# Patient Record
Sex: MALE | Race: WHITE | NOT HISPANIC OR LATINO | Employment: FULL TIME | URBAN - METROPOLITAN AREA
[De-identification: names, ages, dates, MRNs, and addresses within clinical notes are randomized per-mention and may not be internally consistent; named-entity substitution may affect disease eponyms.]

---

## 2020-08-17 ENCOUNTER — TELEPHONE (OUTPATIENT)
Dept: FAMILY MEDICINE | Facility: CLINIC | Age: 40
End: 2020-08-17

## 2020-08-17 NOTE — TELEPHONE ENCOUNTER
----- Message from Oscar Neff sent at 8/17/2020  3:51 PM CDT -----  Regarding: Labs  Contact: Patient  Type: Patient Call Back    Who called:Patient    What is the request in detail: Patient needs annual full panel labs. Please advise,    Can the clinic reply by [facility]? No    Would the patient rather a call back or a response via [facility]? Call    Best call back number: [phone] (home)     Additional Information:n/a

## 2020-08-18 ENCOUNTER — TELEPHONE (OUTPATIENT)
Dept: FAMILY MEDICINE | Facility: CLINIC | Age: 40
End: 2020-08-18

## 2020-08-18 DIAGNOSIS — Z00.00 ANNUAL PHYSICAL EXAM: Primary | ICD-10-CM

## 2020-08-18 NOTE — TELEPHONE ENCOUNTER
What medical conditions does patient have?  I would like to know so that I can order what I need to order  Also what day is patient wanting labs to be done?  Please schedule prior to office visit

## 2020-08-18 NOTE — TELEPHONE ENCOUNTER
Called patient and no answer. A message was left for them to call us back at the clinic regarding previous message from doctor.

## 2020-08-19 ENCOUNTER — LAB VISIT (OUTPATIENT)
Dept: LAB | Facility: HOSPITAL | Age: 40
End: 2020-08-19
Attending: FAMILY MEDICINE
Payer: COMMERCIAL

## 2020-08-19 DIAGNOSIS — Z00.00 ANNUAL PHYSICAL EXAM: ICD-10-CM

## 2020-08-19 LAB
ALBUMIN SERPL BCP-MCNC: 4.1 G/DL (ref 3.5–5.2)
ALP SERPL-CCNC: 75 U/L (ref 55–135)
ALT SERPL W/O P-5'-P-CCNC: 24 U/L (ref 10–44)
ANION GAP SERPL CALC-SCNC: 10 MMOL/L (ref 8–16)
AST SERPL-CCNC: 25 U/L (ref 10–40)
BASOPHILS # BLD AUTO: 0.02 K/UL (ref 0–0.2)
BASOPHILS NFR BLD: 0.4 % (ref 0–1.9)
BILIRUB SERPL-MCNC: 1.4 MG/DL (ref 0.1–1)
BUN SERPL-MCNC: 12 MG/DL (ref 6–20)
CALCIUM SERPL-MCNC: 9 MG/DL (ref 8.7–10.5)
CHLORIDE SERPL-SCNC: 106 MMOL/L (ref 95–110)
CHOLEST SERPL-MCNC: 165 MG/DL (ref 120–199)
CHOLEST/HDLC SERPL: 3.6 {RATIO} (ref 2–5)
CO2 SERPL-SCNC: 23 MMOL/L (ref 23–29)
CREAT SERPL-MCNC: 0.7 MG/DL (ref 0.5–1.4)
DIFFERENTIAL METHOD: ABNORMAL
EOSINOPHIL # BLD AUTO: 0.1 K/UL (ref 0–0.5)
EOSINOPHIL NFR BLD: 2.5 % (ref 0–8)
ERYTHROCYTE [DISTWIDTH] IN BLOOD BY AUTOMATED COUNT: 12.4 % (ref 11.5–14.5)
EST. GFR  (AFRICAN AMERICAN): >60 ML/MIN/1.73 M^2
EST. GFR  (NON AFRICAN AMERICAN): >60 ML/MIN/1.73 M^2
GLUCOSE SERPL-MCNC: 80 MG/DL (ref 70–110)
HCT VFR BLD AUTO: 46.5 % (ref 40–54)
HDLC SERPL-MCNC: 46 MG/DL (ref 40–75)
HDLC SERPL: 27.9 % (ref 20–50)
HGB BLD-MCNC: 15.6 G/DL (ref 14–18)
IMM GRANULOCYTES # BLD AUTO: 0.03 K/UL (ref 0–0.04)
IMM GRANULOCYTES NFR BLD AUTO: 0.6 % (ref 0–0.5)
LDLC SERPL CALC-MCNC: 100.4 MG/DL (ref 63–159)
LYMPHOCYTES # BLD AUTO: 1.7 K/UL (ref 1–4.8)
LYMPHOCYTES NFR BLD: 32.2 % (ref 18–48)
MCH RBC QN AUTO: 31 PG (ref 27–31)
MCHC RBC AUTO-ENTMCNC: 33.5 G/DL (ref 32–36)
MCV RBC AUTO: 92 FL (ref 82–98)
MONOCYTES # BLD AUTO: 0.3 K/UL (ref 0.3–1)
MONOCYTES NFR BLD: 6.1 % (ref 4–15)
NEUTROPHILS # BLD AUTO: 3.1 K/UL (ref 1.8–7.7)
NEUTROPHILS NFR BLD: 58.2 % (ref 38–73)
NONHDLC SERPL-MCNC: 119 MG/DL
NRBC BLD-RTO: 0 /100 WBC
PLATELET # BLD AUTO: 237 K/UL (ref 150–350)
PMV BLD AUTO: 11.2 FL (ref 9.2–12.9)
POTASSIUM SERPL-SCNC: 4 MMOL/L (ref 3.5–5.1)
PROT SERPL-MCNC: 7 G/DL (ref 6–8.4)
RBC # BLD AUTO: 5.03 M/UL (ref 4.6–6.2)
SODIUM SERPL-SCNC: 139 MMOL/L (ref 136–145)
TRIGL SERPL-MCNC: 93 MG/DL (ref 30–150)
TSH SERPL DL<=0.005 MIU/L-ACNC: 1.24 UIU/ML (ref 0.4–4)
WBC # BLD AUTO: 5.28 K/UL (ref 3.9–12.7)

## 2020-08-19 PROCEDURE — 85025 COMPLETE CBC W/AUTO DIFF WBC: CPT

## 2020-08-19 PROCEDURE — 80061 LIPID PANEL: CPT

## 2020-08-19 PROCEDURE — 36415 COLL VENOUS BLD VENIPUNCTURE: CPT | Mod: PO

## 2020-08-19 PROCEDURE — 84443 ASSAY THYROID STIM HORMONE: CPT

## 2020-08-19 PROCEDURE — 80053 COMPREHEN METABOLIC PANEL: CPT

## 2020-08-21 ENCOUNTER — OFFICE VISIT (OUTPATIENT)
Dept: FAMILY MEDICINE | Facility: CLINIC | Age: 40
End: 2020-08-21
Payer: COMMERCIAL

## 2020-08-21 VITALS
OXYGEN SATURATION: 97 % | HEIGHT: 67 IN | TEMPERATURE: 98 F | BODY MASS INDEX: 44.47 KG/M2 | WEIGHT: 283.31 LBS | SYSTOLIC BLOOD PRESSURE: 124 MMHG | DIASTOLIC BLOOD PRESSURE: 86 MMHG | HEART RATE: 83 BPM

## 2020-08-21 DIAGNOSIS — E66.01 MORBID OBESITY WITH BMI OF 40.0-44.9, ADULT: ICD-10-CM

## 2020-08-21 DIAGNOSIS — Z00.00 ANNUAL PHYSICAL EXAM: Primary | ICD-10-CM

## 2020-08-21 DIAGNOSIS — R06.02 SHORT OF BREATH ON EXERTION: ICD-10-CM

## 2020-08-21 PROCEDURE — 3008F PR BODY MASS INDEX (BMI) DOCUMENTED: ICD-10-PCS | Mod: CPTII,S$GLB,, | Performed by: FAMILY MEDICINE

## 2020-08-21 PROCEDURE — 99999 PR PBB SHADOW E&M-EST. PATIENT-LVL III: CPT | Mod: PBBFAC,,, | Performed by: FAMILY MEDICINE

## 2020-08-21 PROCEDURE — 99386 PR PREVENTIVE VISIT,NEW,40-64: ICD-10-PCS | Mod: S$GLB,,, | Performed by: FAMILY MEDICINE

## 2020-08-21 PROCEDURE — 3008F BODY MASS INDEX DOCD: CPT | Mod: CPTII,S$GLB,, | Performed by: FAMILY MEDICINE

## 2020-08-21 PROCEDURE — 99386 PREV VISIT NEW AGE 40-64: CPT | Mod: S$GLB,,, | Performed by: FAMILY MEDICINE

## 2020-08-21 PROCEDURE — 99999 PR PBB SHADOW E&M-EST. PATIENT-LVL III: ICD-10-PCS | Mod: PBBFAC,,, | Performed by: FAMILY MEDICINE

## 2020-08-21 RX ORDER — PREDNISONE 20 MG/1
20 TABLET ORAL 2 TIMES DAILY
COMMUNITY
Start: 2020-07-10 | End: 2020-08-21

## 2020-08-21 NOTE — PROGRESS NOTES
Routine Office Visit    Patient Name: Danilo Krause    : 1980  MRN: [id number]    Subjective:  Danilo is a 40 y.o. male who presents today for     1. Annual exam / establish care / new to me  2. covid infection - Patient and family had covid infection - Patient tested positive a few weeks ago - Patient states his symptoms have improved. He works outside and states that he has noticed that he continues to have shortness of breath on exertion. He also notices that he continues to be more fatigue and is not able to recover as fast as he was able to prior to having covid. He expresses concern because he does have family history of heart disease (his father has afib). He states symptoms are slowly improving on a daily basis.       Review of Systems   Constitutional: Negative for chills and fever.   HENT: Negative for congestion.    Eyes: Negative for blurred vision.   Respiratory: Positive for shortness of breath. Negative for cough.    Cardiovascular: Negative for chest pain.   Gastrointestinal: Negative for abdominal pain, constipation, diarrhea, heartburn, nausea and vomiting.   Genitourinary: Negative for dysuria.   Musculoskeletal: Negative for myalgias.   Skin: Negative for itching and rash.   Neurological: Negative for dizziness and headaches.   Psychiatric/Behavioral: Negative for depression.       Active Problem List  Patient Active Problem List   Diagnosis    Morbid obesity with BMI of 40.0-44.9, adult       Past Surgical History  History reviewed. No pertinent surgical history.    Family History  Family History   Problem Relation Age of Onset    Hypertension Father        Social History  Social History     Socioeconomic History    Marital status:      Spouse name: Not on file    Number of children: Not on file    Years of education: Not on file    Highest education level: Not on file   Occupational History    Not on file   Social Needs    Financial resource strain: Not on file    Food  "insecurity     Worry: Not on file     Inability: Not on file    Transportation needs     Medical: Not on file     Non-medical: Not on file   Tobacco Use    Smoking status: Never Smoker    Smokeless tobacco: Never Used   Substance and Sexual Activity    Alcohol use: Yes    Drug use: Never    Sexual activity: Yes     Partners: Female   Lifestyle    Physical activity     Days per week: Not on file     Minutes per session: Not on file    Stress: Only a little   Relationships    Social connections     Talks on phone: Not on file     Gets together: Not on file     Attends Zoroastrian service: Not on file     Active member of club or organization: Not on file     Attends meetings of clubs or organizations: Not on file     Relationship status: Not on file   Other Topics Concern    Not on file   Social History Narrative    Not on file       Medications and Allergies  Reviewed and updated.   No current outpatient medications on file.     No current facility-administered medications for this visit.        Physical Exam  /86 (BP Location: Left arm, Patient Position: Sitting, BP Method: Large (Manual))   Pulse 83   Temp 98 °F (36.7 °C) (Temporal)   Ht 5' 7" (1.702 m)   Wt 128.5 kg (283 lb 4.7 oz)   SpO2 97%   BMI 44.37 kg/m²   Physical Exam  Constitutional:       Appearance: He is well-developed.   HENT:      Head: Normocephalic and atraumatic.   Eyes:      Conjunctiva/sclera: Conjunctivae normal.      Pupils: Pupils are equal, round, and reactive to light.   Neck:      Musculoskeletal: Normal range of motion and neck supple.      Thyroid: No thyromegaly.      Vascular: No JVD.   Cardiovascular:      Rate and Rhythm: Normal rate and regular rhythm.      Heart sounds: Normal heart sounds.   Pulmonary:      Effort: Pulmonary effort is normal.      Breath sounds: Normal breath sounds. No wheezing.   Abdominal:      General: Bowel sounds are normal. There is no distension.      Palpations: Abdomen is soft.      " Tenderness: There is no abdominal tenderness. There is no guarding.   Musculoskeletal: Normal range of motion.   Lymphadenopathy:      Cervical: No cervical adenopathy.   Skin:     General: Skin is warm and dry.   Neurological:      Mental Status: He is alert and oriented to person, place, and time.   Psychiatric:         Behavior: Behavior normal.           Assessment/Plan:  Danilo Krause is a 40 y.o. male who presents today for :    Problem List Items Addressed This Visit        Endocrine    Morbid obesity with BMI of 40.0-44.9, adult  The patient is asked to make an attempt to improve diet and exercise patterns to aid in medical management of this problem.        Other Visit Diagnoses     Annual physical exam    -  Primary  Health Maintenance       Date Due Completion Date    Hepatitis C Screening 1980 ---    HIV Screening 01/10/1994 ---    TETANUS VACCINE 01/10/1997 ---    Influenza Vaccine (1) 09/01/2020 ---    Lipid Panel 08/19/2025 8/19/2020        I addressed all major concerns as it related to health maintenance.  All were ordered and scheduled based on the patients wishes.  Any additional health maintenance will be readdressed at the next physical if declined or deferred by the patient.  I reviewed patient's labs with patient       Short of breath on exertion        Relevant Orders    X-Ray Chest PA And Lateral (Completed)  Normal chest x-ray  Consider pulmonary function test  Patient may have residual shortness of breath with covid   Continue to monitor   cxr within normal limits   Physical exam within normal limit   Return if symptoms worsen / progress             Follow up in about 1 year (around 8/21/2021), or if symptoms worsen or fail to improve, for yearly exam.

## 2020-08-22 ENCOUNTER — HOSPITAL ENCOUNTER (OUTPATIENT)
Dept: RADIOLOGY | Facility: HOSPITAL | Age: 40
Discharge: HOME | End: 2020-08-22
Attending: FAMILY MEDICINE
Payer: COMMERCIAL

## 2020-08-22 DIAGNOSIS — R06.02 SHORT OF BREATH ON EXERTION: ICD-10-CM

## 2020-08-22 PROBLEM — E66.01 MORBID OBESITY WITH BMI OF 40.0-44.9, ADULT: Status: ACTIVE | Noted: 2020-08-22

## 2020-08-22 PROCEDURE — 71046 X-RAY EXAM CHEST 2 VIEWS: CPT | Mod: TC,FY,PO

## 2020-08-22 PROCEDURE — 71046 X-RAY EXAM CHEST 2 VIEWS: CPT | Mod: 26,,, | Performed by: RADIOLOGY

## 2020-08-22 PROCEDURE — 71046 XR CHEST PA AND LATERAL: ICD-10-PCS | Mod: 26,,, | Performed by: RADIOLOGY

## 2020-08-25 ENCOUNTER — TELEPHONE (OUTPATIENT)
Dept: FAMILY MEDICINE | Facility: CLINIC | Age: 40
End: 2020-08-25

## 2020-08-25 NOTE — TELEPHONE ENCOUNTER
----- Message from Kandace Diane sent at 8/25/2020  8:54 AM CDT -----  Regarding: pt  Type:  Test Results    Who Called: pt    Name of Test (Lab/Mammo/Etc): xray    Date of Test:     Ordering Provider: rom    Where the test was performed:     Would the patient rather a call back or a response via [facility]?     Best Call Back Number: [phone] (home)       Additional Information:      For Clinical Team:Has the provider reviewed the results?

## 2020-08-28 DIAGNOSIS — Z11.59 NEED FOR HEPATITIS C SCREENING TEST: ICD-10-CM

## 2021-12-08 DIAGNOSIS — Z11.59 NEED FOR HEPATITIS C SCREENING TEST: ICD-10-CM

## 2022-05-31 ENCOUNTER — PATIENT MESSAGE (OUTPATIENT)
Dept: ADMINISTRATIVE | Facility: HOSPITAL | Age: 42
End: 2022-05-31
Payer: COMMERCIAL

## 2023-06-01 ENCOUNTER — PATIENT OUTREACH (OUTPATIENT)
Dept: ADMINISTRATIVE | Facility: HOSPITAL | Age: 43
End: 2023-06-01
Payer: COMMERCIAL

## 2023-11-15 ENCOUNTER — TELEPHONE (OUTPATIENT)
Dept: FAMILY MEDICINE | Facility: CLINIC | Age: 43
End: 2023-11-15
Payer: COMMERCIAL

## 2023-11-15 NOTE — TELEPHONE ENCOUNTER
----- Message from Jamar Monte sent at 11/15/2023  9:31 AM CST -----  Type: Patient Call Back    Who called:Wife    What is the request in detail:In regards to pt getting medication to prevent flu due to wife testing positive    Can the clinic reply by [facility]?No    Would the patient rather a call back or a response via [facility]? Call    Best call back number:[phone]    Additional Information:

## 2023-11-15 NOTE — TELEPHONE ENCOUNTER
Patient has not been seen by DR. Gastelum in over 3 years. Patient needs to re-establish care with another provider. Message sent through [facility].

## 2024-07-01 ENCOUNTER — OFFICE VISIT (OUTPATIENT)
Dept: URGENT CARE | Facility: CLINIC | Age: 44
End: 2024-07-01
Payer: COMMERCIAL

## 2024-07-01 VITALS
BODY MASS INDEX: 44.67 KG/M2 | SYSTOLIC BLOOD PRESSURE: 132 MMHG | HEART RATE: 83 BPM | WEIGHT: 284.63 LBS | HEIGHT: 67 IN | DIASTOLIC BLOOD PRESSURE: 90 MMHG | TEMPERATURE: 98 F | RESPIRATION RATE: 20 BRPM | OXYGEN SATURATION: 95 %

## 2024-07-01 DIAGNOSIS — H69.91 DYSFUNCTION OF RIGHT EUSTACHIAN TUBE: ICD-10-CM

## 2024-07-01 DIAGNOSIS — H93.11 TINNITUS OF RIGHT EAR: Primary | ICD-10-CM

## 2024-07-01 PROCEDURE — 99203 OFFICE O/P NEW LOW 30 MIN: CPT | Mod: S$GLB,,,

## 2024-07-01 RX ORDER — CETIRIZINE HYDROCHLORIDE 10 MG/1
10 TABLET ORAL DAILY
Qty: 30 TABLET | Refills: 0 | Status: SHIPPED | OUTPATIENT
Start: 2024-07-01

## 2024-07-01 RX ORDER — FLUTICASONE PROPIONATE 50 MCG
2 SPRAY, SUSPENSION (ML) NASAL DAILY
Qty: 16 EACH | Refills: 0 | Status: SHIPPED | OUTPATIENT
Start: 2024-07-01

## 2024-07-01 NOTE — PROGRESS NOTES
"Subjective:     Patient ID: Danilo Krause is a 44 y.o. male.    Vitals:  height is 5' 7" (1.702 m) and weight is 129.1 kg (284 lb 9.8 oz). His oral temperature is 98.1 °F (36.7 °C). His blood pressure is 132/90 (abnormal) and his pulse is 83. His respiration is 20 and oxygen saturation is 95%.     Chief Complaint: Otalgia    Patient presents with right ear pressure and tinnitus. Associated symptoms include muffles hearing. He describes the tinnitus as a consistent high pitch ringing sensation.  Onset of the ringing was 3 days ago.  Patient states that ear pressure started 4 days ago.  He does admit to being sick with a upper respiratory infection about 2-3 weeks ago.  He denies fever, chills, ear drainage, headache, vision changes, dizziness, facial drooping, slurred speech, numbness, tingling.  Patient denies head injury or trauma.  Patient denies loud noise exposure.  Patient states that he has not started any new medications or supplements.    Ringing in Ears:   Chronicity:  New  Onset:  In the past 7 days  Progression since onset:  Unchanged  Frequency:  Constantly  Ringing in ear characteristics:  Muffled   Associated symptoms: Ear congestion and tinnitus.  No dizziness, no vertigo, no ear pain, no fever, no headaches, no buzzing, no rhinorrhea, no aural fullness, no fluctuance, no otalgia, no otorrhea, no facial weakness, no visual disturbances and not masked by noise.  Aggravated by:  Nothing  Treatments tried:  Nothing  Improvement on treatment:  No relief   PMH includes: Recent URI.  No stroke, TIA, or systemic emboli, no neurologic disease, no noise exposure, no dizziness, no ear tubes, no ototoxic drugs and no exposure.      Constitution: Negative for chills and fever.   HENT:  Positive for tinnitus. Negative for ear pain, ear discharge, foreign body in ear and hearing loss.    Neurological:  Negative for dizziness, history of vertigo, light-headedness, passing out, facial drooping, speech " difficulty, loss of balance, headaches, numbness and tingling.     Objective:     Physical Exam   Constitutional: He is oriented to person, place, and time.  Non-toxic appearance. He does not appear ill. No distress.      Comments:Patient is in no acute distress, patient is non-toxic appearing, patient is ox3, patient is answering question appropriately.     HENT:   Head: Normocephalic.   Ears:   Right Ear: Tympanic membrane, external ear and ear canal normal. No no drainage, swelling or tenderness. No foreign bodies. No mastoid tenderness. Tympanic membrane is not injected, not scarred, not perforated, not erythematous, not retracted and not bulging. No middle ear effusion. no impacted cerumen  Left Ear: Tympanic membrane, external ear and ear canal normal. No no drainage, swelling or tenderness. No foreign bodies. No mastoid tenderness. Tympanic membrane is not injected, not scarred, not perforated, not erythematous, not retracted and not bulging.  No middle ear effusion. no impacted cerumen  Nose: Congestion present. No rhinorrhea.   Mouth/Throat: Mucous membranes are moist. No oropharyngeal exudate or posterior oropharyngeal erythema. Oropharynx is clear.   Eyes: Pupils are equal, round, and reactive to light. Extraocular movement intact   Cardiovascular: Normal rate, regular rhythm and normal heart sounds.   No murmur heard.Exam reveals no gallop and no friction rub.   Pulmonary/Chest: Effort normal and breath sounds normal. No stridor. No respiratory distress. He has no wheezes. He has no rhonchi. He has no rales. He exhibits no tenderness.         Comments: Patient is in no respiratory distress. Breath sounds even, unlabored, and clear to auscultation bilaterally. No accessory muscle usage. Patient able to speak in complete sentences with ease.    Abdominal: Normal appearance.   Neurological: no focal deficit. He is alert, oriented to person, place, and time and at baseline. He has normal motor skills, normal  sensation and intact cranial nerves (2-12). He displays no weakness, no atrophy, no tremor, facial symmetry and no dysarthria. No cranial nerve deficit or sensory deficit. He exhibits normal muscle tone. He has a normal Tandem Gait Test. Coordination: Romberg sign negative. He shows no pronator drift. He displays no seizure activity. Gait and coordination normal. Gait normal. GCS eye subscore is 4. GCS verbal subscore is 5. GCS motor subscore is 6.   Reflex Scores:       Patellar reflexes are 2+ on the right side and 2+ on the left side.     Comments: Great  strength    CN2: vision intact  CN3: patient able to move eyes and blink  CN4: EOMI  CN5: sensation and movement of V1-V3 intact  CN6: EOMI  CN7: patient able to exhibit facial expressions  CN8: hearing and balance intact, negative romberg exam  CN9: gag reflex intact  CN10: Heart rate WNL  CN11: shoulder and neck movement/strength intact  CN12: tongue mobility appreciated   Skin: Skin is not diaphoretic.   Nursing note and vitals reviewed.    Assessment:     1. Tinnitus of right ear    2. Dysfunction of right eustachian tube      Plan:   Previous notes reviewed.  Vital signs reviewed.  Discussed Tinnitus and ETD, home care, tx options, and given follow up precautions.  Patient was briefed on my thought process and diagnosis.   Patient involved with the treatment plan and agreed to the plan. Patient is neurovascularly intact without neurological deficits.  Patient informed on warning signs, patient understood warning signs and to go to urgent care or ER if warning signs appear.  Please excuse grammatical/spelling errors appreciated throughout this visit encounter for a remote dictation device was used during this encounter.    Patient Instructions   Please go to the Emergency Department for any concerns or worsening of condition.    Please take CETIRIZINE for allergy relief.  Please take FLONASE for nasal/sinus congestion.    You were given a referral to  ENT, please call [phone] for scheduling and appointments.     Please follow up with your primary care doctor or specialist as needed.    If you  smoke, please stop smoking.    Tinnitus of right ear  -     Ambulatory referral/consult to ENT    Dysfunction of right eustachian tube  -     fluticasone propionate (FLONASE) 50 mcg/actuation nasal spray; 2 sprays (100 mcg total) by Each Nostril route once daily.  Dispense: 16 each; Refill: 0  -     cetirizine (ZYRTEC) 10 MG tablet; Take 1 tablet (10 mg total) by mouth once daily.  Dispense: 30 tablet; Refill: 0      Yoan Dean PA-C

## 2024-07-01 NOTE — PATIENT INSTRUCTIONS
Please go to the Emergency Department for any concerns or worsening of condition.    Please take CETIRIZINE for allergy relief.  Please take FLONASE for nasal/sinus congestion.    You were given a referral to ENT, please call [phone] for scheduling and appointments.     Please follow up with your primary care doctor or specialist as needed.    If you  smoke, please stop smoking.

## 2024-08-13 ENCOUNTER — TELEPHONE (OUTPATIENT)
Dept: OTOLARYNGOLOGY | Facility: CLINIC | Age: 44
End: 2024-08-13
Payer: COMMERCIAL

## 2024-08-13 NOTE — TELEPHONE ENCOUNTER
----- Message from Christiano Marie sent at 8/13/2024  9:20 AM CDT -----  Contact: Pt  .Type:  Needs Medical Advice    Who Called: pt. Wife Shandra  Would the patient rather a call back or a response via [facility]?  Call back  Best Call Back Number: [phone]  Additional Information:  Pt. Wife is calling regarding her  had an audio test done 2 weeks ago [facility]/2024 [facility] phone # [phone].  The pt. Wife states the ENT clinic was suppose to have sent over the results.  Pt. Appt is tomorrow 08/14/2024 @1:30 p.m..

## 2024-08-13 NOTE — TELEPHONE ENCOUNTER
Spoke to pt wife, informed we havent gotten an audio on pt, states she is going to pick it up today and bring to appt tomorrow.

## 2024-08-13 NOTE — TELEPHONE ENCOUNTER
Lvm for pt to return call, needs audio for ringing in ears for appt tomorrow unless already had one recently

## 2024-08-14 ENCOUNTER — OFFICE VISIT (OUTPATIENT)
Dept: OTOLARYNGOLOGY | Facility: CLINIC | Age: 44
End: 2024-08-14
Payer: COMMERCIAL

## 2024-08-14 ENCOUNTER — DOCUMENTATION ONLY (OUTPATIENT)
Dept: OTOLARYNGOLOGY | Facility: CLINIC | Age: 44
End: 2024-08-14

## 2024-08-14 ENCOUNTER — LAB VISIT (OUTPATIENT)
Dept: LAB | Facility: HOSPITAL | Age: 44
End: 2024-08-14
Attending: NURSE PRACTITIONER
Payer: COMMERCIAL

## 2024-08-14 VITALS
SYSTOLIC BLOOD PRESSURE: 128 MMHG | DIASTOLIC BLOOD PRESSURE: 82 MMHG | WEIGHT: 284.63 LBS | BODY MASS INDEX: 44.67 KG/M2 | HEIGHT: 67 IN

## 2024-08-14 DIAGNOSIS — H90.A21 SENSORINEURAL HEARING LOSS (SNHL) OF RIGHT EAR WITH RESTRICTED HEARING OF LEFT EAR: ICD-10-CM

## 2024-08-14 DIAGNOSIS — H90.3 ASYMMETRICAL SENSORINEURAL HEARING LOSS: Primary | ICD-10-CM

## 2024-08-14 DIAGNOSIS — H93.11 TINNITUS OF RIGHT EAR: ICD-10-CM

## 2024-08-14 DIAGNOSIS — H90.3 ASYMMETRICAL SENSORINEURAL HEARING LOSS: ICD-10-CM

## 2024-08-14 LAB
CREAT SERPL-MCNC: 0.9 MG/DL (ref 0.5–1.4)
EST. GFR  (NO RACE VARIABLE): >60 ML/MIN/1.73 M^2

## 2024-08-14 PROCEDURE — 3008F BODY MASS INDEX DOCD: CPT | Mod: CPTII,S$GLB,, | Performed by: NURSE PRACTITIONER

## 2024-08-14 PROCEDURE — 99204 OFFICE O/P NEW MOD 45 MIN: CPT | Mod: S$GLB,,, | Performed by: NURSE PRACTITIONER

## 2024-08-14 PROCEDURE — 82565 ASSAY OF CREATININE: CPT | Performed by: NURSE PRACTITIONER

## 2024-08-14 PROCEDURE — 36415 COLL VENOUS BLD VENIPUNCTURE: CPT | Performed by: NURSE PRACTITIONER

## 2024-08-14 PROCEDURE — 3079F DIAST BP 80-89 MM HG: CPT | Mod: CPTII,S$GLB,, | Performed by: NURSE PRACTITIONER

## 2024-08-14 PROCEDURE — 1159F MED LIST DOCD IN RCRD: CPT | Mod: CPTII,S$GLB,, | Performed by: NURSE PRACTITIONER

## 2024-08-14 PROCEDURE — 3074F SYST BP LT 130 MM HG: CPT | Mod: CPTII,S$GLB,, | Performed by: NURSE PRACTITIONER

## 2024-08-14 NOTE — PROGRESS NOTES
OTOLARYNGOLOGY CLINIC NOTE  Date:  08/14/2024     Chief complaint:  Chief Complaint   Patient presents with    Tinnitus     Right ear x 2 months     History of Present Illness  Danilo Krause is a 44 y.o. male  presenting today for a new evaluation and treatment of right ear tinnitus and sudden hearing loss.  Pt reports his symptoms started 6/28/2024 with right ear fullness after a baseball game.  Pt reports he was treated at urgent care with Flonase and zyrtec.  Pt reports as the week processed he started having ringing in the ear and the fullness felt worse.  Pt reports this time he went to urgent ENT where he was given steroids but did not have a hearing test on this day.  Pt reports going back 2 weeks later and was given a hearing test and referral for MRI, Neurosurgery and Dr. Ba.    Pt reports do to insurance issues with the place he was referred to not taking his insurance he has not had the MRI done.  Pt reports when he returned to urgent ENT he was not given any additional steroid or injection in his ear.  Pt reports he has not had any improvement in his hearing.  Pt reports now he can understand when one person is talking to him but otherwise he can't understand what is being said around him on the right side.      Review of medical records and prior documentation  Past medical records were reviewed with data pertinent to the chief complaint summarized in the HPI. Information obtained from review of medical records is attributed to respective sources in the HPI with reference to sources of information at their mention. Records reviewed included all recent notes from referring provider, primary care, and related subspecialty evaluations as available. This review of records was performed and additional data obtained to supplement history obtained from the patient and further inform medical decision making involved in formulating a plan of care accounting for all history and treatment relevant to the  "issues addressed.    Past Medical History  No past medical history on file.     Past Surgical History  No past surgical history on file.     Medications  Current Outpatient Medications on File Prior to Visit   Medication Sig Dispense Refill    cetirizine (ZYRTEC) 10 MG tablet Take 1 tablet (10 mg total) by mouth once daily. (Patient not taking: Reported on 8/14/2024) 30 tablet 0    fluticasone propionate (FLONASE) 50 mcg/actuation nasal spray 2 sprays (100 mcg total) by Each Nostril route once daily. (Patient not taking: Reported on 8/14/2024) 16 each 0     No current facility-administered medications on file prior to visit.     Review of Systems  Review of Systems   Constitutional: Negative.    HENT:  Positive for hearing loss and tinnitus.    Eyes: Negative.    Respiratory: Negative.     Cardiovascular: Negative.    Gastrointestinal: Negative.    Skin: Negative.       Social History   reports that he has never smoked. He has never used smokeless tobacco. He reports current alcohol use. He reports that he does not use drugs.     Family History  Family History   Problem Relation Name Age of Onset    Hypertension Father        Physical Exam   Vitals:    08/14/24 1345   BP: 128/82    Body mass index is 44.58 kg/m².  Weight: 129.1 kg (284 lb 9.8 oz)   Height: 5' 7" (170.2 cm)     GENERAL: no acute distress.  HEAD: normocephalic.   EYES: lids and lashes normal. No scleral icterus  EARS: external ear without lesion, normal pinna shape and position.  External auditory canal with normal cerumen, tympanic membrane fully visible, no perforation , no retraction. No middle ear effusion. Ossicles intact.   NOSE: external nose without significant bony abnormality  ORAL CAVITY/OROPHARYNX: tongue midline and mobile. Symmetric palate rise. Uvula midline.   NECK: trachea midline.   LYMPH NODES:No cervical lymphadenopathy.  RESPIRATORY: no stridor, no stertor. Voice normal. Respirations nonlabored.  NEURO: alert, responds to " questions appropriately.   Cranial nerve exam as indicated in above sections and additionally showed facial movement symmetric with good eye closure and symmetric smile.   PSYCH:mood appropriate    Imaging:  The patient does not have any pertinent and/or recent imaging of the head and neck.     AUDIOLOGY RESULTS from urgent ENT on 7/24/2024:    Audiometric evaluation including audiogram, tympanometry, acoustic reflexes, and speech discrimination which was performed  was personally reviewed and interpreted.  Notable findings on the audiogram were mild rising to normal then sloping to mild high frequency sensorineural hearing loss (SNHL) for the left ear and moderate rising to mild SNHL for the right ear.  Speech discrimination was 100% on the left, and 52% on the right.  Report of the audiologist performing this audiometric testing was also reviewed.    Assessment  1. Asymmetrical sensorineural hearing loss  - MRI IAC/Temporal Bones W W/O Contrast; Future    2. Sensorineural hearing loss (SNHL) of right ear with restricted hearing of left ear  - MRI IAC/Temporal Bones W W/O Contrast; Future    3. Tinnitus of right ear     Plan:  ASNHL:  Pt advised will obtain MRI to check for lesion on hearing nerve.  Audiogram reviewed and discussed with patient. Recheck hearing at f/u visit.  Pt was referred to Dr. Ba but will see Dr. James given he is on [facility] for f/u care.  Encouraged hearing protection while in noisy environments.    Tinnitus:  Pt advised there is no cure for condition but methods to help control. Pt advised to avoid caffeine, alcohol, tobacco and stress.  Pt advised hearing aids sometimes help with tinnitus.  Pt can also try using white noise machine in quiet environment. Pt advised to wear hearing protection in noisy environment.  F/u prn  Discussed plan of care with patient in detail and all questions answered. Patient reported understanding of plan of care.

## 2024-08-19 ENCOUNTER — TELEPHONE (OUTPATIENT)
Dept: OTOLARYNGOLOGY | Facility: CLINIC | Age: 44
End: 2024-08-19
Payer: COMMERCIAL

## 2024-08-19 NOTE — TELEPHONE ENCOUNTER
Lvm for pt, spoke to pt wife, informed MRI already ordered at [facility] [place] [place] approved so insurance denied our order, needs to call insurance to see if they can get it cancelled or ask [facility] [place] [place] to schedule MRI at a in network place.  Mrs. Devi is going to call pt insurance and see what she can get done and call me back.

## 2024-08-20 DIAGNOSIS — H90.5 SENSORINEURAL HEARING LOSS (SNHL) OF RIGHT EAR, UNSPECIFIED HEARING STATUS ON CONTRALATERAL SIDE: ICD-10-CM

## 2024-08-20 DIAGNOSIS — H90.3 ASYMMETRICAL SENSORINEURAL HEARING LOSS: Primary | ICD-10-CM

## 2024-08-26 ENCOUNTER — TELEPHONE (OUTPATIENT)
Dept: OTOLARYNGOLOGY | Facility: CLINIC | Age: 44
End: 2024-08-26
Payer: COMMERCIAL

## 2024-08-26 DIAGNOSIS — H93.11 TINNITUS OF RIGHT EAR: ICD-10-CM

## 2024-08-26 DIAGNOSIS — H90.5 SENSORINEURAL HEARING LOSS (SNHL) OF RIGHT EAR, UNSPECIFIED HEARING STATUS ON CONTRALATERAL SIDE: Primary | ICD-10-CM

## 2024-08-26 DIAGNOSIS — H90.3 ASYMMETRICAL SENSORINEURAL HEARING LOSS: ICD-10-CM

## 2024-08-26 NOTE — TELEPHONE ENCOUNTER
Spoke to pt wife, states [facility] [place] retacted authorization for MRI they ordered, insurance ok to do Auth for MRI Mrs. Ruiz ordered .  Informed wife will put in new order and let the auth dept know. Verb understanding

## 2024-08-26 NOTE — TELEPHONE ENCOUNTER
----- Message from Drea Worthy sent at 8/26/2024 11:25 AM CDT -----  Contact: Pt Wife    ----- Message -----  From: Drea Worthy  Sent: 8/26/2024  10:41 AM CDT  To: Master Agustín Staff    Type:  Patient Returning Call    Who Called: Pt Wife Shandra   Who Patient is requesting to speak to:  Greta RUEDA   Does the patient know what this is regarding?:  MRI Order that was denied  Would the patient rather a call back or a response via [facility]?  Call  Best Call Back Number: [phone]  Please call to advise... Thank you...

## 2024-08-30 ENCOUNTER — TELEPHONE (OUTPATIENT)
Dept: OTOLARYNGOLOGY | Facility: CLINIC | Age: 44
End: 2024-08-30
Payer: COMMERCIAL

## 2024-08-30 NOTE — TELEPHONE ENCOUNTER
Patient wife calling stating that she called [employer], they are telling her that MRI was denied. That our office needs to call neymar to get it authorized. I explained to her that we have an authorization team that handle it for us. Checked in chart to see if it was worked on yet, reached out to [facility]- to verify. She will submit for the auth today. Explained to patient wife to still show up for appt Tuesday 9/3 unless someone from [facility] calls her and tells her other gaines

## 2024-08-30 NOTE — TELEPHONE ENCOUNTER
----- Message from Christiano Marie sent at 8/30/2024 10:18 AM CDT -----  Contact: Jillian Devi  .Type:  Needs Medical Advice    Who Called: pt. Wife Shandra  Would the patient rather a call back or a response via [facility]?  Call back  Best Call Back Number: [phone]  Additional Information: Pt. wife is requesting a call back from Greta regarding the MRI order has to be called to get the changes down.

## 2024-09-04 ENCOUNTER — HOSPITAL ENCOUNTER (OUTPATIENT)
Dept: RADIOLOGY | Facility: HOSPITAL | Age: 44
Discharge: HOME | End: 2024-09-04
Attending: NURSE PRACTITIONER
Payer: COMMERCIAL

## 2024-09-04 DIAGNOSIS — H90.3 ASYMMETRICAL SENSORINEURAL HEARING LOSS: ICD-10-CM

## 2024-09-04 DIAGNOSIS — H90.A21 SENSORINEURAL HEARING LOSS (SNHL) OF RIGHT EAR WITH RESTRICTED HEARING OF LEFT EAR: ICD-10-CM

## 2024-09-04 PROCEDURE — A9585 GADOBUTROL INJECTION: HCPCS | Performed by: NURSE PRACTITIONER

## 2024-09-04 PROCEDURE — 70553 MRI BRAIN STEM W/O & W/DYE: CPT | Mod: 26,,, | Performed by: RADIOLOGY

## 2024-09-04 PROCEDURE — 25500020 PHARM REV CODE 255: Performed by: NURSE PRACTITIONER

## 2024-09-04 PROCEDURE — 70553 MRI BRAIN STEM W/O & W/DYE: CPT | Mod: TC

## 2024-09-04 RX ORDER — GADOBUTROL 604.72 MG/ML
10 INJECTION INTRAVENOUS
Status: COMPLETED | OUTPATIENT
Start: 2024-09-04 | End: 2024-09-04

## 2024-09-04 RX ADMIN — GADOBUTROL 10 ML: 604.72 INJECTION INTRAVENOUS at 01:09

## 2024-09-11 ENCOUNTER — PATIENT MESSAGE (OUTPATIENT)
Dept: AUDIOLOGY | Facility: CLINIC | Age: 44
End: 2024-09-11
Payer: COMMERCIAL

## 2024-09-12 ENCOUNTER — OFFICE VISIT (OUTPATIENT)
Dept: OTOLARYNGOLOGY | Facility: CLINIC | Age: 44
End: 2024-09-12
Payer: COMMERCIAL

## 2024-09-12 ENCOUNTER — CLINICAL SUPPORT (OUTPATIENT)
Dept: AUDIOLOGY | Facility: CLINIC | Age: 44
End: 2024-09-12
Payer: COMMERCIAL

## 2024-09-12 VITALS — WEIGHT: 284.63 LBS | HEIGHT: 67 IN | BODY MASS INDEX: 44.67 KG/M2

## 2024-09-12 DIAGNOSIS — H90.41 SENSORINEURAL HEARING LOSS (SNHL) OF RIGHT EAR WITH UNRESTRICTED HEARING OF LEFT EAR: Primary | ICD-10-CM

## 2024-09-12 DIAGNOSIS — H91.21 SUDDEN HEARING LOSS, RIGHT: ICD-10-CM

## 2024-09-12 PROCEDURE — 99214 OFFICE O/P EST MOD 30 MIN: CPT | Mod: S$GLB,,, | Performed by: OTOLARYNGOLOGY

## 2024-09-12 PROCEDURE — 1160F RVW MEDS BY RX/DR IN RCRD: CPT | Mod: CPTII,S$GLB,, | Performed by: OTOLARYNGOLOGY

## 2024-09-12 PROCEDURE — 3008F BODY MASS INDEX DOCD: CPT | Mod: CPTII,S$GLB,, | Performed by: OTOLARYNGOLOGY

## 2024-09-12 PROCEDURE — 92550 TYMPANOMETRY & REFLEX THRESH: CPT | Mod: S$GLB,,, | Performed by: AUDIOLOGIST

## 2024-09-12 PROCEDURE — 92557 COMPREHENSIVE HEARING TEST: CPT | Mod: S$GLB,,, | Performed by: AUDIOLOGIST

## 2024-09-12 PROCEDURE — 1159F MED LIST DOCD IN RCRD: CPT | Mod: CPTII,S$GLB,, | Performed by: OTOLARYNGOLOGY

## 2024-09-12 NOTE — PROGRESS NOTES
Mr. Danilo Krause was seen in the clinic today for an audiological evaluation.  Danilo Krause reported hearing loss of the right ear and tinnitus of the right ear.    Audiological testing revealed a moderate rising to mild sensorineural hearing loss for the right ear and normal hearing sloping to a mild high frequency sensorineural hearing loss for the left ear.  A speech reception threshold was obtained at 50 dBHL for the right ear and at 10 dBHL for the left ear.  Speech discrimination was 20% for the right ear and 100% for the left ear.      Tympanometry testing revealed a Type A tympanogram for the right ear and a Type A tympanogram for the left ear.  Ipsilateral acoustic reflexes were present from 500-4000 Hz for the right ear and were present from 500-4000 Hz for the left ear.     Recommendations:  1. Otologic evaluation  2. Annual audiological evaluation  3. Hearing protection when in noise   4. Hearing aid consultation for the right ear following medical clearance

## 2024-09-12 NOTE — PROGRESS NOTES
Subjective     Patient ID: Danilo Krause is a 44 y.o. male.    Chief Complaint: Hearing Loss (R ear , had audio and MRI)    HPI     Danilo Krause is a 44 y.o. male hx of ISSNHL AD 6/28/24.  No change since onset.  No associated URI. No prior ear hx.  Runs a lawn service.  Was treated with oral prednisone.       Review of Systems   Constitutional:  Negative for chills and fever.   HENT:  Positive for hearing loss and tinnitus. Negative for sore throat and trouble swallowing.    Respiratory:  Negative for apnea and chest tightness.    Cardiovascular:  Negative for chest pain.          Objective     Physical Exam  Vitals and nursing note reviewed.   Constitutional:       Appearance: Normal appearance.   HENT:      Head: Normocephalic and atraumatic.      Right Ear: Tympanic membrane, ear canal and external ear normal. There is no impacted cerumen.      Left Ear: Tympanic membrane, ear canal and external ear normal. There is no impacted cerumen.   Neurological:      Mental Status: He is alert.           Data Reviewed:          Audiogram 9/12/24: shows moderate SNHL with SD 20% of right ear       MRI IAC  9/4/24 images  independently reviewed by me.  No evidence of IAC or inner ear anomaly, no evidence of acoustic neuroma.          Assessment and Plan     1. Sensorineural hearing loss (SNHL) of right ear with unrestricted hearing of left ear    2. Sudden hearing loss, right        Reviewed options including CROS, BAHA, and CI.  Do not feel strongly about any at this point given only moderate PTA.    Recommend repeat audio in 4mo  Discussed hearing protection  Discussed assoc w/ CV risk factors          No follow-ups on file.

## 2025-01-13 ENCOUNTER — CLINICAL SUPPORT (OUTPATIENT)
Dept: AUDIOLOGY | Facility: CLINIC | Age: 45
End: 2025-01-13
Payer: COMMERCIAL

## 2025-01-13 DIAGNOSIS — H90.3 SENSORINEURAL HEARING LOSS (SNHL) OF BOTH EARS: ICD-10-CM

## 2025-01-13 DIAGNOSIS — H90.A21 SENSORINEURAL HEARING LOSS (SNHL) OF RIGHT EAR WITH RESTRICTED HEARING OF LEFT EAR: Primary | ICD-10-CM

## 2025-01-13 PROCEDURE — 92557 COMPREHENSIVE HEARING TEST: CPT | Mod: S$GLB,,,

## 2025-01-13 PROCEDURE — 92567 TYMPANOMETRY: CPT | Mod: S$GLB,,,

## 2025-01-14 NOTE — PROGRESS NOTES
Please click on link to view Audiogram:  Document on 1/13/2025 8:46 AM by Janice Patino AU.D: Audiogram    Mr. Danilo Krause, a 45 y.o. male, was seen in the clinic today for a follow-up audiological evaluation for sudden sensorineural hearing loss (SNHL) of his right ear.    Otoscopy clear bilaterally. Tympanometry testing revealed a Type A tympanogram for the right ear and a Type A tympanogram for the left ear.     Audiological testing revealed an essentially moderate rising to mild SNHL for the right ear and a mild high frequency SNHL for the left ear. A speech reception threshold was obtained at 50 dBHL for the right ear and at 20 dBHL for the left ear. Speech discrimination was 32% for the right ear and 100% for the left ear.      Today's results were discussed with the patient. Patient expressed understanding of hearing test results and recommendations.    Recommendations:  1. Annual audiological evaluation, sooner if medically necessary or if hearing changes  2. Hearing protection when in noise   3. Utilize ReSound Relief cruz and other tinnitus management strategies discussed during appointment (lower salt/caffeine intake, monitor stress/anxiety levels, soft background noise in quiet)  4. Hearing aid consultation following medical clearance for [place]

## 2025-02-11 ENCOUNTER — OFFICE VISIT (OUTPATIENT)
Dept: FAMILY MEDICINE | Facility: CLINIC | Age: 45
End: 2025-02-11
Payer: COMMERCIAL

## 2025-02-11 VITALS — HEIGHT: 67 IN | OXYGEN SATURATION: 98 % | WEIGHT: 288.56 LBS | HEART RATE: 63 BPM | BODY MASS INDEX: 45.29 KG/M2

## 2025-02-11 DIAGNOSIS — E66.01 MORBID OBESITY WITH BMI OF 40.0-44.9, ADULT: ICD-10-CM

## 2025-02-11 DIAGNOSIS — Z00.00 ANNUAL PHYSICAL EXAM: Primary | ICD-10-CM

## 2025-02-11 DIAGNOSIS — M79.672 PAIN IN BOTH FEET: ICD-10-CM

## 2025-02-11 DIAGNOSIS — Z12.12 ENCOUNTER FOR COLORECTAL CANCER SCREENING: ICD-10-CM

## 2025-02-11 DIAGNOSIS — M79.671 PAIN IN BOTH FEET: ICD-10-CM

## 2025-02-11 DIAGNOSIS — Z12.11 ENCOUNTER FOR COLORECTAL CANCER SCREENING: ICD-10-CM

## 2025-02-11 DIAGNOSIS — Z23 NEED FOR DIPHTHERIA-TETANUS-PERTUSSIS (TDAP) VACCINE: ICD-10-CM

## 2025-02-11 DIAGNOSIS — H90.41 SENSORINEURAL HEARING LOSS (SNHL) OF RIGHT EAR WITH UNRESTRICTED HEARING OF LEFT EAR: ICD-10-CM

## 2025-02-11 PROCEDURE — 90471 IMMUNIZATION ADMIN: CPT | Mod: S$GLB,,, | Performed by: FAMILY MEDICINE

## 2025-02-11 PROCEDURE — 3008F BODY MASS INDEX DOCD: CPT | Mod: CPTII,S$GLB,, | Performed by: FAMILY MEDICINE

## 2025-02-11 PROCEDURE — 1160F RVW MEDS BY RX/DR IN RCRD: CPT | Mod: CPTII,S$GLB,, | Performed by: FAMILY MEDICINE

## 2025-02-11 PROCEDURE — 99999 PR PBB SHADOW E&M-EST. PATIENT-LVL III: CPT | Mod: PBBFAC,,, | Performed by: FAMILY MEDICINE

## 2025-02-11 PROCEDURE — 1159F MED LIST DOCD IN RCRD: CPT | Mod: CPTII,S$GLB,, | Performed by: FAMILY MEDICINE

## 2025-02-11 PROCEDURE — 90715 TDAP VACCINE 7 YRS/> IM: CPT | Mod: S$GLB,,, | Performed by: FAMILY MEDICINE

## 2025-02-11 PROCEDURE — 99386 PREV VISIT NEW AGE 40-64: CPT | Mod: 25,S$GLB,, | Performed by: FAMILY MEDICINE

## 2025-02-11 NOTE — PROGRESS NOTES
"Routine Office Visit     Patient Name: Danilo Krause    : 1980  MRN: [id number]    Subjective     History of Present Illness    CHIEF COMPLAINT:  Patient presents today for annual follow up    HEARING LOSS:  He reports sudden onset of sensorineural hearing loss in the right ear, initially experiencing a sensation of ear feeling "filled up with air" during a conversation at a softball tournament. He experiences persistent tinnitus and distorted sound in the right ear, particularly in crowded or noisy environments. Initial hearing tests showed approximately 50% accuracy in the right ear with left ear at 100%. Recent testing shows a 10% improvement in the right ear. He denies current use of hearing aids, stating he can manage without them. 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 He reports using ear protection while working.    MUSCULOSKELETAL - PLANTAR FASCIITIS:  He reports bilateral heel pain throughout the day, worse after periods of rest. He experiences difficulty walking initially after sitting or driving until he "warms up." The pain is located at the bottom of both feet, persisting for several hours while working, but is not tender to touch. Discomfort is less noticeable while wearing shoes but more pronounced when barefoot. He manages symptoms with insoles in all shoes, including custom insoles in work boots, and avoiding walking barefoot. He occasionally takes Tylenol for pain relief.    FAMILY HISTORY:  Father has atrial fibrillation and hypertension. Both paternal and maternal grandparents had hypertension.      ROS:  General: no fever, no chills, no fatigue, no weight gain, no weight loss  Eyes: no vision changes, no redness, no discharge  ENT: no ear pain, no nasal congestion, no sore throat, +tinnitus  Cardiovascular: no chest pain, no palpitations, no lower extremity " "edema  Respiratory: no cough, no shortness of breath  Gastrointestinal: no abdominal pain, no nausea, no vomiting, no diarrhea, no constipation, no blood in stool  Genitourinary: no dysuria, no hematuria, no frequency  Musculoskeletal: no joint pain, no muscle pain  Skin: no rash, no lesion  Neurological: no headache, no dizziness, no numbness, no tingling  Psychiatric: no anxiety, no depression, no sleep difficulty           Objective     Pulse 63   Ht 5' 7" (1.702 m)   Wt 130.9 kg (288 lb 9.3 oz)   SpO2 98%   BMI 45.20 kg/m²   Physical Exam  Constitutional:       Appearance: He is well-developed.   HENT:      Head: Normocephalic and atraumatic.   Eyes:      Conjunctiva/sclera: Conjunctivae normal.      Pupils: Pupils are equal, round, and reactive to light.   Neck:      Thyroid: No thyromegaly.      Vascular: No JVD.   Cardiovascular:      Rate and Rhythm: Normal rate and regular rhythm.      Heart sounds: Normal heart sounds.   Pulmonary:      Effort: Pulmonary effort is normal.      Breath sounds: Normal breath sounds. No wheezing.   Abdominal:      General: Bowel sounds are normal. There is no distension.      Palpations: Abdomen is soft.      Tenderness: There is no abdominal tenderness. There is no guarding.   Musculoskeletal:         General: Normal range of motion.      Cervical back: Normal range of motion and neck supple.   Lymphadenopathy:      Cervical: No cervical adenopathy.   Skin:     General: Skin is warm and dry.   Neurological:      Mental Status: He is alert and oriented to person, place, and time.   Psychiatric:         Behavior: Behavior normal.           Assessment     Assessment & Plan        TETANUS VACCINATION:   Reviewed immunization status, focusing on tetanus, flu, and COVID vaccines.   Assessed need for tetanus vaccination due to outdoor work and time since last vaccination.      LABS:   Added one-time screening for HIV and Hepatitis C.   Ordered Cologuard test for colon cancer " screening, as patient is due for annual labs and cancer screening at age 45.    FOLLOW UP:   Scheduled follow-up on Friday, July 21, 2025 at 9:45 AM for fasting labs (patient advised can come as early as 7 AM).   Patient to review lab results via [employer]; office will contact if results are abnormal.         Problem List Items Addressed This Visit          ENT    Sensorineural hearing loss (SNHL) of right ear with unrestricted hearing of left ear   Monitored patient's right ear sensory neural hearing loss, with reported ringing and distorted sound in crowded or noisy environments.   Recent tests show 10% improvement in accuracy from previous 50% on the right side.   Discussed hearing aids as a potential treatment option, though not yet explored by the patient.   Noted that initial steroid treatment was not administered within the recommended timeframe.   Monitored persistent bilateral tinnitus, present since the onset of hearing loss.   Patient experiences distorted sound and difficulty understanding speech in noisy environments.         Endocrine    Morbid obesity with BMI of 40.0-44.9, adult   Ordered comprehensive annual labs, including tests for thyroid, cholesterol, liver, kidney, anemia, and diabetes.   Recommend lifestyle modifications, emphasizing diet changes and increased exercise, particularly important due to family history of high blood pressure and atrial fibrillation.   Stressed the importance of weight loss as a crucial component of overall health management.       Other Visit Diagnoses       Annual physical exam    -  Primary    Relevant Orders    CBC Auto Differential    Comprehensive Metabolic Panel    Lipid Panel    Hemoglobin A1C    TSH    HIV 1/2 Ag/Ab (4th Gen)    HEPATITIS C ANTIBODY  CARDIOVASCULAR HEALTH:   Noted normal blood pressure despite family history of hypertension.   Observed elevated heart rate, to be rechecked at follow-up.   Recommend purchasing a home blood pressure monitor and  instructed patient to check periodically, especially when feeling unwell or experiencing headaches.   Emphasized importance of monitoring and prevention due to strong family history of cardiovascular issues.      Encounter for colorectal cancer screening        Relevant Orders    Cologuard Screening (Multitarget Stool DNA)    Pain in both feet        Relevant Orders    Ambulatory referral/consult to Podiatry  PLANTAR FASCIITIS:   Assessed foot pain as likely due to plantar fasciitis based on reported symptoms and occupation.   Physical exam revealed pain at the bottom of the foot and when applying pressure to the heel.   Recommend management strategies: wearing supportive footwear with insoles, avoiding walking barefoot, performing stretches, and continuing Tylenol as needed for pain.   Referred patient to podiatry for further evaluation and management.      Need for diphtheria-tetanus-pertussis (Tdap) vaccine        Relevant Medications    Tdap (BOOSTRIX) vaccine injection 0.5 mL (Completed) (Start on 2/11/2025  2:32 PM)              This note was generated with the assistance of ambient listening technology. Verbal consent was obtained by the patient and accompanying visitor(s) for the recording of patient appointment to facilitate this note. I attest to having reviewed and edited the generated note for accuracy, though some syntax or spelling errors may persist. Please contact the author of this note for any clarification.

## 2025-02-21 ENCOUNTER — OFFICE VISIT (OUTPATIENT)
Dept: PODIATRY | Facility: CLINIC | Age: 45
End: 2025-02-21
Payer: COMMERCIAL

## 2025-02-21 ENCOUNTER — LAB VISIT (OUTPATIENT)
Dept: LAB | Facility: HOSPITAL | Age: 45
End: 2025-02-21
Attending: FAMILY MEDICINE
Payer: COMMERCIAL

## 2025-02-21 VITALS — WEIGHT: 288.56 LBS | HEIGHT: 67 IN | BODY MASS INDEX: 45.29 KG/M2

## 2025-02-21 DIAGNOSIS — M79.671 PAIN IN BOTH FEET: ICD-10-CM

## 2025-02-21 DIAGNOSIS — Z00.00 ANNUAL PHYSICAL EXAM: ICD-10-CM

## 2025-02-21 DIAGNOSIS — M79.672 PAIN IN BOTH FEET: ICD-10-CM

## 2025-02-21 LAB
ALBUMIN SERPL BCP-MCNC: 3.8 G/DL (ref 3.5–5.2)
ALP SERPL-CCNC: 70 U/L (ref 40–150)
ALT SERPL W/O P-5'-P-CCNC: 26 U/L (ref 10–44)
ANION GAP SERPL CALC-SCNC: 8 MMOL/L (ref 8–16)
AST SERPL-CCNC: 29 U/L (ref 10–40)
BASOPHILS # BLD AUTO: 0.03 K/UL (ref 0–0.2)
BASOPHILS NFR BLD: 0.6 % (ref 0–1.9)
BILIRUB SERPL-MCNC: 1.3 MG/DL (ref 0.1–1)
BUN SERPL-MCNC: 12 MG/DL (ref 6–20)
CALCIUM SERPL-MCNC: 8.7 MG/DL (ref 8.7–10.5)
CHLORIDE SERPL-SCNC: 107 MMOL/L (ref 95–110)
CHOLEST SERPL-MCNC: 152 MG/DL (ref 120–199)
CHOLEST/HDLC SERPL: 4.1 {RATIO} (ref 2–5)
CO2 SERPL-SCNC: 25 MMOL/L (ref 23–29)
CREAT SERPL-MCNC: 0.9 MG/DL (ref 0.5–1.4)
DIFFERENTIAL METHOD BLD: ABNORMAL
EOSINOPHIL # BLD AUTO: 0.2 K/UL (ref 0–0.5)
EOSINOPHIL NFR BLD: 4.5 % (ref 0–8)
ERYTHROCYTE [DISTWIDTH] IN BLOOD BY AUTOMATED COUNT: 12.4 % (ref 11.5–14.5)
EST. GFR  (NO RACE VARIABLE): >60 ML/MIN/1.73 M^2
ESTIMATED AVG GLUCOSE: 100 MG/DL (ref 68–131)
GLUCOSE SERPL-MCNC: 94 MG/DL (ref 70–110)
HBA1C MFR BLD: 5.1 % (ref 4–5.6)
HCT VFR BLD AUTO: 43 % (ref 40–54)
HCV AB SERPL QL IA: NORMAL
HDLC SERPL-MCNC: 37 MG/DL (ref 40–75)
HDLC SERPL: 24.3 % (ref 20–50)
HGB BLD-MCNC: 14.4 G/DL (ref 14–18)
HIV 1+2 AB+HIV1 P24 AG SERPL QL IA: NORMAL
IMM GRANULOCYTES # BLD AUTO: 0.01 K/UL (ref 0–0.04)
IMM GRANULOCYTES NFR BLD AUTO: 0.2 % (ref 0–0.5)
LDLC SERPL CALC-MCNC: 103.2 MG/DL (ref 63–159)
LYMPHOCYTES # BLD AUTO: 1.6 K/UL (ref 1–4.8)
LYMPHOCYTES NFR BLD: 29.7 % (ref 18–48)
MCH RBC QN AUTO: 31.4 PG (ref 27–31)
MCHC RBC AUTO-ENTMCNC: 33.5 G/DL (ref 32–36)
MCV RBC AUTO: 94 FL (ref 82–98)
MONOCYTES # BLD AUTO: 0.4 K/UL (ref 0.3–1)
MONOCYTES NFR BLD: 7.6 % (ref 4–15)
NEUTROPHILS # BLD AUTO: 3.1 K/UL (ref 1.8–7.7)
NEUTROPHILS NFR BLD: 57.4 % (ref 38–73)
NONHDLC SERPL-MCNC: 115 MG/DL
NRBC BLD-RTO: 0 /100 WBC
PLATELET # BLD AUTO: 241 K/UL (ref 150–450)
PMV BLD AUTO: 11.2 FL (ref 9.2–12.9)
POTASSIUM SERPL-SCNC: 4.3 MMOL/L (ref 3.5–5.1)
PROT SERPL-MCNC: 6.6 G/DL (ref 6–8.4)
RBC # BLD AUTO: 4.59 M/UL (ref 4.6–6.2)
SODIUM SERPL-SCNC: 140 MMOL/L (ref 136–145)
TRIGL SERPL-MCNC: 59 MG/DL (ref 30–150)
TSH SERPL DL<=0.005 MIU/L-ACNC: 1.43 UIU/ML (ref 0.4–4)
WBC # BLD AUTO: 5.38 K/UL (ref 3.9–12.7)

## 2025-02-21 PROCEDURE — 80061 LIPID PANEL: CPT | Performed by: FAMILY MEDICINE

## 2025-02-21 PROCEDURE — 85025 COMPLETE CBC W/AUTO DIFF WBC: CPT | Performed by: FAMILY MEDICINE

## 2025-02-21 PROCEDURE — 36415 COLL VENOUS BLD VENIPUNCTURE: CPT | Mod: PO | Performed by: FAMILY MEDICINE

## 2025-02-21 PROCEDURE — 83036 HEMOGLOBIN GLYCOSYLATED A1C: CPT | Performed by: FAMILY MEDICINE

## 2025-02-21 PROCEDURE — 86803 HEPATITIS C AB TEST: CPT | Performed by: FAMILY MEDICINE

## 2025-02-21 PROCEDURE — 80053 COMPREHEN METABOLIC PANEL: CPT | Performed by: FAMILY MEDICINE

## 2025-02-21 PROCEDURE — 84443 ASSAY THYROID STIM HORMONE: CPT | Performed by: FAMILY MEDICINE

## 2025-02-21 PROCEDURE — 87389 HIV-1 AG W/HIV-1&-2 AB AG IA: CPT | Performed by: FAMILY MEDICINE

## 2025-02-21 NOTE — PROGRESS NOTES
Subjective:      Patient ID: Danilo Krause is a 45 y.o. male.    Chief Complaint: Heel Pain    Sharp deep pain in the bottom of the right and left heel.  Chronic condition present on and off for years.  This exacerbations been gradual onset worsening over the past 3 months.  Aggravated with increased weight-bearing particularly after rest.  No prior medical treatment.  No self-treatment.  Denies trauma and surgery both feet.    Review of Systems   Constitutional: Negative for chills, diaphoresis, fever, malaise/fatigue and night sweats.   Cardiovascular:  Negative for claudication, cyanosis, leg swelling and syncope.   Skin:  Negative for color change, dry skin, nail changes, rash, suspicious lesions and unusual hair distribution.   Musculoskeletal:  Negative for falls, joint pain, joint swelling, muscle cramps, muscle weakness and stiffness.   Gastrointestinal:  Negative for constipation, diarrhea, nausea and vomiting.   Neurological:  Negative for brief paralysis, disturbances in coordination, focal weakness, numbness, paresthesias, sensory change and tremors.           Objective:      Physical Exam  Constitutional:       General: He is not in acute distress.     Appearance: He is well-developed. He is not diaphoretic.   Cardiovascular:      Pulses:           Popliteal pulses are 2+ on the right side and 2+ on the left side.        Dorsalis pedis pulses are 2+ on the right side and 2+ on the left side.        Posterior tibial pulses are 2+ on the right side and 2+ on the left side.      Comments: Capillary refill 3 seconds all toes/distal feet, all toes/both feet warm to touch.      Negative lymphadenopathy bilateral popliteal fossa and tarsal tunnel.      Negavie lower extremity edema bilateral.    Musculoskeletal:      Right ankle: No swelling, deformity, ecchymosis or lacerations. Normal range of motion. Normal pulse.      Right Achilles Tendon: Normal. No defects. Sheets's test negative.      Comments:  Sharp deep pain to palpation inferior heel right and left at medial calcaneal tubercle without ecchymosis, erythema, edema, or cardinal signs infection, and no signs of trauma.    Ankle dorsiflexion decreased at <10 degrees bilateral with moderate increase with knee flexion bilateral.    Otherwise, Normal angle, base, station of gait. All ten toes without clubbing, cyanosis, or signs of ischemia.  No pain to palpation bilateral lower extremities.  Range of motion, stability, muscle strength, and muscle tone normal bilateral feet and legs.    Lymphadenopathy:      Lower Body: No right inguinal adenopathy. No left inguinal adenopathy.      Comments: Negative lymphadenopathy bilateral popliteal fossa and tarsal tunnel.    Negative lymphangitic streaking bilateral feet/ankles/legs.   Skin:     General: Skin is warm and dry.      Capillary Refill: Capillary refill takes 2 to 3 seconds.      Coloration: Skin is not pale.      Findings: No abrasion, bruising, burn, ecchymosis, erythema, laceration, lesion or rash.      Nails: There is no clubbing.      Comments: Skin is normal age and health appropriate color, turgor, texture, and temperature bilateral lower extremities without ulceration, hyperpigmentation, discoloration, masses nodules or cords palpated.  No ecchymosis, erythema, edema, or cardinal signs of infection bilateral lower extremities.      Neurological:      Mental Status: He is alert and oriented to person, place, and time.      Sensory: No sensory deficit.      Motor: No tremor, atrophy or abnormal muscle tone.      Gait: Gait normal.      Deep Tendon Reflexes:      Reflex Scores:       Patellar reflexes are 2+ on the right side and 2+ on the left side.       Achilles reflexes are 2+ on the right side and 2+ on the left side.     Comments: Negative tinel sign to percussion sural, superficial peroneal, deep peroneal, saphenous, and posterior tibial nerves right and left ankles and feet.     Psychiatric:          Behavior: Behavior is cooperative.           Assessment:       Encounter Diagnosis   Name Primary?    Pain in both feet          Plan:       Danilo was seen today for heel pain.    Diagnoses and all orders for this visit:    Pain in both feet  -     Ambulatory referral/consult to Podiatry      I counseled the patient on his conditions, their implications and medical management.        Patient will stretch the tendo achilles complex three times daily as demonstrated in the office.  Literature was dispensed illustrating proper stretching technique.    I applied a plantar rest strapping to the patient's right and left foot to offload symptomatic area, support the arch, and relieve pain.    Patient will obtain over the counter arch supports and wear them in shoes whenever possible.  Athletic shoes intended for walking or running are usually best.    The patient was advised that NSAID-type medications have two very important potential side effects: gastrointestinal irritation including hemorrhage and renal injuries. He was asked to take the medication with food and to stop if he experiences any GI upset. I asked him to call for vomiting, abdominal pain or black/bloody stools. The patient expresses understanding of these issues and questions were answered.    Discussed conservative treatment with shoes of adequate dimensions, material, and style to alleviate symptoms and delay or prevent surgical intervention.    meloxicam          Follow up in about 1 month (around 3/21/2025).

## 2025-02-24 ENCOUNTER — RESULTS FOLLOW-UP (OUTPATIENT)
Dept: FAMILY MEDICINE | Facility: CLINIC | Age: 45
End: 2025-02-24

## 2025-03-08 ENCOUNTER — OFFICE VISIT (OUTPATIENT)
Dept: URGENT CARE | Facility: CLINIC | Age: 45
End: 2025-03-08
Payer: COMMERCIAL

## 2025-03-08 VITALS
HEART RATE: 91 BPM | RESPIRATION RATE: 20 BRPM | SYSTOLIC BLOOD PRESSURE: 124 MMHG | BODY MASS INDEX: 45.2 KG/M2 | DIASTOLIC BLOOD PRESSURE: 85 MMHG | TEMPERATURE: 98 F | HEIGHT: 67 IN | WEIGHT: 288 LBS | OXYGEN SATURATION: 98 %

## 2025-03-08 DIAGNOSIS — R05.2 SUBACUTE COUGH: ICD-10-CM

## 2025-03-08 DIAGNOSIS — R05.8 POST-VIRAL COUGH SYNDROME: Primary | ICD-10-CM

## 2025-03-08 PROCEDURE — 71046 X-RAY EXAM CHEST 2 VIEWS: CPT | Mod: S$GLB,,, | Performed by: RADIOLOGY

## 2025-03-08 PROCEDURE — 99213 OFFICE O/P EST LOW 20 MIN: CPT | Mod: S$GLB,,, | Performed by: FAMILY MEDICINE

## 2025-03-08 RX ORDER — AMOXICILLIN AND CLAVULANATE POTASSIUM 875; 125 MG/1; MG/1
1 TABLET, FILM COATED ORAL 2 TIMES DAILY
COMMUNITY
Start: 2025-03-02 | End: 2025-03-12

## 2025-03-08 RX ORDER — CETIRIZINE HYDROCHLORIDE 10 MG/1
10 TABLET ORAL DAILY
Qty: 30 TABLET | Refills: 0 | Status: SHIPPED | OUTPATIENT
Start: 2025-03-08 | End: 2025-04-07

## 2025-03-08 RX ORDER — GUAIFENESIN 600 MG/1
1200 TABLET, EXTENDED RELEASE ORAL 2 TIMES DAILY
Qty: 40 TABLET | Refills: 0 | Status: SHIPPED | OUTPATIENT
Start: 2025-03-08 | End: 2025-03-18

## 2025-03-08 RX ORDER — FLUTICASONE PROPIONATE 50 MCG
1 SPRAY, SUSPENSION (ML) NASAL
COMMUNITY
Start: 2025-03-02 | End: 2025-03-12

## 2025-03-08 RX ORDER — ALBUTEROL SULFATE 90 UG/1
1 INHALANT RESPIRATORY (INHALATION) EVERY 6 HOURS PRN
COMMUNITY
Start: 2025-03-02

## 2025-03-08 RX ORDER — BENZONATATE 200 MG/1
200 CAPSULE ORAL 3 TIMES DAILY PRN
COMMUNITY
Start: 2025-03-02 | End: 2025-03-12

## 2025-03-08 RX ORDER — AZELASTINE 1 MG/ML
1 SPRAY, METERED NASAL 2 TIMES DAILY
Qty: 30 ML | Refills: 0 | Status: SHIPPED | OUTPATIENT
Start: 2025-03-08 | End: 2026-03-08

## 2025-03-08 RX ORDER — PROMETHAZINE HYDROCHLORIDE AND DEXTROMETHORPHAN HYDROBROMIDE 6.25; 15 MG/5ML; MG/5ML
5 SYRUP ORAL
COMMUNITY
Start: 2025-03-02

## 2025-03-08 RX ORDER — BENZONATATE 100 MG/1
200 CAPSULE ORAL 3 TIMES DAILY PRN
Qty: 60 CAPSULE | Refills: 0 | Status: SHIPPED | OUTPATIENT
Start: 2025-03-08 | End: 2025-03-18

## 2025-03-08 RX ORDER — FLUTICASONE PROPIONATE 50 MCG
1 SPRAY, SUSPENSION (ML) NASAL DAILY
Qty: 16 G | Refills: 0 | Status: SHIPPED | OUTPATIENT
Start: 2025-03-08

## 2025-03-08 NOTE — PATIENT INSTRUCTIONS
General Discharge Instructions   PLEASE READ YOUR DISCHARGE INSTRUCTIONS ENTIRELY AS IT CONTAINS IMPORTANT INFORMATION.  If you were prescribed a narcotic or controlled medication, do not drive or operate heavy equipment or machinery while taking these medications.  If you were prescribed antibiotics, please take them to completion.  You must understand that you've received an Urgent Care treatment only and that you may be released before all your medical problems are known or treated. You, the patient, will arrange for follow up care as instructed.    OVER THE COUNTER RECOMMENDATIONS/SUGGESTIONS.    Make sure to stay well hydrated.    Use Nasal Saline to mechanically move any post nasal drip from your eustachian tube or from the back of your throat.    Use warm salt water gargles to ease your throat pain. Warm salt water gargles as needed for sore throat- 1/2 tsp salt to 1 cup warm water, gargle as desired.    Use an antihistamine such as Claritin, Zyrtec or Allegra to dry you out.    Use pseudoephedrine (behind the counter) to decongest. Pseudoephedrine 30 mg up to 240 mg /day. It can raise your blood pressure and give you palpitations.    Use mucinex (guaifenesin) to break up mucous up to 2400mg/day to loosen any mucous.    The mucinex DM pill has a cough suppressant that can be sedating. It can be used at night to stop the tickle at the back of your throat.    You can use Mucinex D (it has guaifenesin and a high dose of pseudoephedrine) in the mornings to help decongest.    Use Afrin in each nare for no longer than 3 days, as it is addictive. It can also dry out your mucous membranes and cause elevated blood pressure. This is especially useful if you are flying.    Use Flonase 1-2 sprays/nostril per day. It is a local acting steroid nasal spray, if you develop a bloody nose, stop using the medication immediately.    Sometimes Nyquil at night is beneficial to help you get some rest, however it is sedating and it  does have an antihistamine, and tylenol.    Honey is a natural cough suppressant that can be used.    Tylenol up to 4,000 mg a day is safe for short periods and can be used for body aches, pain, and fever. However in high doses and prolonged use it can cause liver irritation.    Ibuprofen is a non-steroidal anti-inflammatory that can be used for body aches, pain, and fever.However it can also cause stomach irritation if over used.     Follow up with your PCP or specialty clinic as instructed in the next 2-3 days if not improved or as needed. You can call [phone] to schedule an appointment with appropriate provider.      If you condition worsens, we recommend that you receive another evaluation at the emergency room immediately or contact your primary medical clinic's after hours call service to discuss your concerns.      Please return here or go to the Emergency Department for any concerns or worsening condition.   You can also call [phone] to schedule an appointment with the appropriate provider.    Please return here or go to the Emergency Department for any concerns or worsening of condition.    Thank you for choosing [facility]!    Our goal in the Urgent Care is to always provide outstanding medical care. You may receive a survey by mail or e-mail in the next week regarding your experience today. We would greatly appreciate you completing and returning the survey. Your feedback provides us with a way to recognize our staff who provide very good care, and it helps us learn how to improve when your experience was below our aspiration of excellence.      We appreciate you trusting us with your medical care. We hope you feel better soon. We will be happy to take care of you for all of your future medical needs.    Sincerely,    BECK Doss  Cough   If your condition worsens or fails to improve we recommend that you receive another evaluation at the ER immediately or contact your PCP  "to discuss your concerns or return here. You must understand that you've received an urgent care treatment only and that you may be released before all your medical problems are known or treated. You the patient will arrange for follwp care as instructed. .  Rest and fluids are important  Can use honey with all to soothe your throat  Take prescription cough meds (pills) as prescribed; take prescription cough syrup at night as needed for cough.  Do not take both the prescribed cough pills and syrup at the same time or within 6 hours of each other.  Do not take the cough syrup with any other sedative medication as it can can cause drowsiness. Do not operate any heavy machinery, drink or drive while taking the cough syrup.   -  Flonase (fluticasone) is a nasal spray which is available over the counter and may help with your symptoms.   -  If you have hypertension avoid using the "D" which is the decongestant.  Instead you can use Coricidin HBP for cold and cough symptoms.    -  If you just have drainage you can take plain Zyrtec, Claritin or Allegra   -  Tylenol or ibuprofen can also be used as directed for pain unless you have an allergy to them or medical condition such as stomach ulcers, kidney or liver disease or blood thinners etc for which you should not be taking these type of medications.   Please follow up with your primary care doctor or specialist in the next 48-72hrs as needed and if no improvement  If you  smoke, please stop smoking.    "

## 2025-03-08 NOTE — PROGRESS NOTES
"Subjective:      Patient ID: Danilo Krause is a 45 y.o. male.    Vitals:  height is 5' 7" (1.702 m) and weight is 130.6 kg (288 lb). His oral temperature is 98.1 °F (36.7 °C). His blood pressure is 124/85 and his pulse is 91. His respiration is 20 and oxygen saturation is 98%.     Chief Complaint: Cough    44 y/o male presents to the office with a chief complaint of cough, ongoing 3 weeks. Associated SOB. Patient visited Urgent Care on 03/02/2025 in which he was diagnosed with sinusitis and given Albuterol, Promethezine, Flonase, Augmentin, Benzonatate, and dexamethasone injection for relief. Patient feels the medications have not been as helpful. He states the cough he lessened in severity, however, the SOB is still prominent for about 3 weeks. Patient states he has been experiencing restless nights due to cough and inability to lie flat. Denies swelling in legs or chest pain. Patient mention family history of cardiovascular diseases.  He denies any weight gain, no cough noted during exam.    Cough  This is a new problem. The current episode started 1 to 4 weeks ago. The problem has been unchanged. The problem occurs constantly. The cough is Productive of sputum. Associated symptoms include shortness of breath. Pertinent negatives include no chest pain, chills, fever or wheezing. Nothing aggravates the symptoms. He has tried prescription cough suppressant for the symptoms. The treatment provided no relief.     Constitution: Negative for activity change, appetite change, chills, sweating, fatigue, fever, unexpected weight change and generalized weakness.   Cardiovascular:  Positive for sob on exertion. Negative for chest pain, leg swelling, palpitations and passing out.   Respiratory:  Positive for cough, sputum production and shortness of breath. Negative for chest tightness, wheezing and asthma.    Gastrointestinal:  Negative for abdominal pain, nausea and vomiting.   Musculoskeletal:  Negative for pain with " walking and muscle cramps.   Allergic/Immunologic: Negative for asthma.   Neurological:  Negative for dizziness, altered mental status and loss of consciousness.   Psychiatric/Behavioral:  Negative for altered mental status.       Objective:     Physical Exam   Constitutional: He is oriented to person, place, and time. He appears well-developed.  Non-toxic appearance. He does not appear ill. No distress. obesity  HENT:   Head: Normocephalic and atraumatic.   Ears:   Right Ear: External ear normal.   Left Ear: External ear normal.   Nose: Nose normal.   Mouth/Throat: Oropharynx is clear and moist.   Eyes: Conjunctivae, EOM and lids are normal.   Neck: Trachea normal and phonation normal. Neck supple.   Cardiovascular: Normal rate and regular rhythm.   Pulmonary/Chest: Effort normal and breath sounds normal.   Musculoskeletal: Normal range of motion.         General: Normal range of motion.      Right lower leg: No edema.      Left lower leg: No edema.   Neurological: He is alert and oriented to person, place, and time.   Skin: Skin is warm, dry, intact and not diaphoretic.   Psychiatric: His speech is normal and behavior is normal. Judgment and thought content normal.   Nursing note and vitals reviewed.    XR CHEST PA AND LATERAL  Result Date: 3/8/2025  EXAMINATION: XR CHEST PA AND LATERAL CLINICAL HISTORY: Subacute cough TECHNIQUE: PA and lateral views of the chest were performed. COMPARISON: Chest radiograph performed 03/22/2020 FINDINGS: Grossly unchanged cardiac contours.  Mild interstitial coarsening is noted. No definite pneumothorax or large volume pleural effusion. No acute findings in the visualized abdomen Osseous and soft tissue structures appear without definite acute change.     Mild interstitial coarsening bilaterally may relate to subsegmental atelectasis, pulmonary vascular/edema, infection, and/or noninfectious inflammatory process. Electronically signed by: Ancelmo Robbins Date:    03/08/2025  Time:    11:46      Assessment:     1. Post-viral cough syndrome    2. Subacute cough        Plan:     Chest x-ray without focal opacity, I did review with him that postviral cough can linger up to 8 weeks.  We did review PE, he is negative wells and PERC, also did review congestive heart failure, but patient has no swelling or weight gain.  Able to ambulate down the hallway without acute distress or significant shortness of breath.  I did advise him to follow up with his PCP in about 2 weeks if no improvement.  Did review over-the-counter medications and further treatment.    12:05 PM called and reviewed chest x-ray with patient, he verified full name and date of birth.  Reviewed mild interstitial coarsening, could be related to atelectasis, pulmonary vascular edema infection.  He is going to finish the Augmentin that was provided., I did review with him I could order a CT chest for further evaluation he would like to hold off on this and try medications that we reviewed in clinic and have a follow-up if no improvement.    Discussed results/diagnosis/plan with patient in clinic. Strict precautions given to patient to monitor for worsening signs and symptoms. Advised to follow up with PCP or specialist.    Explained side effects of medications prescribed with patient and informed him/her to discontinue use if he/she has any side effects and to inform UC or PCP if this occurs. All questions answered. Strict ED verses clinic return precautions stressed and given in depth. Advised if symptoms worsens of fail to improve he/she should go to the Emergency Room. Discharge and follow-up instructions given verbally/printed with the patient who expressed understanding and willingness to comply with my recommendations. Patient voiced understanding and in agreement with current treatment plan. Patient exits the exam room in no acute distress. Conversant and engaged during discharge discussion, verbalized understanding.       Post-viral cough syndrome  -     azelastine (ASTELIN) 137 mcg (0.1 %) nasal spray; 1 spray (137 mcg total) by Nasal route 2 (two) times daily.  Dispense: 30 mL; Refill: 0  -     guaiFENesin (MUCINEX) 600 mg 12 hr tablet; Take 2 tablets (1,200 mg total) by mouth 2 (two) times daily. for 10 days  Dispense: 40 tablet; Refill: 0  -     cetirizine (ZYRTEC) 10 MG tablet; Take 1 tablet (10 mg total) by mouth once daily.  Dispense: 30 tablet; Refill: 0  -     benzonatate (TESSALON) 100 MG capsule; Take 2 capsules (200 mg total) by mouth 3 (three) times daily as needed.  Dispense: 60 capsule; Refill: 0  -     fluticasone propionate (FLONASE) 50 mcg/actuation nasal spray; 1 spray (50 mcg total) by Each Nostril route once daily.  Dispense: 16 g; Refill: 0    Subacute cough  -     XR CHEST PA AND LATERAL; Future; Expected date: 03/08/2025  -     azelastine (ASTELIN) 137 mcg (0.1 %) nasal spray; 1 spray (137 mcg total) by Nasal route 2 (two) times daily.  Dispense: 30 mL; Refill: 0  -     guaiFENesin (MUCINEX) 600 mg 12 hr tablet; Take 2 tablets (1,200 mg total) by mouth 2 (two) times daily. for 10 days  Dispense: 40 tablet; Refill: 0  -     cetirizine (ZYRTEC) 10 MG tablet; Take 1 tablet (10 mg total) by mouth once daily.  Dispense: 30 tablet; Refill: 0  -     benzonatate (TESSALON) 100 MG capsule; Take 2 capsules (200 mg total) by mouth 3 (three) times daily as needed.  Dispense: 60 capsule; Refill: 0  -     fluticasone propionate (FLONASE) 50 mcg/actuation nasal spray; 1 spray (50 mcg total) by Each Nostril route once daily.  Dispense: 16 g; Refill: 0          Medical Decision Making:   History:   Old Medical Records: I decided to obtain old medical records.  Old Records Summarized: records from clinic visits and records from another hospital.    Additional MDM:   PERC Rule:   Age is greater than or equal to 50 = 0.0  Heart Rate is greater than or equal to 100 = 0.0  SaO2 on room air < 95% = 0.0  Unilateral leg swelling =  0.0  Hemoptysis = 0.0  Recent surgery or trauma = 0.0  Prior PE or DVT =  0.0  Hormone use = 0.00  PERC Score = 0        Well's Criteria Score:  -Clinical symptoms of DVT (leg swelling, pain with palpation) = 0.0  -PE is #1 diagnosis OR equally likely =            0.0  -Heart Rate >100 =   0.0  -Immobilization (= or > than 3 days) or surgery in the previous 4 weeks = 0.0  -Previous DVT/PE = 0.0  -Hemoptysis =          0.0  -Malignancy =           0.0  Well's Probability Score =    0

## 2025-03-08 NOTE — PROGRESS NOTES
Subjective:      Patient ID: Danilo Krause is a 45 y.o. male.    Vitals:  vitals were not taken for this visit.     Chief Complaint: Cough    Pt is here for a cough. Pt says this has been going on for 3 weeks now. Pt went somewhere to be seen and was prescribed medicine but it hasn't improved his symp. Pt mentioned he has been able to catch his breath.     Cough  This is a new problem. The current episode started 1 to 4 weeks ago. The problem has been unchanged. The problem occurs constantly. The cough is Productive of sputum. Associated symptoms include shortness of breath. Nothing aggravates the symptoms. He has tried prescription cough suppressant for the symptoms. The treatment provided no relief.     Respiratory:  Positive for cough and shortness of breath.       Objective:     Physical Exam    Assessment:     No diagnosis found.    Plan:       There are no diagnoses linked to this encounter.

## 2025-03-10 ENCOUNTER — TELEPHONE (OUTPATIENT)
Dept: FAMILY MEDICINE | Facility: CLINIC | Age: 45
End: 2025-03-10
Payer: COMMERCIAL

## 2025-03-10 NOTE — TELEPHONE ENCOUNTER
----- Message from Summer sent at 3/10/2025 10:25 AM CDT -----  Regarding: x ray  Type:  Patient Returning Call  Name of who is calling:pt wife   What is request in detail:Pt is requesting a call back in regards to x ray, pt went to an [facility] urgent care over the weekend and received x-rays.  Pt wife would like for his pcp to go over xray if possible see if he needs to come in.  Can clinic reply by [facility]:no  [facility] [phone]   Text paged to [facility],      Just SANDY, pt's blood culture came out: Gram positive cocci in clusters. thanks, ED Gala, [phone]

## 2025-03-11 ENCOUNTER — OFFICE VISIT (OUTPATIENT)
Dept: FAMILY MEDICINE | Facility: CLINIC | Age: 45
End: 2025-03-11
Payer: COMMERCIAL

## 2025-03-11 DIAGNOSIS — E66.01 MORBID OBESITY WITH BMI OF 40.0-44.9, ADULT: ICD-10-CM

## 2025-03-11 DIAGNOSIS — R06.02 SHORT OF BREATH ON EXERTION: Primary | ICD-10-CM

## 2025-03-11 PROCEDURE — G2211 COMPLEX E/M VISIT ADD ON: HCPCS | Mod: 95,,, | Performed by: FAMILY MEDICINE

## 2025-03-11 PROCEDURE — 3044F HG A1C LEVEL LT 7.0%: CPT | Mod: CPTII,95,, | Performed by: FAMILY MEDICINE

## 2025-03-11 PROCEDURE — 1159F MED LIST DOCD IN RCRD: CPT | Mod: CPTII,95,, | Performed by: FAMILY MEDICINE

## 2025-03-11 PROCEDURE — 1160F RVW MEDS BY RX/DR IN RCRD: CPT | Mod: CPTII,95,, | Performed by: FAMILY MEDICINE

## 2025-03-11 PROCEDURE — 98006 SYNCH AUDIO-VIDEO EST MOD 30: CPT | Mod: 95,,, | Performed by: FAMILY MEDICINE

## 2025-03-11 NOTE — PROGRESS NOTES
"Telemedicine Office Visit    Danilodev Krause    1980  [id number]    Subjective     The patient location is: work   The chief complaint leading to consultation is: shortness of breath   Visit type: Virtual visit with synchronous audio and video  Total time spent with patient: 30  minutes   Each patient to whom he or she provides medical services by telemedicine is:  (1) informed of the relationship between the physician and patient and the respective role of any other health care provider with respect to management of the patient; and (2) notified that he or she may decline to receive medical services by telemedicine and may withdraw from such care at any time.    History of Present Illness    CHIEF COMPLAINT:  Patient presents today with worsening shortness of breath and chest discomfort.    RESPIRATORY SYMPTOMS:  He reports shortness of breath that began 3 weeks ago with significant worsening over the last 8-10 days. He experiences dyspnea with minimal exertion, including activities such as tying shoes, getting dressed, and yard work, requiring rest periods to recover. He denies shortness of breath at rest or while talking. Oxygen levels at urgent care were 95-96%. Chest XR showed "Mild interstitial coarsening bilaterally may relate to subsegmental atelectasis, pulmonary vascular/edema, infection, and/or noninfectious inflammatory process. "    CHEST AND UPPER ABDOMINAL DISCOMFORT:  He reports constant chest and upper abdominal discomfort for approximately 3 weeks, with significant worsening over the past week and a half. He describes abdominal pressure which he states attributes to him feeling fullness. He experiences persistent discomfort at the top of his abdomen, describing it as feeling like having eaten a big meal constantly. He denies chest pain but reports difficulty with certain positions. The discomfort has affected his sleep, requiring him to sleep in a recliner instead of his " bed.      ROS:  General: no fever, no chills, no fatigue, no weight gain, no weight loss  Eyes: no vision changes, no redness, no discharge  ENT: no ear pain, no nasal congestion, no sore throat  Cardiovascular: no chest pain, no palpitations, no lower extremity edema  Respiratory: no cough, +shortness of breath, +difficulty breathing  Gastrointestinal: no abdominal pain, no nausea, no vomiting, no diarrhea, no constipation, no blood in stool  Genitourinary: no dysuria, no hematuria, no frequency  Musculoskeletal: no joint pain, no muscle pain  Skin: no rash, no lesion  Neurological: no headache, no dizziness, no numbness, no tingling  Psychiatric: no anxiety, no depression, +sleep difficulty           There were no vitals taken for this visit.  Physical Exam  Constitutional:       General: He is not in acute distress.     Appearance: He is well-developed. He is not diaphoretic.   HENT:      Head: Normocephalic and atraumatic.      Right Ear: External ear normal.      Left Ear: External ear normal.      Nose: Nose normal.   Eyes:      Conjunctiva/sclera: Conjunctivae normal.   Pulmonary:      Effort: Pulmonary effort is normal.   Musculoskeletal:      Cervical back: Normal range of motion.   Skin:     Findings: No rash.   Neurological:      Mental Status: He is alert and oriented to person, place, and time.   Psychiatric:         Behavior: Behavior normal.         Thought Content: Thought content normal.         Judgment: Judgment normal.             Plan     Assessment & Plan      FOLLOW UP:   Follow up after CT and cardiology appointment.           Problem List Items Addressed This Visit          Endocrine    Morbid obesity with BMI of 40.0-44.9, adult  Noted BMI        Other Visit Diagnoses         Short of breath on exertion    -  Primary    Relevant Orders    CT Chest Without Contrast    Ambulatory referral/consult to Cardiology   Evaluated the patient's extreme shortness of breath, which has been ongoing for  about 3 weeks and worsened over the last 8-10 days.   Noted that the patient becomes very winded with minimal activity, including tying shoes and getting dressed.   Confirmed that the patient's oxygen levels were 95-96% when checked at urgent care.   Discussed differential diagnosis including pneumonia, pulmonary embolism, fluid overload, pulmonary hypertension, and congestive heart failure.   Educated the patient on possible causes of shortness of breath, including lung and heart conditions.   Ordered an urgent CT chest to rule out pneumonia or pulmonary embolism.   Considered referral to a cardiologist for further evaluation.   Instructed the patient to monitor symptoms and go to the emergency room if shortness of breath worsens, especially when at rest.   Informed the patient about the typical duration of respiratory infections (6-8 weeks).   Evaluated the patient's severe shortness of breath with minimal exertion, affecting daily activities and work.   Assessed that the dyspnea could be due to various conditions including lung or heart issues.    CHEST/ABDOMINAL PRESSURE:   Noted the patient's report an uncomfortable feeling of pressure in the upper stomach area.   Evaluated that the feeling of pressure or heaviness in the chest could be a sign of a heart attack.   Recommend going to the emergency room for a cardiac workup to rule out heart issues.   Advised the patient to go to the emergency room if experiencing severe chest pain or if the pressure sensation worsens.   Discussed the potential need for cardiac workup, including possibility a stress test to evaluate heart function during exertion.                  This note was generated with the assistance of ambient listening technology. Verbal consent was obtained by the patient and accompanying visitor(s) for the recording of patient appointment to facilitate this note. I attest to having reviewed and edited the generated note for accuracy, though some syntax  or spelling errors may persist. Please contact the author of this note for any clarification.

## 2025-03-12 ENCOUNTER — HOSPITAL ENCOUNTER (OUTPATIENT)
Dept: RADIOLOGY | Facility: HOSPITAL | Age: 45
Discharge: HOME | End: 2025-03-12
Attending: FAMILY MEDICINE
Payer: COMMERCIAL

## 2025-03-12 ENCOUNTER — RESULTS FOLLOW-UP (OUTPATIENT)
Dept: FAMILY MEDICINE | Facility: CLINIC | Age: 45
End: 2025-03-12

## 2025-03-12 DIAGNOSIS — J18.9 PNEUMONIA OF BOTH LOWER LOBES DUE TO INFECTIOUS ORGANISM: Primary | ICD-10-CM

## 2025-03-12 DIAGNOSIS — R06.02 SHORT OF BREATH ON EXERTION: ICD-10-CM

## 2025-03-12 PROCEDURE — 71250 CT THORAX DX C-: CPT | Mod: 26,,, | Performed by: RADIOLOGY

## 2025-03-12 PROCEDURE — 71250 CT THORAX DX C-: CPT | Mod: TC

## 2025-03-12 RX ORDER — AZITHROMYCIN 250 MG/1
TABLET, FILM COATED ORAL
Qty: 6 TABLET | Refills: 0 | Status: SHIPPED | OUTPATIENT
Start: 2025-03-12 | End: 2025-03-17

## 2025-03-14 ENCOUNTER — PATIENT MESSAGE (OUTPATIENT)
Dept: ELECTROPHYSIOLOGY | Facility: CLINIC | Age: 45
End: 2025-03-14
Payer: COMMERCIAL

## 2025-03-14 ENCOUNTER — ANESTHESIA (OUTPATIENT)
Dept: MEDSURG UNIT | Facility: HOSPITAL | Age: 45
End: 2025-03-14
Payer: COMMERCIAL

## 2025-03-14 ENCOUNTER — ANESTHESIA EVENT (OUTPATIENT)
Dept: MEDSURG UNIT | Facility: HOSPITAL | Age: 45
End: 2025-03-14
Payer: COMMERCIAL

## 2025-03-14 ENCOUNTER — OFFICE VISIT (OUTPATIENT)
Dept: CARDIOLOGY | Facility: CLINIC | Age: 45
End: 2025-03-14
Payer: COMMERCIAL

## 2025-03-14 ENCOUNTER — HOSPITAL ENCOUNTER (OUTPATIENT)
Dept: CARDIOLOGY | Facility: HOSPITAL | Age: 45
Discharge: HOME | End: 2025-03-14
Attending: INTERNAL MEDICINE | Admitting: INTERNAL MEDICINE
Payer: COMMERCIAL

## 2025-03-14 ENCOUNTER — HOSPITAL ENCOUNTER (INPATIENT)
Facility: HOSPITAL | Age: 45
LOS: 1 days | Discharge: HOME | DRG: 291 | End: 2025-03-15
Attending: STUDENT IN AN ORGANIZED HEALTH CARE EDUCATION/TRAINING PROGRAM | Admitting: HOSPITALIST
Payer: COMMERCIAL

## 2025-03-14 VITALS
DIASTOLIC BLOOD PRESSURE: 90 MMHG | WEIGHT: 265 LBS | BODY MASS INDEX: 41.59 KG/M2 | HEART RATE: 164 BPM | HEIGHT: 67 IN | SYSTOLIC BLOOD PRESSURE: 112 MMHG

## 2025-03-14 VITALS
HEIGHT: 67 IN | WEIGHT: 285 LBS | BODY MASS INDEX: 44.73 KG/M2 | DIASTOLIC BLOOD PRESSURE: 100 MMHG | SYSTOLIC BLOOD PRESSURE: 130 MMHG

## 2025-03-14 DIAGNOSIS — Z79.01 CHRONIC ANTICOAGULATION: ICD-10-CM

## 2025-03-14 DIAGNOSIS — I48.19 PERSISTENT ATRIAL FIBRILLATION: ICD-10-CM

## 2025-03-14 DIAGNOSIS — I48.19 PERSISTENT ATRIAL FIBRILLATION: Primary | ICD-10-CM

## 2025-03-14 DIAGNOSIS — I48.91 NEW ONSET ATRIAL FIBRILLATION: Primary | ICD-10-CM

## 2025-03-14 DIAGNOSIS — I50.21 ACUTE SYSTOLIC HEART FAILURE: ICD-10-CM

## 2025-03-14 DIAGNOSIS — R06.09 DYSPNEA ON EXERTION: Primary | ICD-10-CM

## 2025-03-14 DIAGNOSIS — Z01.818 PRE-OP TESTING: ICD-10-CM

## 2025-03-14 DIAGNOSIS — I48.91 ATRIAL FIBRILLATION: ICD-10-CM

## 2025-03-14 DIAGNOSIS — R07.9 CHEST PAIN: ICD-10-CM

## 2025-03-14 PROBLEM — H93.11 TINNITUS OF RIGHT EAR: Status: ACTIVE | Noted: 2025-03-14

## 2025-03-14 LAB
ALBUMIN SERPL BCP-MCNC: 3.8 G/DL (ref 3.5–5.2)
ALP SERPL-CCNC: 68 U/L (ref 40–150)
ALT SERPL W/O P-5'-P-CCNC: 51 U/L (ref 10–44)
ANION GAP SERPL CALC-SCNC: 8 MMOL/L (ref 8–16)
AST SERPL-CCNC: 29 U/L (ref 10–40)
BASOPHILS # BLD AUTO: 0.03 K/UL (ref 0–0.2)
BASOPHILS NFR BLD: 0.5 % (ref 0–1.9)
BILIRUB SERPL-MCNC: 1.8 MG/DL (ref 0.1–1)
BNP SERPL-MCNC: 125 PG/ML (ref 0–99)
BSA FOR ECHO PROCEDURE: 2.47 M2
BUN SERPL-MCNC: 12 MG/DL (ref 6–20)
CALCIUM SERPL-MCNC: 8.6 MG/DL (ref 8.7–10.5)
CHLORIDE SERPL-SCNC: 107 MMOL/L (ref 95–110)
CO2 SERPL-SCNC: 27 MMOL/L (ref 23–29)
CREAT SERPL-MCNC: 1 MG/DL (ref 0.5–1.4)
DIFFERENTIAL METHOD BLD: ABNORMAL
EOSINOPHIL # BLD AUTO: 0.1 K/UL (ref 0–0.5)
EOSINOPHIL NFR BLD: 2.3 % (ref 0–8)
ERYTHROCYTE [DISTWIDTH] IN BLOOD BY AUTOMATED COUNT: 13.1 % (ref 11.5–14.5)
EST. GFR  (NO RACE VARIABLE): >60 ML/MIN/1.73 M^2
GLUCOSE SERPL-MCNC: 93 MG/DL (ref 70–110)
HCT VFR BLD AUTO: 46.1 % (ref 40–54)
HGB BLD-MCNC: 15.5 G/DL (ref 14–18)
IMM GRANULOCYTES # BLD AUTO: 0.02 K/UL (ref 0–0.04)
IMM GRANULOCYTES NFR BLD AUTO: 0.3 % (ref 0–0.5)
LYMPHOCYTES # BLD AUTO: 1.7 K/UL (ref 1–4.8)
LYMPHOCYTES NFR BLD: 28.1 % (ref 18–48)
MAGNESIUM SERPL-MCNC: 1.9 MG/DL (ref 1.6–2.6)
MCH RBC QN AUTO: 32 PG (ref 27–31)
MCHC RBC AUTO-ENTMCNC: 33.6 G/DL (ref 32–36)
MCV RBC AUTO: 95 FL (ref 82–98)
MONOCYTES # BLD AUTO: 0.3 K/UL (ref 0.3–1)
MONOCYTES NFR BLD: 5 % (ref 4–15)
NEUTROPHILS # BLD AUTO: 3.8 K/UL (ref 1.8–7.7)
NEUTROPHILS NFR BLD: 63.8 % (ref 38–73)
NRBC BLD-RTO: 0 /100 WBC
OHS QRS DURATION: 76 MS
OHS QTC CALCULATION: 477 MS
PHOSPHATE SERPL-MCNC: 3.4 MG/DL (ref 2.7–4.5)
PLATELET # BLD AUTO: 234 K/UL (ref 150–450)
PMV BLD AUTO: 11.5 FL (ref 9.2–12.9)
POTASSIUM SERPL-SCNC: 3.9 MMOL/L (ref 3.5–5.1)
PROT SERPL-MCNC: 6.8 G/DL (ref 6–8.4)
RBC # BLD AUTO: 4.85 M/UL (ref 4.6–6.2)
SINUS: 3.4 CM
SODIUM SERPL-SCNC: 142 MMOL/L (ref 136–145)
STJ: 3 CM
WBC # BLD AUTO: 6.02 K/UL (ref 3.9–12.7)

## 2025-03-14 PROCEDURE — 83735 ASSAY OF MAGNESIUM: CPT | Performed by: HOSPITALIST

## 2025-03-14 PROCEDURE — D9220A PRA ANESTHESIA: Mod: CRNA,,, | Performed by: STUDENT IN AN ORGANIZED HEALTH CARE EDUCATION/TRAINING PROGRAM

## 2025-03-14 PROCEDURE — 63600175 PHARM REV CODE 636 W HCPCS: Performed by: STUDENT IN AN ORGANIZED HEALTH CARE EDUCATION/TRAINING PROGRAM

## 2025-03-14 PROCEDURE — 5A2204Z RESTORATION OF CARDIAC RHYTHM, SINGLE: ICD-10-PCS | Performed by: INTERNAL MEDICINE

## 2025-03-14 PROCEDURE — 99999 PR PBB SHADOW E&M-EST. PATIENT-LVL III: CPT | Mod: PBBFAC,,, | Performed by: INTERNAL MEDICINE

## 2025-03-14 PROCEDURE — 93319 3D ECHO IMG CGEN CAR ANOMAL: CPT | Mod: ,,, | Performed by: INTERNAL MEDICINE

## 2025-03-14 PROCEDURE — 25000003 PHARM REV CODE 250: Performed by: STUDENT IN AN ORGANIZED HEALTH CARE EDUCATION/TRAINING PROGRAM

## 2025-03-14 PROCEDURE — 37000009 HC ANESTHESIA EA ADD 15 MINS: Performed by: INTERNAL MEDICINE

## 2025-03-14 PROCEDURE — 93005 ELECTROCARDIOGRAM TRACING: CPT

## 2025-03-14 PROCEDURE — 25000003 PHARM REV CODE 250: Performed by: NURSE PRACTITIONER

## 2025-03-14 PROCEDURE — 93312 ECHO TRANSESOPHAGEAL: CPT | Mod: 26,,, | Performed by: INTERNAL MEDICINE

## 2025-03-14 PROCEDURE — 36415 COLL VENOUS BLD VENIPUNCTURE: CPT | Performed by: HOSPITALIST

## 2025-03-14 PROCEDURE — 63600175 PHARM REV CODE 636 W HCPCS: Performed by: INTERNAL MEDICINE

## 2025-03-14 PROCEDURE — 92960 CARDIOVERSION ELECTRIC EXT: CPT | Performed by: INTERNAL MEDICINE

## 2025-03-14 PROCEDURE — 85025 COMPLETE CBC W/AUTO DIFF WBC: CPT | Performed by: HOSPITALIST

## 2025-03-14 PROCEDURE — 93010 ELECTROCARDIOGRAM REPORT: CPT | Mod: ,,, | Performed by: INTERNAL MEDICINE

## 2025-03-14 PROCEDURE — 83880 ASSAY OF NATRIURETIC PEPTIDE: CPT | Performed by: HOSPITALIST

## 2025-03-14 PROCEDURE — 80053 COMPREHEN METABOLIC PANEL: CPT | Performed by: HOSPITALIST

## 2025-03-14 PROCEDURE — 37000008 HC ANESTHESIA 1ST 15 MINUTES: Performed by: INTERNAL MEDICINE

## 2025-03-14 PROCEDURE — 20600001 HC STEP DOWN PRIVATE ROOM

## 2025-03-14 PROCEDURE — 84100 ASSAY OF PHOSPHORUS: CPT | Performed by: HOSPITALIST

## 2025-03-14 PROCEDURE — 92960 CARDIOVERSION ELECTRIC EXT: CPT | Mod: ,,, | Performed by: INTERNAL MEDICINE

## 2025-03-14 PROCEDURE — 25000003 PHARM REV CODE 250: Performed by: HOSPITALIST

## 2025-03-14 PROCEDURE — D9220A PRA ANESTHESIA: Mod: ANES,,, | Performed by: ANESTHESIOLOGY

## 2025-03-14 PROCEDURE — 93320 DOPPLER ECHO COMPLETE: CPT | Mod: 26,,, | Performed by: INTERNAL MEDICINE

## 2025-03-14 PROCEDURE — 93325 DOPPLER ECHO COLOR FLOW MAPG: CPT

## 2025-03-14 PROCEDURE — 25500020 PHARM REV CODE 255: Performed by: INTERNAL MEDICINE

## 2025-03-14 RX ORDER — CETIRIZINE HYDROCHLORIDE 10 MG/1
10 TABLET ORAL DAILY
Status: DISCONTINUED | OUTPATIENT
Start: 2025-03-15 | End: 2025-03-15 | Stop reason: HOSPADM

## 2025-03-14 RX ORDER — PROCHLORPERAZINE EDISYLATE 5 MG/ML
5 INJECTION INTRAMUSCULAR; INTRAVENOUS EVERY 6 HOURS PRN
Status: DISCONTINUED | OUTPATIENT
Start: 2025-03-14 | End: 2025-03-15 | Stop reason: HOSPADM

## 2025-03-14 RX ORDER — PROPOFOL 10 MG/ML
VIAL (ML) INTRAVENOUS
Status: DISCONTINUED | OUTPATIENT
Start: 2025-03-14 | End: 2025-03-14

## 2025-03-14 RX ORDER — MIDAZOLAM HYDROCHLORIDE 1 MG/ML
INJECTION INTRAMUSCULAR; INTRAVENOUS
Status: DISCONTINUED | OUTPATIENT
Start: 2025-03-14 | End: 2025-03-14

## 2025-03-14 RX ORDER — FLUTICASONE PROPIONATE 50 MCG
1 SPRAY, SUSPENSION (ML) NASAL DAILY
Status: DISCONTINUED | OUTPATIENT
Start: 2025-03-15 | End: 2025-03-15

## 2025-03-14 RX ORDER — AMOXICILLIN 250 MG
1 CAPSULE ORAL 2 TIMES DAILY PRN
Status: DISCONTINUED | OUTPATIENT
Start: 2025-03-14 | End: 2025-03-15 | Stop reason: HOSPADM

## 2025-03-14 RX ORDER — ONDANSETRON HYDROCHLORIDE 2 MG/ML
4 INJECTION, SOLUTION INTRAVENOUS EVERY 8 HOURS PRN
Status: DISCONTINUED | OUTPATIENT
Start: 2025-03-14 | End: 2025-03-15 | Stop reason: HOSPADM

## 2025-03-14 RX ORDER — ACETAMINOPHEN 325 MG/1
650 TABLET ORAL EVERY 4 HOURS PRN
Status: DISCONTINUED | OUTPATIENT
Start: 2025-03-14 | End: 2025-03-15 | Stop reason: HOSPADM

## 2025-03-14 RX ORDER — BENZONATATE 100 MG/1
200 CAPSULE ORAL 3 TIMES DAILY PRN
Status: DISCONTINUED | OUTPATIENT
Start: 2025-03-14 | End: 2025-03-15 | Stop reason: HOSPADM

## 2025-03-14 RX ORDER — ALUMINUM HYDROXIDE, MAGNESIUM HYDROXIDE, AND SIMETHICONE 1200; 120; 1200 MG/30ML; MG/30ML; MG/30ML
30 SUSPENSION ORAL 4 TIMES DAILY PRN
Status: DISCONTINUED | OUTPATIENT
Start: 2025-03-14 | End: 2025-03-15 | Stop reason: HOSPADM

## 2025-03-14 RX ORDER — NALOXONE HCL 0.4 MG/ML
0.02 VIAL (ML) INJECTION
Status: DISCONTINUED | OUTPATIENT
Start: 2025-03-14 | End: 2025-03-15 | Stop reason: HOSPADM

## 2025-03-14 RX ORDER — POLYETHYLENE GLYCOL 3350 17 G/17G
17 POWDER, FOR SOLUTION ORAL DAILY PRN
Status: DISCONTINUED | OUTPATIENT
Start: 2025-03-14 | End: 2025-03-15 | Stop reason: HOSPADM

## 2025-03-14 RX ORDER — FENTANYL CITRATE 50 UG/ML
INJECTION, SOLUTION INTRAMUSCULAR; INTRAVENOUS
Status: DISCONTINUED | OUTPATIENT
Start: 2025-03-14 | End: 2025-03-14

## 2025-03-14 RX ORDER — LIDOCAINE HYDROCHLORIDE 20 MG/ML
INJECTION, SOLUTION EPIDURAL; INFILTRATION; INTRACAUDAL; PERINEURAL
Status: DISCONTINUED | OUTPATIENT
Start: 2025-03-14 | End: 2025-03-14

## 2025-03-14 RX ORDER — FUROSEMIDE 10 MG/ML
40 INJECTION INTRAMUSCULAR; INTRAVENOUS ONCE
Status: DISCONTINUED | OUTPATIENT
Start: 2025-03-14 | End: 2025-03-14

## 2025-03-14 RX ORDER — SODIUM CHLORIDE 0.9 % (FLUSH) 0.9 %
10 SYRINGE (ML) INJECTION EVERY 6 HOURS PRN
Status: DISCONTINUED | OUTPATIENT
Start: 2025-03-14 | End: 2025-03-15 | Stop reason: HOSPADM

## 2025-03-14 RX ORDER — FUROSEMIDE 10 MG/ML
20 INJECTION INTRAMUSCULAR; INTRAVENOUS ONCE
Status: COMPLETED | OUTPATIENT
Start: 2025-03-14 | End: 2025-03-14

## 2025-03-14 RX ORDER — TALC
6 POWDER (GRAM) TOPICAL NIGHTLY PRN
Status: DISCONTINUED | OUTPATIENT
Start: 2025-03-14 | End: 2025-03-15 | Stop reason: HOSPADM

## 2025-03-14 RX ORDER — AMIODARONE HYDROCHLORIDE 200 MG/1
400 TABLET ORAL 2 TIMES DAILY
Status: DISCONTINUED | OUTPATIENT
Start: 2025-03-14 | End: 2025-03-15 | Stop reason: HOSPADM

## 2025-03-14 RX ORDER — METOPROLOL SUCCINATE 25 MG/1
25 TABLET, EXTENDED RELEASE ORAL DAILY
Status: DISCONTINUED | OUTPATIENT
Start: 2025-03-15 | End: 2025-03-15 | Stop reason: HOSPADM

## 2025-03-14 RX ORDER — LIDOCAINE HYDROCHLORIDE 20 MG/ML
SOLUTION OROPHARYNGEAL
Status: DISCONTINUED | OUTPATIENT
Start: 2025-03-14 | End: 2025-03-14

## 2025-03-14 RX ORDER — AZELASTINE 1 MG/ML
1 SPRAY, METERED NASAL 2 TIMES DAILY
Status: DISCONTINUED | OUTPATIENT
Start: 2025-03-14 | End: 2025-03-15 | Stop reason: HOSPADM

## 2025-03-14 RX ORDER — ALBUTEROL SULFATE 90 UG/1
2 INHALANT RESPIRATORY (INHALATION) EVERY 6 HOURS PRN
Status: DISCONTINUED | OUTPATIENT
Start: 2025-03-14 | End: 2025-03-15 | Stop reason: HOSPADM

## 2025-03-14 RX ORDER — ONDANSETRON HYDROCHLORIDE 2 MG/ML
INJECTION, SOLUTION INTRAVENOUS
Status: DISCONTINUED | OUTPATIENT
Start: 2025-03-14 | End: 2025-03-14

## 2025-03-14 RX ORDER — PHENYLEPHRINE HYDROCHLORIDE 10 MG/ML
INJECTION INTRAVENOUS
Status: DISCONTINUED | OUTPATIENT
Start: 2025-03-14 | End: 2025-03-14

## 2025-03-14 RX ADMIN — LIDOCAINE HYDROCHLORIDE 60 MG: 20 INJECTION, SOLUTION EPIDURAL; INFILTRATION; INTRACAUDAL; PERINEURAL at 04:03

## 2025-03-14 RX ADMIN — APIXABAN 5 MG: 5 TABLET, FILM COATED ORAL at 08:03

## 2025-03-14 RX ADMIN — MIDAZOLAM HYDROCHLORIDE 2 MG: 2 INJECTION, SOLUTION INTRAMUSCULAR; INTRAVENOUS at 03:03

## 2025-03-14 RX ADMIN — GUAIFENESIN AND DEXTROMETHORPHAN HYDROBROMIDE 1 TABLET: 600; 30 TABLET, EXTENDED RELEASE ORAL at 08:03

## 2025-03-14 RX ADMIN — ONDANSETRON 4 MG: 2 INJECTION INTRAMUSCULAR; INTRAVENOUS at 03:03

## 2025-03-14 RX ADMIN — LIDOCAINE HYDROCHLORIDE 15 ML: 20 SOLUTION ORAL at 03:03

## 2025-03-14 RX ADMIN — PERFLUTREN 1.5 ML: 6.52 INJECTION, SUSPENSION INTRAVENOUS at 04:03

## 2025-03-14 RX ADMIN — PROPOFOL 100 MG: 10 INJECTION, EMULSION INTRAVENOUS at 04:03

## 2025-03-14 RX ADMIN — AMIODARONE HYDROCHLORIDE 400 MG: 200 TABLET ORAL at 08:03

## 2025-03-14 RX ADMIN — FENTANYL CITRATE 25 MCG: 0.05 INJECTION, SOLUTION INTRAMUSCULAR; INTRAVENOUS at 03:03

## 2025-03-14 RX ADMIN — SODIUM CHLORIDE: 9 INJECTION, SOLUTION INTRAVENOUS at 03:03

## 2025-03-14 RX ADMIN — PROPOFOL 200 MCG/KG/MIN: 10 INJECTION, EMULSION INTRAVENOUS at 04:03

## 2025-03-14 RX ADMIN — PHENYLEPHRINE HYDROCHLORIDE 200 MCG: 10 INJECTION INTRAVENOUS at 04:03

## 2025-03-14 RX ADMIN — FUROSEMIDE 20 MG: 10 INJECTION, SOLUTION INTRAMUSCULAR; INTRAVENOUS at 04:03

## 2025-03-14 NOTE — HPI
45 year-old man with recently diagnosed atrial fibrillation who was admitted for acute decompensated heart failure after outpatient JOSEF cardioversion on 3/14. 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 Very physically active without limitations until ~1 month ago when he began to have volume overload symptoms.  Was seen by PCP and multiple urgent cares for her symptoms.  Imaging with edema and  infusions.  Treated for bronchitis and reactive airway disease.  Fortunately patient in his family ran into Dr. Appiah with EP at a softball game.  They told him about patient's symptoms.  He was found to have an irregular rhythm and followed up in clinic with Dr. Appiah.  EKG with atrial fibrillation.  He was then scheduled for an outpatient JOSEF cardioversion.  Successful cardioversion but found to have EF of 15-20% and a concern for acute decompensated heart failure so was admitted to hospital medicine for diuresis and further care

## 2025-03-14 NOTE — CONSULTS
[facility]  JOSEF Consult      Danilo Krause  YOB: 1980  Medical Record Number:  [id number]  Attending Physician:  CYDNEY Gerard MD   Date of Admission: 3/14/2025       Hospital Day:  0  Current Principal Problem:  New onset atrial fibrillation      History     Cc: palpitations    HPI  Danilo Krause is a 45 y.o. male with newly diagnosed atrial fibrillation with RVR    History obtained from previous visits as well as through patient report.    Background:     From last office visit with Dr. Appiah on 3/14/2025.     He is a pleasant 45 year-old man with no significant prior cardiac history. 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 Very physically active without limitations until ~1 month ago when he began to note progressive dyspnea on exertion, cough, fatigue and then orthopnea. Previously walked 3 miles a day and now limited to 1 mile. Went to urgent care on 3/2/2025. Noted to have bilateral middle ear effusions and pharyngeal erythema. Treated for bronchitis with steroid injection, anti-histamines and albuterol inhaler. Pulse rate at that visit was 101 bpm. Symptoms did not improve. He went back to an urgent care on 3/8/2025. Had a CXR suggestive of bilateral pulmonary edema. Pulse rate was 91 bpm. Given further sinus/bronchitis medications. Saw Dr. Gastelum on 3/11/2025 via telemedicine. There was concern for new onset heart failure. CT chest ordered and he was referred to Dr. Ortiz in cardiology (appointment scheduled Tuesday 3/18/2025). CT chest noted bilateral pleural effusions. He was rx augmentin as well. Notes he has felt somewhat better since starting augmentin. I ran into Mr Krause and his parents yesterday at the [employer] softball game and was asked to assess/speak with him. On my evaluation his pulse was irregular consistent with atrial fibrillation, rate was ~120-130 bpm. Digicompanion ECG tracing was also consistent with atrial fibrillation.  Discussed going to ER for more urgent evaluation (suspect persistent AF with development of rhythm related heart failure) however was feeling better and elected for urgent clinic visit this morning. Of note, his father and paternal uncle both have had atrial fibrillation with AF related heart failure. Both ablated by Dr. Perez. Recent labs reviewed (CBC, CMP, TSH) and all were normal.    Interval Hx:     Danilo Krause presents today to [facility] for scheduled JOSEF/DCCV with Dr. Gerard. He denies any chest pain, SOB, dizziness, light headedness, weakness, syncope, or near syncopal episodes. He endorses RODGERS. He felt palpitations this morning. He denies any bleeding, infections, fevers, rashes, or surgeries in the past 30 days. He took his first dose of Eliquis this AM.     ECG today shows AF with RVR at 152 bpm.    Pertinent labs reviewed from 2/21/2025 Hgb 14.4, Cr 0.9, TSH 1.431    Blood thinner: eliquis 5mg BID    Dysphagia or odynophagia:  no  Liver Disease, esophageal disease, or known varices:  no  Upper GI Bleeding: no  Prior neck surgery or radiation:  no  History of anesthetic difficulties:  no  Family history of anesthetic difficulties:  no  Last oral intake: last pm   Able to move neck in all directions: yes  Platelet count: 241  INR: 1.2  Last EF: None prior         Medications - Outpatient  Prior to Admission medications    Medication Sig Start Date End Date Taking? Authorizing Provider   albuterol (PROVENTIL/VENTOLIN HFA) 90 mcg/actuation inhaler Inhale 1 puff into the lungs every 6 (six) hours as needed. 3/2/25  Yes Provider, Historical   apixaban (ELIQUIS) 5 mg Tab Take 1 tablet (5 mg total) by mouth 2 (two) times daily. 3/14/25  Yes Michael Appiah MD   azelastine (ASTELIN) 137 mcg (0.1 %) nasal spray 1 spray (137 mcg total) by Nasal route 2 (two) times daily. 3/8/25 3/8/26 Yes Nannette Gaspar NP   azithromycin (Z-ARCADIO) 250 MG tablet Take 2 tablets by mouth on day 1; Take 1 tablet by mouth on days  2-5 3/12/25 3/17/25 Yes Sandy Gastelum MD   benzonatate (TESSALON) 100 MG capsule Take 2 capsules (200 mg total) by mouth 3 (three) times daily as needed. 3/8/25 3/18/25 Yes Nannette Gaspar NP   cetirizine (ZYRTEC) 10 MG tablet Take 1 tablet (10 mg total) by mouth once daily. 7/1/24  Yes Yoan Dean PA-C   fluticasone propionate (FLONASE) 50 mcg/actuation nasal spray 2 sprays (100 mcg total) by Each Nostril route once daily. 7/1/24  Yes Yoan Dean PA-C   fluticasone propionate (FLONASE) 50 mcg/actuation nasal spray 1 spray (50 mcg total) by Each Nostril route once daily. 3/8/25  Yes Nannette Gaspar NP   guaiFENesin (MUCINEX) 600 mg 12 hr tablet Take 2 tablets (1,200 mg total) by mouth 2 (two) times daily. for 10 days 3/8/25 3/18/25 Yes Nannette Gaspar NP   promethazine-dextromethorphan (PROMETHAZINE-DM) 6.25-15 mg/5 mL Syrp Take 5 mLs by mouth. 3/2/25  Yes Lesly Hernandez   cetirizine (ZYRTEC) 10 MG tablet Take 1 tablet (10 mg total) by mouth once daily. 3/8/25 4/7/25  Nannette Gaspar NP         Medications - Current  Scheduled Meds:  Continuous Infusions:  PRN Meds:.      Allergies  Review of patient's allergies indicates:  No Known Allergies      Past Medical History  Past Medical History:   Diagnosis Date    Anticoagulant long-term use     atrial fibrillation          Past Surgical History  History reviewed. No pertinent surgical history.      Social History  Social History     Socioeconomic History    Marital status:    Tobacco Use    Smoking status: Never    Smokeless tobacco: Never   Substance and Sexual Activity    Alcohol use: Yes     Comment: socially    Drug use: Never    Sexual activity: Yes     Partners: Female   Social History Narrative    ** Merged History Encounter **          Social Drivers of Health     Financial Resource Strain: Low Risk  (3/11/2025)    Overall Financial Resource Strain (CARDIA)     Difficulty of Paying Living Expenses: Not hard at  "all   Food Insecurity: No Food Insecurity (3/11/2025)    Hunger Vital Sign     Worried About Running Out of Food in the Last Year: Never true     Ran Out of Food in the Last Year: Never true   Transportation Needs: No Transportation Needs (3/11/2025)    PRAPARE - Transportation     Lack of Transportation (Medical): No     Lack of Transportation (Non-Medical): No   Physical Activity: Insufficiently Active (3/11/2025)    Exercise Vital Sign     Days of Exercise per Week: 3 days     Minutes of Exercise per Session: 30 min   Stress: No Stress Concern Present (3/11/2025)    [employer] - Occupational Stress Questionnaire     Feeling of Stress : Only a little   Housing Stability: Low Risk  (3/11/2025)    Housing Stability Vital Sign     Unable to Pay for Housing in the Last Year: No     Number of Times Moved in the Last Year: 0     Homeless in the Last Year: No         ROS  10 point ROS performed and negative except as stated in HPI     Physical Examination         Vital Signs  Vitals  Temp: 97.9 °F (36.6 °C)  Temp Source: Temporal  Pulse: (!) 147  Heart Rate Source: Monitor  Resp: 18  SpO2: 96 %  BP: (!) 130/100  MAP (mmHg): 111  BP Location: Left arm  BP Method: Automatic  Patient Position: Sitting          24 Hour VS Range    Temp:  [97.9 °F (36.6 °C)]   Pulse:  [147-164]   Resp:  [18]   BP: (112-136)/()   SpO2:  [96 %]   No intake or output data in the 24 hours ending 03/14/25 1415      Physical Exam:   Constitutional: no acute distress  HEENT: NCAT, EOMI, no scleral icterus  Cardiovascular: tachycardic rate and irregular rhythm  Pulmonary: Normal respiratory effort   Abdomen: nontender, non-distended   Neuro: alert and oriented, no focal deficits  Extremities: warm, no edema   MSK: no deformities  Integument: intact, no rashes         Data       No results for input(s): "WBC", "HGB", "HCT", "PLT" in the last 168 hours.     Recent Labs   Lab 03/14/25  1232   INR 1.2        No results " "for input(s): "NA", "K", "CL", "CO2", "BUN", "CREATININE", "ANIONGAP", "CALCIUM" in the last 168 hours.     No results for input(s): "PROT", "ALBUMIN", "BILITOT", "ALKPHOS", "AST", "ALT" in the last 168 hours.     No results for input(s): "TROPONINI" in the last 168 hours.     No results found for: "BNP"    No results for input(s): "LABBLOO" in the last 168 hours.         Assessment & Plan     Atrial fibrillation/flutter  -No absolute contraindications of esophageal stricture, tumor, perforation, laceration,or diverticulum and/or active GI bleed  -The risks, benefits & alternatives of the procedure were explained to the patient.   -The risks of transesophageal echo include but are not limited to:  Dental trauma, esophageal trauma/perforation, bleeding, laryngospasm/brochospasm, aspiration, sore throat/hoarseness, & dislodgement of the endotracheal tube/nasogastric tube (where applicable).    -The risks of moderate sedation include hypotension, respiratory depression, arrhythmias, bronchospasm, & death.    -Informed consent was obtained. The patient is agreeable to proceed with the procedure and all questions and concerns addressed    jJ Maria MD  [facility]   Cardiovascular Disease Fellow    "

## 2025-03-14 NOTE — H&P
[facility] - Short Stay Cardiac Unit  Cardiology  History and Physical     Patient Name: Danilo Krause  MRN: [id number]  Admission Date: 3/14/2025  Code Status: No Order   Attending Provider: CYDNEY Gerard MD   Primary Care Physician: Sandy Gastelum MD  Principal Problem:New onset atrial fibrillation    Patient information was obtained from patient and past medical records.     Subjective:     Chief Complaint:  New onset atrial fibrillation with RVR    HPI:  Danilo Krause is a 45 y.o. male with newly diagnosed atrial fibrillation with RVR    History obtained from previous visits as well as through patient report.    Background:     From last office visit with Dr. Appiah on 3/14/2025.     He is a pleasant 45 year-old man with no significant prior cardiac history. 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 Previously walked 3 miles a day and now limited to 1 mile. Went to urgent care on 3/2/2025. Noted to have bilateral middle ear effusions and pharyngeal erythema. Treated for bronchitis with steroid injection, anti-histamines and albuterol inhaler. Pulse rate at that visit was 101 bpm. Symptoms did not improve. He went back to an urgent care on 3/8/2025. Had a CXR suggestive of bilateral pulmonary edema. Pulse rate was 91 bpm. Given further sinus/bronchitis medications. Saw Dr. Gastelum on 3/11/2025 via telemedicine. There was concern for new onset heart failure. CT chest ordered and he was referred to Dr. Ortiz in cardiology (appointment scheduled Tuesday 3/18/2025). CT chest noted bilateral pleural effusions. He was rx augmentin as well. Notes he has felt somewhat better since starting augmentin. I ran into Mr Krause and his parents yesterday at the [employer] softball game and was asked to assess/speak with him. On my evaluation his pulse was irregular consistent  with atrial fibrillation, rate was ~120-130 bpm. Kardia mobile ECG tracing was also consistent with atrial fibrillation. Discussed going to ER for more urgent evaluation (suspect persistent AF with development of rhythm related heart failure) however was feeling better and elected for urgent clinic visit this morning. Of note, his father and paternal uncle both have had atrial fibrillation with AF related heart failure. Both ablated by Dr. Perez. Recent labs reviewed (CBC, CMP, TSH) and all were normal.     Today's in-clinic ECG is AF with RVR (ventricular rate 160s).     In summary, Mr Krause is a pleasant 45 year-old man with no significant prior cardiac history presenting for new onset atrial fibrillation, suspect persistent for 1 month, and symptoms of congestive heart failure. I had a long discussion with the patient about the pathophysiology and risks of atrial fibrillation and its basic pathophysiology, including its health implications and treatment options. Specifically, I addressed the need for CVA (stroke) prophylaxis with aspirin versus oral anticoagulation (warfarin vs DOACs, discussed bleeding risks, and need to come to the ER for any head trauma for CT scanning even if asymptomatic). [id number] score is 1 (HF symptoms, pending ECHO) and long-term oral anticoagulation is reasonable however short term for rhythm control is certainly indicated. I also discussed the goal to reduce symptomatic arrhythmic episodes by pharmacologic and/or procedural methods and utilizing a rhythm versus a rate control strategy. Strongly recommend urgent JOSEF/DCCV. Will schedule for this afternoon at [facility]. Anticipate likelihood of very low EF and admission to obs/medicine after for diuresis. If EF significantly reduced would start amiodarone after. Discussed this would just be temporary to allow EF to recover. Long-term plan would certainly entail ablation. Recommend keeping appointment with Dr. Ortiz next week for  further heart failure management.     Interval Hx:     Danilo Krause presents today to [facility] for scheduled JOSEF/DCCV with Dr. Gerard. He denies any chest pain, SOB, dizziness, light headedness, weakness, syncope, or near syncopal episodes. He endorses RODGERS. He felt palpitations this morning. He denies any bleeding, infections, fevers, rashes, or surgeries in the past 30 days. He took his first dose of Eliquis this AM.     ECG today shows AF with RVR at 152 bpm.    Pertinent labs reviewed from 2/21/2025 Hgb 14.4, Cr 0.9, TSH 1.431    Past Medical History:   Diagnosis Date    Anticoagulant long-term use     atrial fibrillation        History reviewed. No pertinent surgical history.    Review of patient's allergies indicates:  No Known Allergies    No current facility-administered medications on file prior to encounter.     Current Outpatient Medications on File Prior to Encounter   Medication Sig    albuterol (PROVENTIL/VENTOLIN HFA) 90 mcg/actuation inhaler Inhale 1 puff into the lungs every 6 (six) hours as needed.    apixaban (ELIQUIS) 5 mg Tab Take 1 tablet (5 mg total) by mouth 2 (two) times daily.    azelastine (ASTELIN) 137 mcg (0.1 %) nasal spray 1 spray (137 mcg total) by Nasal route 2 (two) times daily.    azithromycin (Z-ARCADIO) 250 MG tablet Take 2 tablets by mouth on day 1; Take 1 tablet by mouth on days 2-5    benzonatate (TESSALON) 100 MG capsule Take 2 capsules (200 mg total) by mouth 3 (three) times daily as needed.    cetirizine (ZYRTEC) 10 MG tablet Take 1 tablet (10 mg total) by mouth once daily.    fluticasone propionate (FLONASE) 50 mcg/actuation nasal spray 2 sprays (100 mcg total) by Each Nostril route once daily.    fluticasone propionate (FLONASE) 50 mcg/actuation nasal spray 1 spray (50 mcg total) by Each Nostril route once daily.    guaiFENesin (MUCINEX) 600 mg 12 hr tablet Take 2 tablets (1,200 mg total) by mouth 2 (two) times daily. for 10 days    promethazine-dextromethorphan  "(PROMETHAZINE-DM) 6.25-15 mg/5 mL Syrp Take 5 mLs by mouth.    cetirizine (ZYRTEC) 10 MG tablet Take 1 tablet (10 mg total) by mouth once daily.     Family History       Problem Relation (Age of Onset)    Atrial fibrillation Father    Hypertension Father          Tobacco Use    Smoking status: Never    Smokeless tobacco: Never   Substance and Sexual Activity    Alcohol use: Yes     Comment: socially    Drug use: Never    Sexual activity: Yes     Partners: Female     Review of Systems   Constitutional: Positive for malaise/fatigue. Negative for chills and fever.   HENT:  Negative for nosebleeds.    Eyes: Negative.    Cardiovascular:  Positive for dyspnea on exertion and palpitations. Negative for chest pain and syncope.   Respiratory:  Negative for shortness of breath.    Hematologic/Lymphatic: Negative.    Skin: Negative.    Gastrointestinal:  Positive for bloating. Negative for abdominal pain and nausea.   Genitourinary:  Negative for hematuria.   Neurological:  Negative for dizziness and light-headedness.   Psychiatric/Behavioral:  Negative for altered mental status.    All other systems reviewed and are negative.    Objective:     Vital Signs (Most Recent):     Vitals:    03/14/25 1332 03/14/25 1334   BP: 136/88 (!) 130/100   BP Location: Right arm Left arm   Patient Position: Sitting Sitting   Pulse: (!) 147    Resp: 18    Temp: 97.9 °F (36.6 °C)    TempSrc: Temporal    SpO2: 96%    Weight: 129.3 kg (285 lb)    Height: 5' 7" (1.702 m)       Vital Signs (24h Range):  Pulse:  [164] 164  BP: (112)/(90) 112/90        Physical Exam  Vitals and nursing note reviewed.   Constitutional:       Appearance: Normal appearance.   HENT:      Head: Normocephalic.      Nose: Nose normal.   Eyes:      Extraocular Movements: Extraocular movements intact.   Cardiovascular:      Rate and Rhythm: Tachycardia present. Rhythm irregular.   Pulmonary:      Effort: Pulmonary effort is normal. No respiratory distress.      Breath sounds: " No wheezing.   Abdominal:      Palpations: Abdomen is soft.   Musculoskeletal:         General: Normal range of motion.      Cervical back: Normal range of motion.   Skin:     General: Skin is warm.   Neurological:      General: No focal deficit present.      Mental Status: He is alert.   Psychiatric:         Mood and Affect: Mood normal.     Significant Labs: Pertinent pre-procedure labs reviewed     Significant Imaging: ECG  Assessment and Plan:     New onset atrial fibrillation with RVR    Plan:   -JOSEF/DCCV  -Cardiology consulted for JOSEF   -Anesthesia for sedation     Prior to procedure, extensive discussion with patient regarding risks and benefits of DCCV with Dr. Appiah in clinic and with Dr. Gerard at bedside today. The patient voices understanding, all questions have been answered, and patient would like to proceed. No further questions/concerns voiced at this time. Consents signed.     RON Downing-GARY  Cardiology   [facility] - Short Stay Cardiac Unit    Attending: Dr. Gladys Gerard

## 2025-03-14 NOTE — PROGRESS NOTES
Subjective   Patient ID:  Danilo Krause is a 45 y.o. male who presents for evaluation of Atrial Fibrillation and Congestive Heart Failure    Primary Care Physician: Sandy Gastelum MD    HPI  I had the pleasure of seeing Mr Krause today in our electrophysiology clinic in consultation for his atrial arrhythmia and shortness of breath. As you are aware he is a pleasant 45 year-old man with no significant prior cardiac history. Runs a lawn/gardening business. Very physically active without limitations until ~1 month ago when he began to note progressive dyspnea on exertion, cough, fatigue and then orthopnea. Previously walked 3 miles a day and now limited to 1 mile. Went to urgent care on 3/2/2025. Noted to have bilateral middle ear effusions and pharyngeal erythema. Treated for bronchitis with steroid injection, anti-histamines and albuterol inhaler. Pulse rate at that visit was 101 bpm. Symptoms did not improve. He went back to an urgent care on 3/8/2025. Had a CXR suggestive of bilateral pulmonary edema. Pulse rate was 91 bpm. Given further sinus/bronchitis medications. Saw Dr. Gastelum on 3/11/2025 via telemedicine. There was concern for new onset heart failure. CT chest ordered and he was referred to Dr. Ortiz in cardiology (appointment scheduled Tuesday 3/18/2025). CT chest noted bilateral pleural effusions. He was rx augmentin as well. Notes he has felt somewhat better since starting augmentin. I ran into Mr Krause and his parents yesterday at the [employer] softball game and was asked to assess/speak with him. On my evaluation his pulse was irregular consistent with atrial fibrillation, rate was ~120-130 bpm. Kardia mobile ECG tracing was also consistent with atrial fibrillation. Discussed going to ER for more urgent evaluation (suspect persistent AF with development of rhythm related heart failure) however was feeling better and elected for urgent clinic visit this morning. Of note, his father and paternal  uncle both have had atrial fibrillation with AF related heart failure. Both ablated by Dr. Perez. Recent labs reviewed (CBC, CMP, TSH) and all were normal.    My interpretation of today's in-clinic ECG is AF with RVR (ventricular rate 160s)    Review of Systems   Constitutional: Positive for malaise/fatigue. Negative for fever.   HENT:  Negative for congestion and sore throat.    Eyes:  Negative for blurred vision and visual disturbance.   Cardiovascular:  Positive for dyspnea on exertion and orthopnea. Negative for chest pain, irregular heartbeat, near-syncope, palpitations and syncope.   Respiratory:  Positive for cough. Negative for shortness of breath.    Hematologic/Lymphatic: Negative for bleeding problem. Does not bruise/bleed easily.   Skin: Negative.    Musculoskeletal: Negative.    Gastrointestinal:  Negative for bloating, abdominal pain, hematochezia and melena.   Neurological:  Negative for focal weakness and weakness.   Psychiatric/Behavioral: Negative.            Objective     Physical Exam  Vitals reviewed.   Constitutional:       General: He is not in acute distress.     Appearance: He is well-developed. He is not diaphoretic.   HENT:      Head: Normocephalic and atraumatic.   Eyes:      General:         Right eye: No discharge.         Left eye: No discharge.      Conjunctiva/sclera: Conjunctivae normal.   Cardiovascular:      Rate and Rhythm: Tachycardia present. Rhythm irregularly irregular.      Heart sounds: No murmur heard.     No friction rub. No gallop.   Pulmonary:      Effort: Pulmonary effort is normal. No respiratory distress.      Breath sounds: Rales present. No wheezing.   Abdominal:      General: Bowel sounds are normal. There is no distension.      Palpations: Abdomen is soft.      Tenderness: There is no abdominal tenderness.   Musculoskeletal:      Cervical back: Neck supple.   Skin:     General: Skin is warm and dry.   Neurological:      Mental Status: He is alert and oriented to  person, place, and time.   Psychiatric:         Behavior: Behavior normal.         Thought Content: Thought content normal.         Judgment: Judgment normal.            Assessment and Plan     1. New onset atrial fibrillation        Plan:  In summary, Mr Krause is a pleasant 45 year-old man with no significant prior cardiac history presenting for new onset atrial fibrillation, suspect persistent for 1 month, and symptoms of congestive heart failure. I had a long discussion with the patient about the pathophysiology and risks of atrial fibrillation and its basic pathophysiology, including its health implications and treatment options. Specifically, I addressed the need for CVA (stroke) prophylaxis with aspirin versus oral anticoagulation (warfarin vs DOACs, discussed bleeding risks, and need to come to the ER for any head trauma for CT scanning even if asymptomatic). [id number] score is 1 (HF symptoms, pending ECHO) and long-term oral anticoagulation is reasonable however short term for rhythm control is certainly indicated. I also discussed the goal to reduce symptomatic arrhythmic episodes by pharmacologic and/or procedural methods and utilizing a rhythm versus a rate control strategy. Strongly recommend urgent JOSEF/DCCV. Will schedule for this afternoon at [facility]. Anticipate likelihood of very low EF and admission to obs/medicine after for diuresis. If EF significantly reduced would start amiodarone after. Discussed this would just be temporary to allow EF to recover. Long-term plan would certainly entail ablation. Recommend keeping appointment with Dr. Ortiz next week for further heart failure management.     Thank you for allowing me to participate in the care of this patient. Please do not hesitate to call me with any questions or concerns.    Michael Appiah MD, PhD  Cardiac Electrophysiology

## 2025-03-14 NOTE — ANESTHESIA PREPROCEDURE EVALUATION
"                                                                                                             03/14/2025  Danilo Krause is a 45 y.o., male.  Pre-operative evaluation for Procedure(s) (LRB):  Cardioversion or Defibrillation (N/A)  Transesophageal echo (JOSEF) intra-procedure log documentation (N/A)    Danilo Krause is a 45 y.o. male   NPO since 0630 ([facility])- will proceed 1430 or later    Patient Active Problem List   Diagnosis    Morbid obesity with BMI of 40.0-44.9, adult    Sensorineural hearing loss (SNHL) of right ear with unrestricted hearing of left ear    Sudden hearing loss, right    New onset atrial fibrillation       History reviewed. No pertinent surgical history.    Social History[1]    Medications Ordered Prior to Encounter[2]    Review of patient's allergies indicates:  No Known Allergies      CBC: No results for input(s): "WBC", "RBC", "HGB", "HCT", "PLT", "MCV", "MCH", "MCHC" in the last 72 hours.    CMP: No results for input(s): "NA", "K", "CL", "CO2", "BUN", "CREATININE", "GLU", "MG", "PHOS", "CALCIUM", "ALBUMIN", "PROT", "ALKPHOS", "ALT", "AST", "BILITOT" in the last 72 hours.    INR  Recent Labs     03/14/25  1232   INR 1.2         Diagnostic Studies:    EKG:   No results found for this or any previous visit.    TTE:  No results found for this or any previous visit.  No results found for: "EF"   No results found for this or any previous visit.    JOSEF:  No results found for this or any previous visit.    Stress Test:  No results found for this or any previous visit.       LHC:  No results found for this or any previous visit.       PFT:  No results found for: "FEV1", "FVC", "VHY7AVB", "TLC", "DLCO"     ALLERGIES:   Review of patient's allergies indicates:  No Known Allergies  LDA:          Lines/Drains/Airways       Peripheral Intravenous Line  Duration                  Peripheral IV - Single Lumen 03/14/25 1348 20 G Left;Posterior Hand <1 day                   " Anesthesia Evaluation      Airway   Mallampati: II  TM distance: > 6 cm  Neck ROM: Normal ROM  Dental    (+) Intact    Pulmonary    Cardiovascular   (+) dysrhythmias    Rate: Normal    Neuro/Psych      GI/Hepatic/Renal      Endo/Other    Abdominal                           Pre-op Assessment    I have reviewed the Patient Summary Reports.     I have reviewed the Nursing Notes. I have reviewed the NPO Status.   I have reviewed the Medications.     Review of Systems  Anesthesia Hx:  No problems with previous Anesthesia             Denies Family Hx of Anesthesia complications.    Denies Personal Hx of Anesthesia complications.                    Cardiovascular:         Dysrhythmias atrial fibrillation                                     Pulmonary:  Pulmonary Normal                       Renal/:  Renal/ Normal                 Hepatic/GI:  Hepatic/GI Normal                    Neurological:  Neurology Normal                                      Endocrine:  Endocrine Normal                Physical Exam  General: Well nourished    Airway:  Mallampati: II   Mouth Opening: Normal  TM Distance: > 6 cm  Tongue: Normal  Neck ROM: Normal ROM    Dental:  Intact    Chest/Lungs:  Normal Respiratory Rate    Heart:  Rate: Normal        Anesthesia Plan  Type of Anesthesia, risks & benefits discussed:    Anesthesia Type: Gen Natural Airway, MAC  Intra-op Monitoring Plan: Standard ASA Monitors  Post Op Pain Control Plan: multimodal analgesia and IV/PO Opioids PRN  Induction:  IV  Airway Plan: Direct, Post-Induction  Informed Consent: Informed consent signed with the Patient and all parties understand the risks and agree with anesthesia plan.  All questions answered. Patient consented to blood products? Yes  ASA Score: 3  Day of Surgery Review of History & Physical: H&P Update referred to the surgeon/provider.    Ready For Surgery From Anesthesia Perspective.     .           [1]   Social History  Socioeconomic History    Marital  status:    Tobacco Use    Smoking status: Never    Smokeless tobacco: Never   Substance and Sexual Activity    Alcohol use: Yes     Comment: socially    Drug use: Never    Sexual activity: Yes     Partners: Female   Social History Narrative    ** Merged History Encounter **          Social Drivers of Health     Financial Resource Strain: Low Risk  (3/11/2025)    Overall Financial Resource Strain (CARDIA)     Difficulty of Paying Living Expenses: Not hard at all   Food Insecurity: No Food Insecurity (3/11/2025)    Hunger Vital Sign     Worried About Running Out of Food in the Last Year: Never true     Ran Out of Food in the Last Year: Never true   Transportation Needs: No Transportation Needs (3/11/2025)    PRAPARE - Transportation     Lack of Transportation (Medical): No     Lack of Transportation (Non-Medical): No   Physical Activity: Insufficiently Active (3/11/2025)    Exercise Vital Sign     Days of Exercise per Week: 3 days     Minutes of Exercise per Session: 30 min   Stress: No Stress Concern Present (3/11/2025)    [employer] - Occupational Stress Questionnaire     Feeling of Stress : Only a little   Housing Stability: Low Risk  (3/11/2025)    Housing Stability Vital Sign     Unable to Pay for Housing in the Last Year: No     Number of Times Moved in the Last Year: 0     Homeless in the Last Year: No   [2]   No current facility-administered medications on file prior to encounter.     Current Outpatient Medications on File Prior to Encounter   Medication Sig Dispense Refill    albuterol (PROVENTIL/VENTOLIN HFA) 90 mcg/actuation inhaler Inhale 1 puff into the lungs every 6 (six) hours as needed.      apixaban (ELIQUIS) 5 mg Tab Take 1 tablet (5 mg total) by mouth 2 (two) times daily. 60 tablet 11    azelastine (ASTELIN) 137 mcg (0.1 %) nasal spray 1 spray (137 mcg total) by Nasal route 2 (two) times daily. 30 mL 0    azithromycin (Z-ARCADIO) 250 MG tablet Take 2 tablets by mouth  on day 1; Take 1 tablet by mouth on days 2-5 6 tablet 0    benzonatate (TESSALON) 100 MG capsule Take 2 capsules (200 mg total) by mouth 3 (three) times daily as needed. 60 capsule 0    cetirizine (ZYRTEC) 10 MG tablet Take 1 tablet (10 mg total) by mouth once daily. 30 tablet 0    fluticasone propionate (FLONASE) 50 mcg/actuation nasal spray 2 sprays (100 mcg total) by Each Nostril route once daily. 16 each 0    fluticasone propionate (FLONASE) 50 mcg/actuation nasal spray 1 spray (50 mcg total) by Each Nostril route once daily. 16 g 0    guaiFENesin (MUCINEX) 600 mg 12 hr tablet Take 2 tablets (1,200 mg total) by mouth 2 (two) times daily. for 10 days 40 tablet 0    promethazine-dextromethorphan (PROMETHAZINE-DM) 6.25-15 mg/5 mL Syrp Take 5 mLs by mouth.      cetirizine (ZYRTEC) 10 MG tablet Take 1 tablet (10 mg total) by mouth once daily. 30 tablet 0

## 2025-03-14 NOTE — TRANSFER OF CARE
"Anesthesia Transfer of Care Note    Patient: Danilo Krause    Procedure(s) Performed: Procedure(s) (LRB):  Cardioversion or Defibrillation (N/A)  Transesophageal echo (JOSEF) intra-procedure log documentation (N/A)    Patient location: PACU    Anesthesia Type: general    Transport from OR: Transported from OR on 2-3 L/min O2 by NC with adequate spontaneous ventilation    Post pain: adequate analgesia    Post assessment: no apparent anesthetic complications and tolerated procedure well    Post vital signs: stable    Level of consciousness: sedated    Nausea/Vomiting: no nausea/vomiting    Complications: none    Transfer of care protocol was followed    Last vitals: Visit Vitals  BP (!) 130/100 (BP Location: Left arm, Patient Position: Sitting)   Pulse (!) 147   Temp 36.6 °C (97.9 °F) (Temporal)   Resp 18   Ht 5' 7" (1.702 m)   Wt 129.3 kg (285 lb)   SpO2 96%   BMI 44.64 kg/m²     "

## 2025-03-14 NOTE — CARE UPDATE
EP Plan of Care Update    Attending: Dr. Michael Appiah     Patient is a 45 year old male with no significant medical history. ECG today confirmed Atrial fibrillation with RVR in clinic with Dr. Appiah. He presented to [facility] today for JOSEF/DCCV. JOSEF today shows EF estimated at 20%. Case discussed with Hospitalist team who agreed to admit.     Telemetry shows he is currently in SR. Post DCCV ECG pending.     EP recommendations:   -Start amiodarone 400 mg BID x 14 days, then decrease to 200 mg daily.   -Metoprolol succinate 25 mg daily starting tomorrow if HR and BP allows.   -Continue Eliquis 5 mg BID. (He took first dose this AM),  -Will give Lasix 40 mg IVP now.   -Cardiology consult to assist with IV diuresis and HF    Sign off also given to EP fellow on call. Please call with any problems.       Lynnette Ray, MAURA, FNP-C  Cardiology Electrophysiology NP   [facility]

## 2025-03-14 NOTE — PLAN OF CARE
Received pt to SSCU from home accompanied by family.  AAO x 4. Denies pain or discomfort. Respirations even and unlabored. No distress noted. Pt stable.  Admit assessment complete. IV x 1 placed.  Pt oriented to room and call bell placed within reach.

## 2025-03-14 NOTE — SUBJECTIVE & OBJECTIVE
Past Medical History:   Diagnosis Date    Anticoagulant long-term use     atrial fibrillation        History reviewed. No pertinent surgical history.    Review of patient's allergies indicates:  No Known Allergies    No current facility-administered medications on file prior to encounter.     Current Outpatient Medications on File Prior to Encounter   Medication Sig    albuterol (PROVENTIL/VENTOLIN HFA) 90 mcg/actuation inhaler Inhale 1 puff into the lungs every 6 (six) hours as needed.    apixaban (ELIQUIS) 5 mg Tab Take 1 tablet (5 mg total) by mouth 2 (two) times daily.    azelastine (ASTELIN) 137 mcg (0.1 %) nasal spray 1 spray (137 mcg total) by Nasal route 2 (two) times daily.    azithromycin (Z-ARCADIO) 250 MG tablet Take 2 tablets by mouth on day 1; Take 1 tablet by mouth on days 2-5    benzonatate (TESSALON) 100 MG capsule Take 2 capsules (200 mg total) by mouth 3 (three) times daily as needed.    cetirizine (ZYRTEC) 10 MG tablet Take 1 tablet (10 mg total) by mouth once daily.    fluticasone propionate (FLONASE) 50 mcg/actuation nasal spray 2 sprays (100 mcg total) by Each Nostril route once daily.    fluticasone propionate (FLONASE) 50 mcg/actuation nasal spray 1 spray (50 mcg total) by Each Nostril route once daily.    guaiFENesin (MUCINEX) 600 mg 12 hr tablet Take 2 tablets (1,200 mg total) by mouth 2 (two) times daily. for 10 days    promethazine-dextromethorphan (PROMETHAZINE-DM) 6.25-15 mg/5 mL Syrp Take 5 mLs by mouth.    cetirizine (ZYRTEC) 10 MG tablet Take 1 tablet (10 mg total) by mouth once daily.     Family History       Problem Relation (Age of Onset)    Atrial fibrillation Father    Hypertension Father          Tobacco Use    Smoking status: Never    Smokeless tobacco: Never   Substance and Sexual Activity    Alcohol use: Yes     Comment: socially    Drug use: Never    Sexual activity: Yes     Partners: Female     Review of Systems   Constitutional: Positive for malaise/fatigue. Negative for  chills and fever.   HENT:  Negative for nosebleeds.    Eyes: Negative.    Cardiovascular:  Positive for dyspnea on exertion and palpitations. Negative for chest pain and syncope.   Respiratory:  Negative for shortness of breath.    Hematologic/Lymphatic: Negative.    Skin: Negative.    Gastrointestinal:  Positive for bloating. Negative for abdominal pain and nausea.   Genitourinary:  Negative for hematuria.   Neurological:  Negative for dizziness and light-headedness.   Psychiatric/Behavioral:  Negative for altered mental status.    All other systems reviewed and are negative.    Objective:     Vital Signs (Most Recent):    Vital Signs (24h Range):  Pulse:  [164] 164  BP: (112)/(90) 112/90        There is no height or weight on file to calculate BMI.            No intake or output data in the 24 hours ending 03/14/25 1328    Lines/Drains/Airways       None                    Physical Exam  Vitals and nursing note reviewed.   Constitutional:       Appearance: Normal appearance.   HENT:      Head: Normocephalic.      Nose: Nose normal.   Eyes:      Extraocular Movements: Extraocular movements intact.   Cardiovascular:      Rate and Rhythm: Tachycardia present. Rhythm irregular.   Pulmonary:      Effort: Pulmonary effort is normal. No respiratory distress.      Breath sounds: No wheezing.   Abdominal:      Palpations: Abdomen is soft.   Musculoskeletal:         General: Normal range of motion.      Cervical back: Normal range of motion.   Skin:     General: Skin is warm.   Neurological:      General: No focal deficit present.      Mental Status: He is alert.   Psychiatric:         Mood and Affect: Mood normal.          Significant Labs: Pertinent pre-procedure labs reviewed     Significant Imaging: ECG

## 2025-03-14 NOTE — NURSING TRANSFER
Nursing Transfer Note      3/14/2025   5:43 PM    Nurse giving handoff:Susie  Nurse receiving handoff:Viviana    Reason patient is being transferred: post procedure    Transfer To: U [id number]    Transfer via stretcher    Transfer with cardiac monitoring    Transported by RN      Telemetry: Box Number [id number], Rate 90, and Rhythm NSR  Order for Tele Monitor? Yes      Any special needs or follow-up needed: routine    Patient belongings transferred with patient: No    Chart send with patient: Yes    Notified: spouse    Patient reassessed at: 1715 @ 03/14/2025

## 2025-03-15 VITALS
SYSTOLIC BLOOD PRESSURE: 128 MMHG | OXYGEN SATURATION: 97 % | TEMPERATURE: 98 F | HEIGHT: 67 IN | RESPIRATION RATE: 18 BRPM | HEART RATE: 85 BPM | BODY MASS INDEX: 44.73 KG/M2 | DIASTOLIC BLOOD PRESSURE: 91 MMHG | WEIGHT: 285 LBS

## 2025-03-15 PROBLEM — R06.09 DYSPNEA ON EXERTION: Status: ACTIVE | Noted: 2025-03-15

## 2025-03-15 PROBLEM — R17 ELEVATED BILIRUBIN: Status: ACTIVE | Noted: 2025-03-15

## 2025-03-15 PROBLEM — E66.01 SEVERE OBESITY (BMI >= 40): Chronic | Status: ACTIVE | Noted: 2020-08-22

## 2025-03-15 LAB
ALBUMIN SERPL BCP-MCNC: 3.3 G/DL (ref 3.5–5.2)
ALP SERPL-CCNC: 56 U/L (ref 40–150)
ALT SERPL W/O P-5'-P-CCNC: 40 U/L (ref 10–44)
ANION GAP SERPL CALC-SCNC: 9 MMOL/L (ref 8–16)
ASCENDING AORTA: 3.15 CM
AST SERPL-CCNC: 26 U/L (ref 10–40)
AV AREA BY CONTINUOUS VTI: 3.6 CM2
AV INDEX (PROSTH): 0.76
AV LVOT MEAN GRADIENT: 1 MMHG
AV LVOT PEAK GRADIENT: 2 MMHG
AV MEAN GRADIENT: 2 MMHG
AV PEAK GRADIENT: 3 MMHG
AV VALVE AREA BY VELOCITY RATIO: 3.8 CM²
AV VALVE AREA: 3.7 CM2
AV VELOCITY RATIO: 0.78
BILIRUB DIRECT SERPL-MCNC: 0.6 MG/DL (ref 0.1–0.3)
BILIRUB SERPL-MCNC: 1.5 MG/DL (ref 0.1–1)
BSA FOR ECHO PROCEDURE: 2.47 M2
BUN SERPL-MCNC: 14 MG/DL (ref 6–20)
CALCIUM SERPL-MCNC: 8.3 MG/DL (ref 8.7–10.5)
CHLORIDE SERPL-SCNC: 109 MMOL/L (ref 95–110)
CHOLEST SERPL-MCNC: 122 MG/DL (ref 120–199)
CHOLEST/HDLC SERPL: 4.4 {RATIO} (ref 2–5)
CO2 SERPL-SCNC: 23 MMOL/L (ref 23–29)
CREAT SERPL-MCNC: 0.8 MG/DL (ref 0.5–1.4)
CV ECHO LV RWT: 0.25 CM
DOP CALC AO PEAK VEL: 0.9 M/S
DOP CALC AO VTI: 14.6 CM
DOP CALC LVOT AREA: 4.9 CM2
DOP CALC LVOT DIAMETER: 2.5 CM
DOP CALC LVOT PEAK VEL: 0.7 M/S
DOP CALC LVOT STROKE VOLUME: 54.5 CM3
DOP CALCLVOT PEAK VEL VTI: 11.1 CM
E WAVE DECELERATION TIME: 74 MS
E/A RATIO: 2.76
E/E' RATIO: 11 M/S
ECHO EF ESTIMATED: 25 %
ECHO LV POSTERIOR WALL: 0.7 CM (ref 0.6–1.1)
EST. GFR  (NO RACE VARIABLE): >60 ML/MIN/1.73 M^2
ESTIMATED AVG GLUCOSE: 97 MG/DL (ref 68–131)
FRACTIONAL SHORTENING: 12.3 % (ref 28–44)
GLUCOSE SERPL-MCNC: 79 MG/DL (ref 70–110)
HBA1C MFR BLD: 5 % (ref 4–5.6)
HDLC SERPL-MCNC: 28 MG/DL (ref 40–75)
HDLC SERPL: 23 % (ref 20–50)
INTERVENTRICULAR SEPTUM: 0.7 CM (ref 0.6–1.1)
LA MAJOR: 6.1 CM
LA MINOR: 6.1 CM
LA WIDTH: 3.6 CM
LDLC SERPL CALC-MCNC: 83.2 MG/DL (ref 63–159)
LEFT ATRIUM SIZE: 5.1 CM
LEFT ATRIUM VOLUME INDEX MOD: 37 ML/M2
LEFT ATRIUM VOLUME INDEX: 41 ML/M2
LEFT ATRIUM VOLUME MOD: 86 ML
LEFT ATRIUM VOLUME: 95 CM3
LEFT INTERNAL DIMENSION IN SYSTOLE: 5 CM (ref 2.1–4)
LEFT VENTRICLE DIASTOLIC VOLUME INDEX: 66.81 ML/M2
LEFT VENTRICLE DIASTOLIC VOLUME: 157 ML
LEFT VENTRICLE MASS INDEX: 61.4 G/M2
LEFT VENTRICLE SYSTOLIC VOLUME INDEX: 49.8 ML/M2
LEFT VENTRICLE SYSTOLIC VOLUME: 117 ML
LEFT VENTRICULAR INTERNAL DIMENSION IN DIASTOLE: 5.7 CM (ref 3.5–6)
LEFT VENTRICULAR MASS: 144.3 G
LV LATERAL E/E' RATIO: 10.1
LV SEPTAL E/E' RATIO: 11.4
MAGNESIUM SERPL-MCNC: 2 MG/DL (ref 1.6–2.6)
MV PEAK A VEL: 0.33 M/S
MV PEAK E VEL: 0.91 M/S
NONHDLC SERPL-MCNC: 94 MG/DL
OHS CV RV/LV RATIO: 0.86 CM
OHS LV EJECTION FRACTION SIMPSONS BIPLANE MOD: 17 %
OHS QRS DURATION: 86 MS
OHS QTC CALCULATION: 472 MS
PHOSPHATE SERPL-MCNC: 4 MG/DL (ref 2.7–4.5)
PISA TR MAX VEL: 1.7 M/S
POTASSIUM SERPL-SCNC: 3.9 MMOL/L (ref 3.5–5.1)
PROT SERPL-MCNC: 5.7 G/DL (ref 6–8.4)
RA MAJOR: 4.98 CM
RA PRESSURE ESTIMATED: 15 MMHG
RA WIDTH: 4.5 CM
RIGHT VENTRICLE DIASTOLIC BASEL DIMENSION: 4.9 CM
RV TB RVSP: 17 MMHG
RV TISSUE DOPPLER FREE WALL SYSTOLIC VELOCITY 1 (APICAL 4 CHAMBER VIEW): 7.94 CM/S
SINUS: 3.42 CM
SODIUM SERPL-SCNC: 141 MMOL/L (ref 136–145)
STJ: 2.92 CM
T4 FREE SERPL-MCNC: 1 NG/DL (ref 0.71–1.51)
TDI LATERAL: 0.09 M/S
TDI SEPTAL: 0.08 M/S
TDI: 0.09 M/S
TRICUSPID ANNULAR PLANE SYSTOLIC EXCURSION: 1.47 CM
TRIGL SERPL-MCNC: 54 MG/DL (ref 30–150)
TSH SERPL DL<=0.005 MIU/L-ACNC: 1.07 UIU/ML (ref 0.4–4)
TV PEAK GRADIENT: 12 MMHG
TV REST PULMONARY ARTERY PRESSURE: 27 MMHG
Z-SCORE OF LEFT VENTRICULAR DIMENSION IN END DIASTOLE: -5.45
Z-SCORE OF LEFT VENTRICULAR DIMENSION IN END SYSTOLE: -1.32

## 2025-03-15 PROCEDURE — 63700000 PHARM REV CODE 250 ALT 637 W/O HCPCS: Performed by: HOSPITALIST

## 2025-03-15 PROCEDURE — 94761 N-INVAS EAR/PLS OXIMETRY MLT: CPT

## 2025-03-15 PROCEDURE — 99223 1ST HOSP IP/OBS HIGH 75: CPT | Mod: ,,, | Performed by: STUDENT IN AN ORGANIZED HEALTH CARE EDUCATION/TRAINING PROGRAM

## 2025-03-15 PROCEDURE — 36415 COLL VENOUS BLD VENIPUNCTURE: CPT | Performed by: HOSPITALIST

## 2025-03-15 PROCEDURE — 82248 BILIRUBIN DIRECT: CPT | Performed by: HOSPITALIST

## 2025-03-15 PROCEDURE — 84443 ASSAY THYROID STIM HORMONE: CPT | Performed by: HOSPITALIST

## 2025-03-15 PROCEDURE — 80061 LIPID PANEL: CPT | Performed by: HOSPITALIST

## 2025-03-15 PROCEDURE — 25000003 PHARM REV CODE 250: Performed by: STUDENT IN AN ORGANIZED HEALTH CARE EDUCATION/TRAINING PROGRAM

## 2025-03-15 PROCEDURE — 83036 HEMOGLOBIN GLYCOSYLATED A1C: CPT | Performed by: HOSPITALIST

## 2025-03-15 PROCEDURE — 99232 SBSQ HOSP IP/OBS MODERATE 35: CPT | Mod: ,,, | Performed by: INTERNAL MEDICINE

## 2025-03-15 PROCEDURE — 83735 ASSAY OF MAGNESIUM: CPT | Performed by: HOSPITALIST

## 2025-03-15 PROCEDURE — 25500020 PHARM REV CODE 255: Performed by: INTERNAL MEDICINE

## 2025-03-15 PROCEDURE — 25000003 PHARM REV CODE 250: Performed by: NURSE PRACTITIONER

## 2025-03-15 PROCEDURE — 84100 ASSAY OF PHOSPHORUS: CPT | Performed by: HOSPITALIST

## 2025-03-15 PROCEDURE — 25000003 PHARM REV CODE 250: Performed by: HOSPITALIST

## 2025-03-15 PROCEDURE — 80053 COMPREHEN METABOLIC PANEL: CPT | Performed by: HOSPITALIST

## 2025-03-15 PROCEDURE — 84439 ASSAY OF FREE THYROXINE: CPT | Performed by: HOSPITALIST

## 2025-03-15 PROCEDURE — 63600175 PHARM REV CODE 636 W HCPCS: Performed by: HOSPITALIST

## 2025-03-15 RX ORDER — FUROSEMIDE 10 MG/ML
20 INJECTION INTRAMUSCULAR; INTRAVENOUS DAILY
Status: DISCONTINUED | OUTPATIENT
Start: 2025-03-15 | End: 2025-03-15 | Stop reason: HOSPADM

## 2025-03-15 RX ORDER — AZITHROMYCIN 250 MG/1
250 TABLET, FILM COATED ORAL DAILY
Status: DISCONTINUED | OUTPATIENT
Start: 2025-03-15 | End: 2025-03-15 | Stop reason: HOSPADM

## 2025-03-15 RX ORDER — AMIODARONE HYDROCHLORIDE 200 MG/1
TABLET ORAL
Qty: 416 TABLET | Refills: 0 | Status: ON HOLD | OUTPATIENT
Start: 2025-03-15 | End: 2025-03-28

## 2025-03-15 RX ORDER — VALSARTAN 40 MG/1
40 TABLET ORAL 2 TIMES DAILY
Qty: 180 TABLET | Refills: 3 | Status: SHIPPED | OUTPATIENT
Start: 2025-03-15 | End: 2025-04-01 | Stop reason: ALTCHOICE

## 2025-03-15 RX ORDER — METOPROLOL SUCCINATE 25 MG/1
25 TABLET, EXTENDED RELEASE ORAL DAILY
Qty: 90 TABLET | Refills: 3 | Status: SHIPPED | OUTPATIENT
Start: 2025-03-16 | End: 2025-03-19

## 2025-03-15 RX ORDER — FUROSEMIDE 20 MG/1
20 TABLET ORAL DAILY
Qty: 30 TABLET | Refills: 11 | Status: SHIPPED | OUTPATIENT
Start: 2025-03-15 | End: 2026-03-15

## 2025-03-15 RX ORDER — FUROSEMIDE 10 MG/ML
40 INJECTION INTRAMUSCULAR; INTRAVENOUS DAILY
Status: DISCONTINUED | OUTPATIENT
Start: 2025-03-15 | End: 2025-03-15

## 2025-03-15 RX ADMIN — FUROSEMIDE 20 MG: 10 INJECTION, SOLUTION INTRAMUSCULAR; INTRAVENOUS at 08:03

## 2025-03-15 RX ADMIN — HUMAN ALBUMIN MICROSPHERES AND PERFLUTREN 0.66 MG: 10; .22 INJECTION, SOLUTION INTRAVENOUS at 11:03

## 2025-03-15 RX ADMIN — AZELASTINE 137 MCG: 1 SPRAY, METERED NASAL at 08:03

## 2025-03-15 RX ADMIN — APIXABAN 5 MG: 5 TABLET, FILM COATED ORAL at 08:03

## 2025-03-15 RX ADMIN — AZITHROMYCIN DIHYDRATE 250 MG: 250 TABLET ORAL at 08:03

## 2025-03-15 RX ADMIN — METOPROLOL SUCCINATE 25 MG: 25 TABLET, EXTENDED RELEASE ORAL at 08:03

## 2025-03-15 RX ADMIN — AMIODARONE HYDROCHLORIDE 400 MG: 200 TABLET ORAL at 08:03

## 2025-03-15 RX ADMIN — GUAIFENESIN AND DEXTROMETHORPHAN HYDROBROMIDE 1 TABLET: 600; 30 TABLET, EXTENDED RELEASE ORAL at 08:03

## 2025-03-15 RX ADMIN — CETIRIZINE HYDROCHLORIDE 10 MG: 10 TABLET, FILM COATED ORAL at 08:03

## 2025-03-15 NOTE — NURSING
Patient PIV removed, telemetry box removed, discharge instructions provided, patient left floor in wheelchair at 1352

## 2025-03-15 NOTE — CONSULTS
[facility] - Cardiology Stepdown  Cardiac Electrophysiology  Consult Note    Admission Date: 3/14/2025  Code Status: Full Code   Attending Provider: Liz Neff MD  Consulting Provider: Annalee Faye MD  Principal Problem:Acute systolic heart failure    Inpatient consult to Electrophysiology  Consult performed by: Annalee Faye MD  Consult ordered by: Annalee Faye MD  Reason for consult: AF        Subjective:     Chief Complaint:  Dyspnea     HPI:   Danilo Krause is a 45 y.o. M with no prior pmhx presenting with new onset decompensated heart failure in the s/o AF RVR.     Patient previously physically active without limitations until 1 mo ago when developed dyspnea on exertion, fatigue, and orthopnea. Evaluated by his PCP and multiple urgent cares for symptoms, with imaging noting edema. However, treated for bronchitis and reactive airway disease. Seen by provider via telemedicine on 3/11/25 and there was concern for new onset HF, therefore referred to cardiology with appt scheduled 3/18/25.     Patient seen in the community by Dr. Appiah yesterday where on evaluation, pulse was irregular consistent with atrial fibrillation, rate was ~120-130 bpm. [facility] mobile ECG tracing was also consistent with atrial fibrillation. He then followed up in Dr. Appiah' clinic yesterday for his atrial arrhythmia and dyspnea. He was scheduled for outpatient JOSEF cardioversion. Successful cardioversion but found to have EF of 15-20% and a concern for acute decompensated heart failure so was admitted to hospital medicine for diuresis and further care.     Of note, his father and paternal uncle both have had atrial fibrillation with AF related heart failure. Both ablated by Dr. Perez.    On admission, patient in sinus rhythm. He has been diuresed and appears near euvolemic, feeling back to baseline.     Past Medical History:   Diagnosis Date    Anticoagulant long-term use     atrial fibrillation     Severe obesity (BMI >= 40)  08/22/2020       History reviewed. No pertinent surgical history.    Review of patient's allergies indicates:  No Known Allergies    No current facility-administered medications on file prior to encounter.     Current Outpatient Medications on File Prior to Encounter   Medication Sig    albuterol (PROVENTIL/VENTOLIN HFA) 90 mcg/actuation inhaler Inhale 1 puff into the lungs every 6 (six) hours as needed.    apixaban (ELIQUIS) 5 mg Tab Take 1 tablet (5 mg total) by mouth 2 (two) times daily.    azelastine (ASTELIN) 137 mcg (0.1 %) nasal spray 1 spray (137 mcg total) by Nasal route 2 (two) times daily.    azithromycin (Z-ARCADIO) 250 MG tablet Take 2 tablets by mouth on day 1; Take 1 tablet by mouth on days 2-5    benzonatate (TESSALON) 100 MG capsule Take 2 capsules (200 mg total) by mouth 3 (three) times daily as needed.    fluticasone propionate (FLONASE) 50 mcg/actuation nasal spray 1 spray (50 mcg total) by Each Nostril route once daily.    guaiFENesin (MUCINEX) 600 mg 12 hr tablet Take 2 tablets (1,200 mg total) by mouth 2 (two) times daily. for 10 days    promethazine-dextromethorphan (PROMETHAZINE-DM) 6.25-15 mg/5 mL Syrp Take 5 mLs by mouth.    cetirizine (ZYRTEC) 10 MG tablet Take 1 tablet (10 mg total) by mouth once daily.     Family History       Problem Relation (Age of Onset)    Atrial fibrillation Father    Hypertension Father          Tobacco Use    Smoking status: Never    Smokeless tobacco: Never   Substance and Sexual Activity    Alcohol use: Yes     Comment: socially    Drug use: Never    Sexual activity: Yes     Partners: Female     Review of Systems   Constitutional: Negative for chills and fever.   Cardiovascular:  Positive for leg swelling. Negative for dyspnea on exertion, near-syncope, orthopnea, palpitations and syncope.   Respiratory:  Negative for shortness of breath.    Gastrointestinal:  Negative for abdominal pain.   Neurological:  Negative for light-headedness.     Objective:     Vital  Signs (Most Recent):  Temp: 98.6 °F (37 °C) (03/15/25 0731)  Pulse: 79 (03/15/25 0815)  Resp: 18 (03/15/25 0731)  BP: (!) 129/90 (03/15/25 0731)  SpO2: 96 % (03/15/25 0731) Vital Signs (24h Range):  Temp:  [97.7 °F (36.5 °C)-98.6 °F (37 °C)] 98.6 °F (37 °C)  Pulse:  [] 79  Resp:  [18-22] 18  SpO2:  [94 %-98 %] 96 %  BP: (109-136)/() 129/90       Weight: 129.3 kg (285 lb)  Body mass index is 44.64 kg/m².    SpO2: 96 %        Physical Exam  Constitutional:       Appearance: Normal appearance.   Eyes:      Extraocular Movements: Extraocular movements intact.      Conjunctiva/sclera: Conjunctivae normal.   Cardiovascular:      Rate and Rhythm: Normal rate and regular rhythm.   Pulmonary:      Effort: Pulmonary effort is normal.   Abdominal:      General: Abdomen is flat.      Palpations: Abdomen is soft.   Musculoskeletal:      Right lower leg: Edema present.      Left lower leg: Edema present.   Skin:     General: Skin is warm and dry.   Neurological:      General: No focal deficit present.      Mental Status: He is alert and oriented to person, place, and time.            Significant Labs: CMP:   Recent Labs   Lab 03/14/25  1842 03/15/25  0358    141   K 3.9 3.9    109   CO2 27 23   GLU 93 79   BUN 12 14   CREATININE 1.0 0.8   CALCIUM 8.6* 8.3*   PROT 6.8 5.7*   ALBUMIN 3.8 3.3*   BILITOT 1.8* 1.5*   ALKPHOS 68 56   AST 29 26   ALT 51* 40   ANIONGAP 8 9   , CBC:   Recent Labs   Lab 03/14/25  1842   WBC 6.02   HGB 15.5   HCT 46.1      , and INR:   Recent Labs   Lab 03/14/25  1232   INR 1.2       Significant Imaging: X-Ray: CXR: X-Ray Chest 1 View (CXR): No results found for this visit on 03/14/25.      Assessment and Plan:     New onset atrial fibrillation  46 yo M with no prior cardiac history, admitted with decompensated heart failure with EF 15-20% in the s/o AF RVR. Patient now s/p JOSEF/DCCV yesterday, in sinus rhythm. S/p diuresis and appears near euvolemic, feeling back to baseline.      Recommendations:  - Continue Amiodarone 400mg bid x14d, then 200mg qd with plan to not remain on it long term  - Continue Metop succ 25mg qd  - Continue Eliquis 5mg bid   - Please place EP follow up outpatient with Dr. Appiah on discharge     Thank you for your consult. I will sign off. Please contact us if you have any additional questions.    Annalee Faye MD  Cardiac Electrophysiology  [facility] - Card[facility] Step[facility]

## 2025-03-15 NOTE — SUBJECTIVE & OBJECTIVE
Past Medical History:   Diagnosis Date    Anticoagulant long-term use     atrial fibrillation     Severe obesity (BMI >= 40) 08/22/2020       History reviewed. No pertinent surgical history.    Review of patient's allergies indicates:  No Known Allergies    No current facility-administered medications on file prior to encounter.     Current Outpatient Medications on File Prior to Encounter   Medication Sig    albuterol (PROVENTIL/VENTOLIN HFA) 90 mcg/actuation inhaler Inhale 1 puff into the lungs every 6 (six) hours as needed.    apixaban (ELIQUIS) 5 mg Tab Take 1 tablet (5 mg total) by mouth 2 (two) times daily.    azelastine (ASTELIN) 137 mcg (0.1 %) nasal spray 1 spray (137 mcg total) by Nasal route 2 (two) times daily.    azithromycin (Z-ARCADIO) 250 MG tablet Take 2 tablets by mouth on day 1; Take 1 tablet by mouth on days 2-5    benzonatate (TESSALON) 100 MG capsule Take 2 capsules (200 mg total) by mouth 3 (three) times daily as needed.    fluticasone propionate (FLONASE) 50 mcg/actuation nasal spray 1 spray (50 mcg total) by Each Nostril route once daily.    guaiFENesin (MUCINEX) 600 mg 12 hr tablet Take 2 tablets (1,200 mg total) by mouth 2 (two) times daily. for 10 days    promethazine-dextromethorphan (PROMETHAZINE-DM) 6.25-15 mg/5 mL Syrp Take 5 mLs by mouth.    cetirizine (ZYRTEC) 10 MG tablet Take 1 tablet (10 mg total) by mouth once daily.     Family History       Problem Relation (Age of Onset)    Atrial fibrillation Father    Hypertension Father          Tobacco Use    Smoking status: Never    Smokeless tobacco: Never   Substance and Sexual Activity    Alcohol use: Yes     Comment: socially    Drug use: Never    Sexual activity: Yes     Partners: Female     Review of Systems   Cardiovascular:  Positive for dyspnea on exertion.     Objective:     Vital Signs (Most Recent):  Temp: 98.6 °F (37 °C) (03/15/25 0731)  Pulse: 76 (03/15/25 0731)  Resp: 18 (03/15/25 0731)  BP: (!) 129/90 (03/15/25 0731)  SpO2: 96  % (03/15/25 0731) Vital Signs (24h Range):  Temp:  [97.7 °F (36.5 °C)-98.6 °F (37 °C)] 98.6 °F (37 °C)  Pulse:  [] 76  Resp:  [18-22] 18  SpO2:  [94 %-98 %] 96 %  BP: (109-136)/() 129/90     Weight: 129.3 kg (285 lb)  Body mass index is 44.64 kg/m².    SpO2: 96 %         Intake/Output Summary (Last 24 hours) at 3/15/2025 0933  Last data filed at 3/15/2025 0612  Gross per 24 hour   Intake 440 ml   Output 1010 ml   Net -570 ml       Lines/Drains/Airways       Peripheral Intravenous Line  Duration                  Peripheral IV - Single Lumen 03/14/25 1348 20 G Left;Posterior Hand <1 day                     Physical Exam  Constitutional:       General: He is not in acute distress.     Appearance: Normal appearance. He is not ill-appearing.   HENT:      Head: Normocephalic and atraumatic.      Nose: No congestion.      Mouth/Throat:      Mouth: Mucous membranes are moist.   Eyes:      Conjunctiva/sclera: Conjunctivae normal.   Cardiovascular:      Rate and Rhythm: Normal rate and regular rhythm.      Pulses: Normal pulses.   Pulmonary:      Effort: Pulmonary effort is normal. No respiratory distress.   Abdominal:      General: Abdomen is flat. There is no distension.      Palpations: Abdomen is soft.   Musculoskeletal:      Cervical back: Normal range of motion.      Right lower leg: No edema.      Left lower leg: No edema.   Skin:     Capillary Refill: Capillary refill takes less than 2 seconds.      Findings: No rash.   Neurological:      Mental Status: He is alert and oriented to person, place, and time.   Psychiatric:         Mood and Affect: Mood normal.          Significant Labs: All pertinent lab results from the last 24 hours have been reviewed.

## 2025-03-15 NOTE — ASSESSMENT & PLAN NOTE
He has mild elevation of T.bili that is slightly increased recently, mild ALT elevation and CT abdomen imaging concern for hepatic steatosis, patient at risk for metabolic syndrome given severe obesity.     Check lipid panel and A1c in AM.

## 2025-03-15 NOTE — ASSESSMENT & PLAN NOTE
45 year old admitted for ADHF after JOSEF/CV. Newly reduced EF and volume overloaded. Great UOP after lasix 20mg IV x 1 yesterday but I&Os not charted. Reports symptoms are much improved, walking around unit with no issues.     Recs  Start valsartan 40 mg BID, first dose now  Continue toprol   Lasix 20mg IV today, discharge home on lasix 20 mg po prn for symptoms or weight gain   Would obtain TTE prior to discharge  Once TTE done can discharge home   Follow up in gen cards and EP clinic, needs referrals

## 2025-03-15 NOTE — ASSESSMENT & PLAN NOTE
EF 20% on JOSEF, etiology likely secondary to newly diagnosed atrial fibrillation, but pt with family hx of cardiac disease, could have alternate etiology.   -cardiology consult for AM.   Order formal TTE   -Toprol XL ordered by EP to start tomorrow   -s/p Lasix 20mg IV today - pt dumped many initial voids, monitor I/O, physical exam and weights daily  - continue 20mg IV daily and modify based on response  -sodium and fluid restriction

## 2025-03-15 NOTE — ASSESSMENT & PLAN NOTE
As an outpatient - patient is on cetirizine, recently prescribe astelin nasal spray.  He also was taking course of augmentin for pneumonia, now is on Z-mike azithromycin.     Continue on Astelin, azithromycin to finish his course.   Continue prn cough medications and scheduled.

## 2025-03-15 NOTE — NURSING
Patient discharge home with wife and son. Telemetry box and PIV dcd prior to patient leaving the floor

## 2025-03-15 NOTE — H&P
[facility] - Cardiology [facility] Medicine  History & Physical    Patient Name: Danilo Krause  MRN: [id number]  Patient Class: IP- Inpatient  Admission Date: 3/14/2025  Attending Physician: Terrence Prescott MD [facility]   Admitting Physician: Terrence Prescott MD  Primary Care Provider: Sandy Gastelum MD      Patient information was obtained from patient and past medical records.     Subjective:     Principal Problem:Acute systolic heart failure    Chief Complaint:   Chief Complaint   Patient presents with    Dyspnea on exertion        HPI: 45-year-old man with severe obesity, newly diagnosed atrial fibrillation is referred for admission status post EP DC cardioversion for concerns of acute onset systolic heart failure.      Patient reports that for about 3 weeks ago he started experiencing progressively worsening dyspnea on exertion.  No associated chest pain or palpitations reported, he did not feel or denote that he had increased weight gain.  He reports visiting to urgent cares and having a telehealth visit, no EKG was performed at these visits and he states he was not ever told after a cardiac exam that he had an irregular or abnormal heartbeat.  He had findings of orthopnea, as an outpatient did have chest x-ray which showed evidence of pleural effusion and subsequently had a CT chest that showed bilateral pleural effusions.   Diagnosis made when patient's family spoke to Dr. Michael Appiah, they are family friends and in the moment Dr. Appiah noted pt with irregular rapid pulse.  Pt seen in EP clinic this week and ambulatory DC Cardioversion planned for today.   On JOSEF he was found with EF of 20%, EP team performed successful DC Cardioversion today, patient is in sinus rhythm.  He was then referred for admission for management of acute systolic heart failure.     Patient is non-smoker, reports remote alcohol use.  His father and 1 paternal uncle w/ atrial fibrillation.  He reports 1 paternal  uncle  @ age 46 presumed heart attack.       He was given a 20 mg IV dose of Lasix in the PACU, reports he has urinated upwards of 8 times but has not been measuring urine output.  Patient counseled to either use urinal or notified nurse prior to dumping any urine output in order for it to be tracked.     Past Medical History:   Diagnosis Date    Anticoagulant long-term use     atrial fibrillation     Severe obesity (BMI >= 40) 2020       History reviewed. No pertinent surgical history.    Review of patient's allergies indicates:  No Known Allergies    No current facility-administered medications on file prior to encounter.     Current Outpatient Medications on File Prior to Encounter   Medication Sig    albuterol (PROVENTIL/VENTOLIN HFA) 90 mcg/actuation inhaler Inhale 1 puff into the lungs every 6 (six) hours as needed.    apixaban (ELIQUIS) 5 mg Tab Take 1 tablet (5 mg total) by mouth 2 (two) times daily.    azelastine (ASTELIN) 137 mcg (0.1 %) nasal spray 1 spray (137 mcg total) by Nasal route 2 (two) times daily.    azithromycin (Z-ARCADIO) 250 MG tablet Take 2 tablets by mouth on day 1; Take 1 tablet by mouth on days 2-5    benzonatate (TESSALON) 100 MG capsule Take 2 capsules (200 mg total) by mouth 3 (three) times daily as needed.    fluticasone propionate (FLONASE) 50 mcg/actuation nasal spray 1 spray (50 mcg total) by Each Nostril route once daily.    guaiFENesin (MUCINEX) 600 mg 12 hr tablet Take 2 tablets (1,200 mg total) by mouth 2 (two) times daily. for 10 days    promethazine-dextromethorphan (PROMETHAZINE-DM) 6.25-15 mg/5 mL Syrp Take 5 mLs by mouth.    cetirizine (ZYRTEC) 10 MG tablet Take 1 tablet (10 mg total) by mouth once daily.     Family History       Problem Relation (Age of Onset)    Atrial fibrillation Father    Hypertension Father          Tobacco Use    Smoking status: Never    Smokeless tobacco: Never   Substance and Sexual Activity    Alcohol use: Yes     Comment: socially     Drug use: Never    Sexual activity: Yes     Partners: Female     Review of Systems   Constitutional:  Positive for activity change. Negative for appetite change, chills, fever and unexpected weight change.   HENT: Negative.     Cardiovascular:  Positive for leg swelling.   Gastrointestinal: Negative.    Genitourinary: Negative.    Neurological:  Negative for syncope and weakness.   Psychiatric/Behavioral:  Negative for confusion.      Objective:     Vital Signs (Most Recent):  Temp: 98.2 °F (36.8 °C) (03/14/25 2240)  Pulse: 74 (03/15/25 0000)  Resp: 18 (03/14/25 2240)  BP: 109/74 (03/14/25 2240)  SpO2: 96 % (03/14/25 2240) Vital Signs (24h Range):  Temp:  [97.7 °F (36.5 °C)-98.2 °F (36.8 °C)] 98.2 °F (36.8 °C)  Pulse:  [] 74  Resp:  [18-22] 18  SpO2:  [94 %-98 %] 96 %  BP: (109-136)/() 109/74     Weight: 129.3 kg (285 lb)  Body mass index is 44.64 kg/m².     Physical Exam  Vitals and nursing note reviewed.   Constitutional:       General: He is not in acute distress.     Appearance: Normal appearance. He is obese.   HENT:      Head: Normocephalic and atraumatic.      Mouth/Throat:      Mouth: Mucous membranes are moist.      Pharynx: Oropharynx is clear. No oropharyngeal exudate or posterior oropharyngeal erythema.   Neck:      Comments: Unable to note the JVP  Cardiovascular:      Rate and Rhythm: Normal rate and regular rhythm.      Pulses: Normal pulses.      Heart sounds: No murmur heard.     Comments: Pitting edema up to the knee  Pulmonary:      Effort: Pulmonary effort is normal. No respiratory distress.      Breath sounds: Examination of the right-lower field reveals decreased breath sounds. Examination of the left-lower field reveals decreased breath sounds. Decreased breath sounds present. No wheezing.   Abdominal:      General: Bowel sounds are normal. There is no distension.      Palpations: Abdomen is soft.      Tenderness: There is no abdominal tenderness.   Musculoskeletal:      Cervical  "back: Neck supple.      Right lower leg: 3+ Pitting Edema present.      Left lower leg: Pitting Edema present.   Lymphadenopathy:      Cervical: No cervical adenopathy.   Neurological:      Mental Status: He is alert.                Significant Labs: All pertinent labs within the past 24 hours have been reviewed.  CBC:   Recent Labs   Lab 03/14/25  1842   WBC 6.02   HGB 15.5   HCT 46.1        CMP:   Recent Labs   Lab 03/14/25  1842      K 3.9      CO2 27   GLU 93   BUN 12   CREATININE 1.0   CALCIUM 8.6*   PROT 6.8   ALBUMIN 3.8   BILITOT 1.8*   ALKPHOS 68   AST 29   ALT 51*   ANIONGAP 8     Cardiac Markers:   Recent Labs   Lab 03/14/25  1842   *     Coagulation:   Recent Labs   Lab 03/14/25  1232   INR 1.2     Lactic Acid: No results for input(s): "LACTATE" in the last 48 hours.  Lipase: No results for input(s): "LIPASE" in the last 48 hours.  Magnesium:   Recent Labs   Lab 03/14/25  1842   MG 1.9     Pathology Results  (Last 10 years)      None          Troponin: No results for input(s): "TROPONINI", "TROPONINIHS" in the last 48 hours.  Urine Studies: No results for input(s): "COLORU", "APPEARANCEUA", "PHUR", "SPECGRAV", "PROTEINUA", "GLUCUA", "KETONESU", "BILIRUBINUA", "OCCULTUA", "NITRITE", "UROBILINOGEN", "LEUKOCYTESUR", "RBCUA", "WBCUA", "BACTERIA", "SQUAMEPITHEL", "HYALINECASTS" in the last 48 hours.    Invalid input(s): "WRIGHTSUR"    Significant Imaging: I have reviewed all pertinent imaging results/findings within the past 24 hours.    CT CHEST WITHOUT CONTRAST     CLINICAL HISTORY:  Dyspnea, chronic, unclear etiology; Shortness of breath     TECHNIQUE:  Low dose axial images, sagittal and coronal reformations were obtained from the thoracic inlet to the lung bases. Contrast was not administered.     FINDINGS:  The soft tissues and vascular structures at the base the neck are unremarkable.  The mediastinum including the heart and great vessels is unremarkable.  The visualized the " content is significant for fatty infiltration of the liver.  The osseous structures demonstrate degenerative change.     The trachea is patent and free of any intraluminal filling defects.  The lungs are well expanded.  There is minimal atelectatic change overlying bilateral pleural fluid collections.  There is a 0.4 cm right middle lobe noncalcified pulmonary nodule series 2, image 69.     Impression:     Bilateral pleural fluid collections.     Fatty liver.        Electronically signed by: Agustín Srivastava MD  Date:                                            03/12/2025  Time:                                           08:48  Assessment/Plan:     * Acute systolic heart failure  EF 20% on JOSEF, etiology likely secondary to newly diagnosed atrial fibrillation, but pt with family hx of cardiac disease, could have alternate etiology.   -cardiology consult for AM.   Order formal TTE   -Toprol XL ordered by EP to start tomorrow   -s/p Lasix 20mg IV today - pt dumped many initial voids, monitor I/O, physical exam and weights daily  - continue 20mg IV daily and modify based on response  -sodium and fluid restriction      New onset atrial fibrillation  Patient has persistent (7 days or more) atrial fibrillation. Patient is currently in sinus rhythm. [id number] Score: 1. The patients heart rate in the last 24 hours is as follows:  Pulse  Min: 74  Max: 164     Antiarrhythmics  amiodarone tablet 400 mg, 2 times daily, Oral  metoprolol succinate (TOPROL-XL) 24 hr tablet 25 mg, Daily, Oral    Anticoagulants  apixaban tablet 5 mg, 2 times daily, Oral    Plan  - Replete lytes with a goal of K>4, Mg >2  - Patient is anticoagulated, see medications listed above.  - Patient's afib is currently controlled  - starting amiodarone per EP recs today - discharge with appropriate dosages for loading & maintenance  -check TSH and Free t4   -telemetry monitoring   -Toprol XL in AM per EP team   -f/u any further EP recs.         Severe obesity (BMI  >= 40)  Body mass index is 44.64 kg/m². Morbid obesity complicates all aspects of disease management from diagnostic modalities to treatment. Weight loss encouraged and health benefits explained to patient.     Check Lipid panel in AM      Elevated bilirubin  He has mild elevation of T.bili that is slightly increased recently, mild ALT elevation and CT abdomen imaging concern for hepatic steatosis, patient at risk for metabolic syndrome given severe obesity.     Check lipid panel and A1c in AM.       Dyspnea on exertion  As an outpatient - patient is on cetirizine, recently prescribe astelin nasal spray.  He also was taking course of augmentin for pneumonia, now is on Z-mike azithromycin.     Continue on Astelin, azithromycin to finish his course.   Continue prn cough medications and scheduled.           VTE Risk Mitigation (From admission, onward)           Ordered     apixaban tablet 5 mg  2 times daily         03/14/25 7003                            Terrence Prescott MD  Department of Hospital Medicine  [facility] - Cardiology Stepdown

## 2025-03-15 NOTE — ASSESSMENT & PLAN NOTE
Body mass index is 44.64 kg/m². Morbid obesity complicates all aspects of disease management from diagnostic modalities to treatment. Weight loss encouraged and health benefits explained to patient.     Check Lipid panel in AM

## 2025-03-15 NOTE — ASSESSMENT & PLAN NOTE
Patient has persistent (7 days or more) atrial fibrillation. Patient is currently in sinus rhythm. [id number] Score: 1. The patients heart rate in the last 24 hours is as follows:  Pulse  Min: 74  Max: 164     Antiarrhythmics  amiodarone tablet 400 mg, 2 times daily, Oral  metoprolol succinate (TOPROL-XL) 24 hr tablet 25 mg, Daily, Oral    Anticoagulants  apixaban tablet 5 mg, 2 times daily, Oral    Plan  - Replete lytes with a goal of K>4, Mg >2  - Patient is anticoagulated, see medications listed above.  - Patient's afib is currently controlled  - starting amiodarone per EP recs today - discharge with appropriate dosages for loading & maintenance  -check TSH and Free t4   -telemetry monitoring   -Toprol XL in AM per EP team   -f/u any further EP recs.

## 2025-03-15 NOTE — SUBJECTIVE & OBJECTIVE
Past Medical History:   Diagnosis Date    Anticoagulant long-term use     atrial fibrillation     Severe obesity (BMI >= 40) 08/22/2020       History reviewed. No pertinent surgical history.    Review of patient's allergies indicates:  No Known Allergies    No current facility-administered medications on file prior to encounter.     Current Outpatient Medications on File Prior to Encounter   Medication Sig    albuterol (PROVENTIL/VENTOLIN HFA) 90 mcg/actuation inhaler Inhale 1 puff into the lungs every 6 (six) hours as needed.    apixaban (ELIQUIS) 5 mg Tab Take 1 tablet (5 mg total) by mouth 2 (two) times daily.    azelastine (ASTELIN) 137 mcg (0.1 %) nasal spray 1 spray (137 mcg total) by Nasal route 2 (two) times daily.    azithromycin (Z-ARCADIO) 250 MG tablet Take 2 tablets by mouth on day 1; Take 1 tablet by mouth on days 2-5    benzonatate (TESSALON) 100 MG capsule Take 2 capsules (200 mg total) by mouth 3 (three) times daily as needed.    fluticasone propionate (FLONASE) 50 mcg/actuation nasal spray 1 spray (50 mcg total) by Each Nostril route once daily.    guaiFENesin (MUCINEX) 600 mg 12 hr tablet Take 2 tablets (1,200 mg total) by mouth 2 (two) times daily. for 10 days    promethazine-dextromethorphan (PROMETHAZINE-DM) 6.25-15 mg/5 mL Syrp Take 5 mLs by mouth.    cetirizine (ZYRTEC) 10 MG tablet Take 1 tablet (10 mg total) by mouth once daily.     Family History       Problem Relation (Age of Onset)    Atrial fibrillation Father    Hypertension Father          Tobacco Use    Smoking status: Never    Smokeless tobacco: Never   Substance and Sexual Activity    Alcohol use: Yes     Comment: socially    Drug use: Never    Sexual activity: Yes     Partners: Female     Review of Systems   Constitutional: Negative for chills and fever.   Cardiovascular:  Positive for leg swelling. Negative for dyspnea on exertion, near-syncope, orthopnea, palpitations and syncope.   Respiratory:  Negative for shortness of breath.     Gastrointestinal:  Negative for abdominal pain.   Neurological:  Negative for light-headedness.     Objective:     Vital Signs (Most Recent):  Temp: 98.6 °F (37 °C) (03/15/25 0731)  Pulse: 79 (03/15/25 0815)  Resp: 18 (03/15/25 0731)  BP: (!) 129/90 (03/15/25 0731)  SpO2: 96 % (03/15/25 0731) Vital Signs (24h Range):  Temp:  [97.7 °F (36.5 °C)-98.6 °F (37 °C)] 98.6 °F (37 °C)  Pulse:  [] 79  Resp:  [18-22] 18  SpO2:  [94 %-98 %] 96 %  BP: (109-136)/() 129/90       Weight: 129.3 kg (285 lb)  Body mass index is 44.64 kg/m².    SpO2: 96 %        Physical Exam  Constitutional:       Appearance: Normal appearance.   Eyes:      Extraocular Movements: Extraocular movements intact.      Conjunctiva/sclera: Conjunctivae normal.   Cardiovascular:      Rate and Rhythm: Normal rate and regular rhythm.   Pulmonary:      Effort: Pulmonary effort is normal.   Abdominal:      General: Abdomen is flat.      Palpations: Abdomen is soft.   Musculoskeletal:      Right lower leg: Edema present.      Left lower leg: Edema present.   Skin:     General: Skin is warm and dry.   Neurological:      General: No focal deficit present.      Mental Status: He is alert and oriented to person, place, and time.            Significant Labs: CMP:   Recent Labs   Lab 03/14/25  1842 03/15/25  0358    141   K 3.9 3.9    109   CO2 27 23   GLU 93 79   BUN 12 14   CREATININE 1.0 0.8   CALCIUM 8.6* 8.3*   PROT 6.8 5.7*   ALBUMIN 3.8 3.3*   BILITOT 1.8* 1.5*   ALKPHOS 68 56   AST 29 26   ALT 51* 40   ANIONGAP 8 9   , CBC:   Recent Labs   Lab 03/14/25  1842   WBC 6.02   HGB 15.5   HCT 46.1      , and INR:   Recent Labs   Lab 03/14/25  1232   INR 1.2       Significant Imaging: X-Ray: CXR: X-Ray Chest 1 View (CXR): No results found for this visit on 03/14/25.

## 2025-03-15 NOTE — HPI
Danilo Krause is a 45 y.o. M with no prior pmhx presenting with new onset decompensated heart failure in the s/o AF RVR.     Patient previously physically active without limitations until 1 mo ago when developed dyspnea on exertion, fatigue, and orthopnea. Evaluated by his PCP and multiple urgent cares for symptoms, with imaging noting edema. However, treated for bronchitis and reactive airway disease. Seen by provider via telemedicine on 3/11/25 and there was concern for new onset HF, therefore referred to cardiology with appt scheduled 3/18/25.     Patient seen in the community by Dr. Appiah yesterday where on evaluation, pulse was irregular consistent with atrial fibrillation, rate was ~120-130 bpm. [employer] mobile ECG tracing was also consistent with atrial fibrillation. He then followed up in Dr. Appiah' clinic yesterday for his atrial arrhythmia and dyspnea. He was scheduled for outpatient JOSEF cardioversion. Successful cardioversion but found to have EF of 15-20% and a concern for acute decompensated heart failure so was admitted to hospital medicine for diuresis and further care.     Of note, his father and paternal uncle both have had atrial fibrillation with AF related heart failure. Both ablated by Dr. Perez.    On admission, patient in sinus rhythm. He has been diuresed and appears near euvolemic, feeling back to baseline.

## 2025-03-15 NOTE — CONSULTS
[facility] - Cardiology Stepdown  Cardiology  Consult Note    Patient Name: Danilo Krause  MRN: [id number]  Admission Date: 3/14/2025  Hospital Length of Stay: 1 days  Code Status: Full Code   Attending Provider: Liz Neff MD   Consulting Provider: Nghia Zimmerman MD  Primary Care Physician: Sandy Gastelum MD  Principal Problem:Acute systolic heart failure    Patient information was obtained from patient, past medical records, and ER records.     Consults  Subjective:     Chief Complaint:  AF     HPI:   45 year-old man with recently diagnosed atrial fibrillation who was admitted for acute decompensated heart failure after outpatient JOSEF cardioversion on 3/14. 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 Very physically active without limitations until ~1 month ago when he began to have volume overload symptoms.  Was seen by PCP and multiple urgent cares for her symptoms.  Imaging with edema and  infusions.  Treated for bronchitis and reactive airway disease.  Fortunately patient in his family ran into Dr. Appiah with EP at a softball game.  They told him about patient's symptoms.  He was found to have an irregular rhythm and followed up in clinic with Dr. Appiah.  EKG with atrial fibrillation.  He was then scheduled for an outpatient JOSEF cardioversion.  Successful cardioversion but found to have EF of 15-20% and a concern for acute decompensated heart failure so was admitted to hospital medicine for diuresis and further care    Past Medical History:   Diagnosis Date    Anticoagulant long-term use     atrial fibrillation     Severe obesity (BMI >= 40) 08/22/2020       History reviewed. No pertinent surgical history.    Review of patient's allergies indicates:  No Known Allergies    No current facility-administered medications on file prior to encounter.     Current Outpatient Medications on File Prior to Encounter   Medication Sig    albuterol (PROVENTIL/VENTOLIN HFA) 90 mcg/actuation inhaler Inhale 1 puff into the lungs  every 6 (six) hours as needed.    apixaban (ELIQUIS) 5 mg Tab Take 1 tablet (5 mg total) by mouth 2 (two) times daily.    azelastine (ASTELIN) 137 mcg (0.1 %) nasal spray 1 spray (137 mcg total) by Nasal route 2 (two) times daily.    azithromycin (Z-ARCADIO) 250 MG tablet Take 2 tablets by mouth on day 1; Take 1 tablet by mouth on days 2-5    benzonatate (TESSALON) 100 MG capsule Take 2 capsules (200 mg total) by mouth 3 (three) times daily as needed.    fluticasone propionate (FLONASE) 50 mcg/actuation nasal spray 1 spray (50 mcg total) by Each Nostril route once daily.    guaiFENesin (MUCINEX) 600 mg 12 hr tablet Take 2 tablets (1,200 mg total) by mouth 2 (two) times daily. for 10 days    promethazine-dextromethorphan (PROMETHAZINE-DM) 6.25-15 mg/5 mL Syrp Take 5 mLs by mouth.    cetirizine (ZYRTEC) 10 MG tablet Take 1 tablet (10 mg total) by mouth once daily.     Family History       Problem Relation (Age of Onset)    Atrial fibrillation Father    Hypertension Father          Tobacco Use    Smoking status: Never    Smokeless tobacco: Never   Substance and Sexual Activity    Alcohol use: Yes     Comment: socially    Drug use: Never    Sexual activity: Yes     Partners: Female     Review of Systems   Cardiovascular:  Positive for dyspnea on exertion.     Objective:     Vital Signs (Most Recent):  Temp: 98.6 °F (37 °C) (03/15/25 0731)  Pulse: 76 (03/15/25 0731)  Resp: 18 (03/15/25 0731)  BP: (!) 129/90 (03/15/25 0731)  SpO2: 96 % (03/15/25 0731) Vital Signs (24h Range):  Temp:  [97.7 °F (36.5 °C)-98.6 °F (37 °C)] 98.6 °F (37 °C)  Pulse:  [] 76  Resp:  [18-22] 18  SpO2:  [94 %-98 %] 96 %  BP: (109-136)/() 129/90     Weight: 129.3 kg (285 lb)  Body mass index is 44.64 kg/m².    SpO2: 96 %         Intake/Output Summary (Last 24 hours) at 3/15/2025 0933  Last data filed at 3/15/2025 0612  Gross per 24 hour   Intake 440 ml   Output 1010 ml   Net -570 ml       Lines/Drains/Airways       Peripheral Intravenous Line   Duration                  Peripheral IV - Single Lumen 03/14/25 1348 20 G Left;Posterior Hand <1 day                     Physical Exam  Constitutional:       General: He is not in acute distress.     Appearance: Normal appearance. He is not ill-appearing.   HENT:      Head: Normocephalic and atraumatic.      Nose: No congestion.      Mouth/Throat:      Mouth: Mucous membranes are moist.   Eyes:      Conjunctiva/sclera: Conjunctivae normal.   Cardiovascular:      Rate and Rhythm: Normal rate and regular rhythm.      Pulses: Normal pulses.   Pulmonary:      Effort: Pulmonary effort is normal. No respiratory distress.   Abdominal:      General: Abdomen is flat. There is no distension.      Palpations: Abdomen is soft.   Musculoskeletal:      Cervical back: Normal range of motion.      Right lower leg: No edema.      Left lower leg: No edema.   Skin:     Capillary Refill: Capillary refill takes less than 2 seconds.      Findings: No rash.   Neurological:      Mental Status: He is alert and oriented to person, place, and time.   Psychiatric:         Mood and Affect: Mood normal.          Significant Labs: All pertinent lab results from the last 24 hours have been reviewed.      Assessment and Plan:     * Acute systolic heart failure  45 year old admitted for ADHF after JOSEF/CV. Newly reduced EF and volume overloaded. Great UOP after lasix 20mg IV x 1 yesterday but I&Os not charted. Reports symptoms are much improved, walking around unit with no issues.     Recs  Start valsartan 40 mg BID, first dose now  Continue toprol   Lasix 20mg IV today, discharge home on lasix 20 mg po prn for symptoms or weight gain   Would obtain TTE prior to discharge  Once TTE done can discharge home   Follow up in gen cards and EP clinic, needs referrals           VTE Risk Mitigation (From admission, onward)           Ordered     apixaban tablet 5 mg  2 times daily         03/14/25 8843                      Nghia , MD  Cardiology   [facility] - Cardiology Stepdown

## 2025-03-15 NOTE — ASSESSMENT & PLAN NOTE
46 yo M with no prior cardiac history, admitted with decompensated heart failure with EF 15-20% in the s/o AF RVR. Patient now s/p JOSEF/DCCV yesterday, in sinus rhythm. S/p diuresis and appears near euvolemic, feeling back to baseline.     Recommendations:  - Continue Amiodarone 400mg bid x14d, then 200mg qd with plan to not remain on it long term  - Continue Metop succ 25mg qd  - Continue Eliquis 5mg bid   - TTE pending discharge   - Please place EP follow up outpatient with Dr. Appiah on discharge

## 2025-03-15 NOTE — HPI
45-year-old man with severe obesity, newly diagnosed atrial fibrillation is referred for admission status post EP DC cardioversion for concerns of acute onset systolic heart failure.      Patient reports that for about 3 weeks ago he started experiencing progressively worsening dyspnea on exertion.  No associated chest pain or palpitations reported, he did not feel or denote that he had increased weight gain.  He reports visiting to urgent cares and having a telehealth visit, no EKG was performed at these visits and he states he was not ever told after a cardiac exam that he had an irregular or abnormal heartbeat.  He had findings of orthopnea, as an outpatient did have chest x-ray which showed evidence of pleural effusion and subsequently had a CT chest that showed bilateral pleural effusions.   Diagnosis made when patient's family spoke to Dr. Michael Appiah, they are family friends and in the moment Dr. Appiah noted pt with irregular rapid pulse.  Pt seen in EP clinic this week and ambulatory DC Cardioversion planned for today.   On JOSEF he was found with EF of 20%, EP team performed successful DC Cardioversion today, patient is in sinus rhythm.  He was then referred for admission for management of acute systolic heart failure.     Patient is non-smoker, reports remote alcohol use.  His father and 1 paternal uncle w/ atrial fibrillation.  He reports 1 paternal uncle  @ age 46 presumed heart attack.       He was given a 20 mg IV dose of Lasix in the PACU, reports he has urinated upwards of 8 times but has not been measuring urine output.  Patient counseled to either use urinal or notified nurse prior to dumping any urine output in order for it to be tracked.

## 2025-03-15 NOTE — PLAN OF CARE
Problem: Adult Inpatient Plan of Care  Goal: Plan of Care Review  Outcome: Progressing  Goal: Patient-Specific Goal (Individualized)  Outcome: Progressing  Goal: Absence of Hospital-Acquired Illness or Injury  Outcome: Progressing  Goal: Optimal Comfort and Wellbeing  Outcome: Progressing  Goal: Readiness for Transition of Care  Outcome: Progressing     Problem: Bariatric Environmental Safety  Goal: Safety Maintained with Care  Outcome: Progressing     Problem: Infection  Goal: Absence of Infection Signs and Symptoms  Outcome: Progressing     Problem: Fall Injury Risk  Goal: Absence of Fall and Fall-Related Injury  Outcome: Progressing

## 2025-03-16 ENCOUNTER — PATIENT MESSAGE (OUTPATIENT)
Dept: FAMILY MEDICINE | Facility: CLINIC | Age: 45
End: 2025-03-16
Payer: COMMERCIAL

## 2025-03-17 ENCOUNTER — TELEPHONE (OUTPATIENT)
Dept: ELECTROPHYSIOLOGY | Facility: CLINIC | Age: 45
End: 2025-03-17
Payer: COMMERCIAL

## 2025-03-17 ENCOUNTER — PATIENT OUTREACH (OUTPATIENT)
Dept: ADMINISTRATIVE | Facility: CLINIC | Age: 45
End: 2025-03-17
Payer: COMMERCIAL

## 2025-03-17 ENCOUNTER — PATIENT MESSAGE (OUTPATIENT)
Dept: ADMINISTRATIVE | Facility: CLINIC | Age: 45
End: 2025-03-17
Payer: COMMERCIAL

## 2025-03-17 NOTE — PROGRESS NOTES
C3 nurse attempted to contact Danilo Krause for a TCC post hospital discharge follow up call. No answer. Left voicemail with callback information. The patient has a scheduled HOSFU appointment with Kevin Ortiz on 03/25/25 @ 1000.

## 2025-03-17 NOTE — TELEPHONE ENCOUNTER
----- Message from Michael Appiah MD sent at 3/14/2025  4:50 PM CDT -----  Juan Todd need to see Danilo in follow-up in 4 weeks. Ok to overbook and whatever works for his schedule. Happy to see him even on a lab day prior to starting ([phone]).

## 2025-03-18 ENCOUNTER — TELEPHONE (OUTPATIENT)
Dept: FAMILY MEDICINE | Facility: CLINIC | Age: 45
End: 2025-03-18
Payer: COMMERCIAL

## 2025-03-18 LAB
BSA FOR ECHO PROCEDURE: 2.47 M2
SINUS: 3.4 CM
STJ: 3 CM

## 2025-03-18 NOTE — PROGRESS NOTES
C3 nurse spoke with Danilo Krause's wife for a TCC post hospital discharge follow up call. The patient has a scheduled HOSFU appointment with Kevin Ortiz on 03/25/25 @ 1000.

## 2025-03-18 NOTE — TELEPHONE ENCOUNTER
Spoke with patient about DM, will speak to provider at  upcoming appointment.----- Message from Nurse Celia sent at 3/18/2025  9:18 AM CDT -----  Regarding: Digital medicine for BP & HR monitoring  The pts. Wife Shandra would like information about digital medicine to monitor Danilo's BP & HR.  Can someone please call her concerning this matter?  Thank you!

## 2025-03-19 ENCOUNTER — TELEPHONE (OUTPATIENT)
Dept: CARDIOLOGY | Facility: CLINIC | Age: 45
End: 2025-03-19
Payer: COMMERCIAL

## 2025-03-19 ENCOUNTER — TELEPHONE (OUTPATIENT)
Dept: ELECTROPHYSIOLOGY | Facility: CLINIC | Age: 45
End: 2025-03-19
Payer: COMMERCIAL

## 2025-03-19 RX ORDER — METOPROLOL SUCCINATE 25 MG/1
25 TABLET, EXTENDED RELEASE ORAL 2 TIMES DAILY
Qty: 180 TABLET | Refills: 3 | Status: SHIPPED | OUTPATIENT
Start: 2025-03-19 | End: 2026-03-19

## 2025-03-19 NOTE — ASSESSMENT & PLAN NOTE
He has mild elevation of T.bili that is slightly increased recently, mild ALT elevation and CT abdomen imaging concern for hepatic steatosis, patient at risk for metabolic syndrome given severe obesity.

## 2025-03-19 NOTE — TELEPHONE ENCOUNTER
Heart Failure Transitional Care Clinic    Attempted to call pt to complete Phone enrollment to program. Unable to reach pt at listed phone numbers.  Was able to leave message on voicemail encouraging pt to return call with [facility] phone number. This was our first attempt to contact the pt.      Will continue to try to reach patient.

## 2025-03-19 NOTE — TELEPHONE ENCOUNTER
Spoke with patient to check on him. He is feeling better. Notes he is 15 pounds down. His vital signs show stable BP but HR in the 90s-120s. He got a [employer] mobile device indicating possible Afib. Discussed we will see his ECG at the end of the week. If confirmed then would repeat just a DCCV when he is done the 2 week amiodarone loading period. Defer JOSEF if 100% compliant with eliquis since JOSEF last week. Will increase metoprolol to 25mg bid (new Rx sent). Also will discuss tentatively scheduling a PVI with PFA.

## 2025-03-19 NOTE — DISCHARGE SUMMARY
[facility] - Cardiology [facility] Medicine  Discharge Summary      Patient Name: Danilo Krause  MRN: [id number]  FLAVIA: [id number]  Patient Class: IP- Inpatient  Admission Date: 3/14/2025  Hospital Length of Stay: 1 days  Discharge Date and Time: 3/15/2025  1:55 PM  Attending Physician: No att. providers found   Discharging Provider: Liz Neff MD  Primary Care Provider: Sandy Gastelum MD  [facility] Medicine [facility]: [facility] Liz Neff MD  Primary Care Team: [facility]    HPI:   45-year-old man with severe obesity, newly diagnosed atrial fibrillation is referred for admission status post EP DC cardioversion for concerns of acute onset systolic heart failure.      Patient reports that for about 3 weeks ago he started experiencing progressively worsening dyspnea on exertion.  No associated chest pain or palpitations reported, he did not feel or denote that he had increased weight gain.  He reports visiting to urgent cares and having a telehealth visit, no EKG was performed at these visits and he states he was not ever told after a cardiac exam that he had an irregular or abnormal heartbeat.  He had findings of orthopnea, as an outpatient did have chest x-ray which showed evidence of pleural effusion and subsequently had a CT chest that showed bilateral pleural effusions.   Diagnosis made when patient's family spoke to Dr. Michael Appiah, they are family friends and in the moment Dr. Appiah noted pt with irregular rapid pulse.  Pt seen in EP clinic this week and ambulatory DC Cardioversion planned for today.   On JOSEF he was found with EF of 20%, EP team performed successful DC Cardioversion today, patient is in sinus rhythm.  He was then referred for admission for management of acute systolic heart failure.     Patient is non-smoker, reports remote alcohol use.  His father and 1 paternal uncle w/ atrial fibrillation.  He reports 1 paternal uncle  @ age 46 presumed heart attack.       He was  "given a 20 mg IV dose of Lasix in the PACU, reports he has urinated upwards of 8 times but has not been measuring urine output.  Patient counseled to either use urinal or notified nurse prior to dumping any urine output in order for it to be tracked.     Procedure(s) (LRB):  Cardioversion or Defibrillation (N/A)  Transesophageal echo (JOSEF) intra-procedure log documentation (N/A)      Hospital Course:   Admitted post CV for volume overload. Diuresed. ECHO with HFrEF. Started on GDMT. EP recommend starting amio. Stable for discharge with cards and EP followup. Counseled on HF and lasix titration. F/u HF clinic.      Goals of Care Treatment Preferences:  Code Status: Full Code         Consults:   Consults (From admission, onward)          Status Ordering Provider     Inpatient consult to Electrophysiology  Once        Provider:  (Not yet assigned)    Completed ORIANA WAYNE            Assessment & Plan  Acute systolic heart failure  Patient has Combined Systolic and Diastolic heart failure that is Acute. On presentation their CHF was decompensated. Evidence of decompensated CHF on presentation includes: edema. The etiology of their decompensation is likely new onset . Most recent BNP and echo results are listed below.  No results for input(s): "BNP" in the last 72 hours.  Latest ECHO  Results for orders placed during the hospital encounter of 03/14/25    Echo    Interpretation Summary    Left Ventricle: The left ventricle is mildly dilated. Ventricular mass is normal. Normal wall thickness. Severe global hypokinesis and regional wall motion abnormalities present. There is severely reduced systolic function with a visually estimated ejection fraction of 15 - 20%. Quantitated ejection fraction is 17%. There is diastolic dysfunction.    Right Ventricle: The right ventricle has mild enlargement. Systolic function is mildly reduced.    Left Atrium: Mildly dilated    Right Atrium: Right atrium is mildly dilated.    Mitral " Valve: There is moderate regurgitation.    Tricuspid Valve: There is mild to moderate regurgitation.    Pulmonary Artery: The estimated pulmonary artery systolic pressure is 27 mmHg.    IVC/SVC: Elevated venous pressure at 15 mmHg.    Current Heart Failure Medications  metoprolol succinate (TOPROL-XL) 24 hr tablet, Daily, Oral  furosemide (LASIX) tablet, Daily, Oral  valsartan (DIOVAN) tablet, 2 times daily, Oral    Plan  - Monitor strict I&Os and daily weights.    - Place on telemetry  - Low sodium diet  - Place on fluid restriction of 1.5 L.   - Cardiology has been consulted  - The patient's volume status is at their baseline  - improved edema s/p diuresis, transition to PO lasix, counseled on titration for weight gain/edema- f/u HF clinic  - started GDMT, f/u cards outpt  Severe obesity (BMI >= 40)  Body mass index is 44.64 kg/m². Morbid obesity complicates all aspects of disease management from diagnostic modalities to treatment. Weight loss encouraged and health benefits explained to patient.       New onset atrial fibrillation  Patient has persistent (7 days or more) atrial fibrillation. Patient is currently in sinus rhythm. [id number] Score: 1. The patients heart rate in the last 24 hours is as follows:  No data recorded     Antiarrhythmics  amiodarone (PACERONE) tablet, , Oral  metoprolol succinate (TOPROL-XL) 24 hr tablet, Daily, Oral    Anticoagulants   eliquis    Plan  - Replete lytes with a goal of K>4, Mg >2  - Patient is anticoagulated, see medications listed above.  - Patient's afib is currently controlled  amiodarone 400 mg bid x2 weeks then 200 mg daily, continue eliquis, follow-up with Dr. Appiah as outpatient.       Elevated bilirubin  He has mild elevation of T.bili that is slightly increased recently, mild ALT elevation and CT abdomen imaging concern for hepatic steatosis, patient at risk for metabolic syndrome given severe obesity.         Dyspnea on exertion  As an outpatient - patient is on  cetirizine, recently prescribe astelin nasal spray.  He also was taking course of augmentin for pneumonia, now is on Z-mike azithromycin.     Continue on Astelin, azithromycin to finish his course.   Continue prn cough medications and scheduled.       Final Active Diagnoses:    Diagnosis Date Noted POA    PRINCIPAL PROBLEM:  Acute systolic heart failure [I50.21] 03/14/2025 Yes    Elevated bilirubin [R17] 03/15/2025 Yes    Dyspnea on exertion [R06.09] 03/15/2025 Yes    New onset atrial fibrillation [I48.91] 03/14/2025 Yes    Severe obesity (BMI >= 40) [E66.01] 08/22/2020 Yes     Chronic      Problems Resolved During this Admission:       Discharged Condition: stable    Disposition: Home or Self Care    Follow Up:   Follow-up Information       Sandy Gastelum MD. Schedule an appointment as soon as possible for a visit in 1 week(s).    Specialties: Family Medicine, Wound Care  Contact information:  [address] [facility] [address]  [place] [id number] [phone]                           Patient Instructions:      Ambulatory referral/consult to Cardiac Electrophysiology   Standing Status: Future   Referral Priority: Urgent Referral Type: Consultation   Referral Reason: Specialty Services Required   Referred to Provider: CLAUDIA MON Requested Specialty: Cardiology   Number of Visits Requested: 1     Ambulatory referral/consult to Cardiology   Standing Status: Future   Referral Priority: Urgent Referral Type: Consultation   Referral Reason: Specialty Services Required   Requested Specialty: Cardiology   Number of Visits Requested: 1     Ambulatory referral/consult to Heart Failure Transitional Care Clinic   Standing Status: Future   Referral Priority: Urgent Referral Type: Consultation   Referral Reason: Specialty Services Required   Requested Specialty: Cardiology   Number of Visits Requested: 1     Diet Cardiac     Notify your health care provider if you experience any of the following:  temperature >100.4     Notify your health care  provider if you experience any of the following:  severe uncontrolled pain     Notify your health care provider if you experience any of the following:  redness, tenderness, or signs of infection (pain, swelling, redness, odor or green/yellow discharge around incision site)     Notify your health care provider if you experience any of the following:  difficulty breathing or increased cough     Activity as tolerated       Significant Diagnostic Studies: Labs: All labs within the past 24 hours have been reviewed    Pending Diagnostic Studies:       None           Medications:  Reconciled Home Medications:      Medication List        START taking these medications      amiodarone 200 MG Tab  Commonly known as: PACERONE  Take 2 tablets (400 mg total) by mouth 2 (two) times daily for 14 days, THEN 1 tablet (200 mg total) once daily.  Start taking on: March 15, 2025     furosemide 20 MG tablet  Commonly known as: LASIX  Take 1 tablet (20 mg total) by mouth once daily. Take additional 20-mg tablet for weight gain over 2 lbs/day or 5 lbs/week or Edema.     metoprolol succinate 25 MG 24 hr tablet  Commonly known as: TOPROL-XL  Take 1 tablet (25 mg total) by mouth once daily.     valsartan 40 MG tablet  Commonly known as: DIOVAN  Take 1 tablet (40 mg total) by mouth 2 (two) times daily.            CONTINUE taking these medications      albuterol 90 mcg/actuation inhaler  Commonly known as: PROVENTIL/VENTOLIN HFA  Inhale 1 puff into the lungs every 6 (six) hours as needed.     azelastine 137 mcg (0.1 %) nasal spray  Commonly known as: ASTELIN  1 spray (137 mcg total) by Nasal route 2 (two) times daily.     benzonatate 100 MG capsule  Commonly known as: TESSALON  Take 2 capsules (200 mg total) by mouth 3 (three) times daily as needed.     cetirizine 10 MG tablet  Commonly known as: ZYRTEC  Take 1 tablet (10 mg total) by mouth once daily.     ELIQUIS 5 mg Tab  Generic drug: apixaban  Take 1 tablet (5 mg total) by mouth 2 (two)  times daily.     fluticasone propionate 50 mcg/actuation nasal spray  Commonly known as: FLONASE  1 spray (50 mcg total) by Each Nostril route once daily.     guaiFENesin 600 mg 12 hr tablet  Commonly known as: MUCINEX  Take 2 tablets (1,200 mg total) by mouth 2 (two) times daily. for 10 days     promethazine-dextromethorphan 6.25-15 mg/5 mL Syrp  Commonly known as: PROMETHAZINE-DM  Take 5 mLs by mouth.            ASK your doctor about these medications      azithromycin 250 MG tablet  Commonly known as: Z-ARCADIO  Take 2 tablets by mouth on day 1; Take 1 tablet by mouth on days 2-5  Ask about: Should I take this medication?              Indwelling Lines/Drains at time of discharge:   Lines/Drains/Airways       None                   Time spent on the discharge of patient: 45 minutes of time spent on discharge, including examining the patient, providing discharge instructions, arranging followup and documentation.            Liz Neff MD  Department of Hospital Medicine  [facility] - Cardiology Stepdown

## 2025-03-19 NOTE — HOSPITAL COURSE
Admitted post CV for volume overload. Diuresed. ECHO with HFrEF. Started on GDMT. EP recommend starting amio. Stable for discharge with cards and EP followup. Counseled on HF and lasix titration. F/u HF clinic.

## 2025-03-19 NOTE — ASSESSMENT & PLAN NOTE
"Patient has Combined Systolic and Diastolic heart failure that is Acute. On presentation their CHF was decompensated. Evidence of decompensated CHF on presentation includes: edema. The etiology of their decompensation is likely new onset. Most recent BNP and echo results are listed below.  No results for input(s): "BNP" in the last 72 hours.  Latest ECHO  Results for orders placed during the hospital encounter of 03/14/25    Echo    Interpretation Summary    Left Ventricle: The left ventricle is mildly dilated. Ventricular mass is normal. Normal wall thickness. Severe global hypokinesis and regional wall motion abnormalities present. There is severely reduced systolic function with a visually estimated ejection fraction of 15 - 20%. Quantitated ejection fraction is 17%. There is diastolic dysfunction.    Right Ventricle: The right ventricle has mild enlargement. Systolic function is mildly reduced.    Left Atrium: Mildly dilated    Right Atrium: Right atrium is mildly dilated.    Mitral Valve: There is moderate regurgitation.    Tricuspid Valve: There is mild to moderate regurgitation.    Pulmonary Artery: The estimated pulmonary artery systolic pressure is 27 mmHg.    IVC/SVC: Elevated venous pressure at 15 mmHg.    Current Heart Failure Medications  metoprolol succinate (TOPROL-XL) 24 hr tablet, Daily, Oral  furosemide (LASIX) tablet, Daily, Oral  valsartan (DIOVAN) tablet, 2 times daily, Oral    Plan  - Monitor strict I&Os and daily weights.    - Place on telemetry  - Low sodium diet  - Place on fluid restriction of 1.5 L.   - Cardiology has been consulted  - The patient's volume status is at their baseline  - improved edema s/p diuresis, transition to PO lasix, counseled on titration for weight gain/edema- f/u HF clinic  - started GDMT, f/u cards outpt  "

## 2025-03-19 NOTE — ASSESSMENT & PLAN NOTE
Patient has persistent (7 days or more) atrial fibrillation. Patient is currently in sinus rhythm. [id number] Score: 1. The patients heart rate in the last 24 hours is as follows:  No data recorded     Antiarrhythmics  amiodarone (PACERONE) tablet, , Oral  metoprolol succinate (TOPROL-XL) 24 hr tablet, Daily, Oral    Anticoagulants   eliquis    Plan  - Replete lytes with a goal of K>4, Mg >2  - Patient is anticoagulated, see medications listed above.  - Patient's afib is currently controlled  amiodarone 400 mg bid x2 weeks then 200 mg daily, continue eliquis, follow-up with Dr. Appiah as outpatient.

## 2025-03-21 ENCOUNTER — HOSPITAL ENCOUNTER (OUTPATIENT)
Dept: CARDIOLOGY | Facility: CLINIC | Age: 45
Discharge: HOME | End: 2025-03-21
Payer: COMMERCIAL

## 2025-03-21 DIAGNOSIS — I48.19 PERSISTENT ATRIAL FIBRILLATION: ICD-10-CM

## 2025-03-21 LAB
OHS QRS DURATION: 92 MS
OHS QTC CALCULATION: 490 MS

## 2025-03-21 PROCEDURE — 93010 ELECTROCARDIOGRAM REPORT: CPT | Mod: S$GLB,,, | Performed by: INTERNAL MEDICINE

## 2025-03-21 PROCEDURE — 93005 ELECTROCARDIOGRAM TRACING: CPT | Mod: S$GLB,,, | Performed by: INTERNAL MEDICINE

## 2025-03-24 ENCOUNTER — TELEPHONE (OUTPATIENT)
Dept: ELECTROPHYSIOLOGY | Facility: CLINIC | Age: 45
End: 2025-03-24
Payer: COMMERCIAL

## 2025-03-24 ENCOUNTER — PATIENT MESSAGE (OUTPATIENT)
Dept: ELECTROPHYSIOLOGY | Facility: CLINIC | Age: 45
End: 2025-03-24
Payer: COMMERCIAL

## 2025-03-24 DIAGNOSIS — I48.19 PERSISTENT ATRIAL FIBRILLATION: Primary | ICD-10-CM

## 2025-03-24 NOTE — TELEPHONE ENCOUNTER
Spoke with patient and scheduled procedure: JOSEF/DCCV on 3/28/25. Informed pt that he will need to arrive for 9:30AM and report to 2nd floor lab then proceed to the 3rd floor cardiac short stay area for 10AM.   Informed pt that he will not be permitted to drive for 24 hours due to receiving anesthesia and therefore will need to ensure he has a caregiver/family member available to bring him home post procedure on 3/28/25.   Labs to be done at: STAT PTT on 3/28/25 at 9:30AM  Confirmed that patient is not on GLP-1 agonist  Confirmed pt is taking Eliquis 5mg twice daily and denies missing any doses. Instructed pt to take Eliquis twice daily and NOT miss any doses and to take in the AM day of procedure with water prior to 8AM.   Confirmed that patient does not have any implanted device that has remote or monitor that could cause electrical interference  Instructions will be sent via portal, as requested

## 2025-03-24 NOTE — PROGRESS NOTES
CARDIOLOGY CLINIC VISIT        HISTORY OF PRESENT ILLNESS:     03/25/2025: Danilo Krause is referred by Dr. Aleja Gastelum and Dr. Michael Appiah. He had presented with shortness of breath. Symptoms over the preceding month or so. Prior to that he was very active. Walked daily. 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 He went to urgent care.  Bilateral middle ear effusions and pharyngeal erythema. Treated for bronchitis with steroid injection, anti-histamines and albuterol inhaler. Pulse rate at that visit was 101 bpm. Symptoms did not improve. He went back to an urgent care on 3/8/2025. Had a CXR suggestive of bilateral pulmonary edema. Pulse rate was 91 bpm. Saw Dr. Gastelum who ordered an urgent CT chest.He was to see us in clinic but her ran into Dr. Appiah at a softball game. He was noted to be in an irregular rhythm. Admitted to [facility] [place], [place]. He underwent JOSEF/DCCV. EF on JOSEF was 15-20% prior then roughly 30% post DCCV. Echocardiogram showed EF 15-20%. Mild MARILU. Moderate MR. Discharged home on GDMT. Placed on Amiodarone.    Plans for repeat DCCV on 3/28/2025.  He states that he is feeling much better.  On valsartan.  Recently had his Toprol changed to b.i.d..  Notes that his heart rate is better controlled.  Has not had to increase his Lasix.  He is weighing himself daily.  He is down roughly 17 lb.    CARDIOVASCULAR HISTORY:     Cardiomyopathy  Systolic/diastolic heart failure  Persistent atrial fibrillation    PAST MEDICAL HISTORY:     Past Medical History:   Diagnosis Date    Anticoagulant long-term use     atrial fibrillation     Severe obesity (BMI >= 40) 08/22/2020       PAST SURGICAL HISTORY:     Past Surgical History:   Procedure Laterality Date    ECHOCARDIOGRAM,TRANSESOPHAGEAL N/A 3/14/2025    Procedure: Transesophageal echo (JOSEF) intra-procedure log documentation;  Surgeon: Rachael Pryor MD;  Location: [facility];  Service: Cardiology;  Laterality: N/A;    TREATMENT OF CARDIAC ARRHYTHMIA N/A 3/14/2025    Procedure:  Cardioversion or Defibrillation;  Surgeon: Michael Appiah MD;  Location: [facility];  Service: Cardiology;  Laterality: N/A;  AF, JOSEF, DCCV, [place], [place], 3 Prep       ALLERGIES AND MEDICATION:   Review of patient's allergies indicates:  No Known Allergies     Medication List            Accurate as of March 25, 2025 10:58 AM. If you have any questions, ask your nurse or doctor.                CONTINUE taking these medications      albuterol 90 mcg/actuation inhaler  Commonly known as: PROVENTIL/VENTOLIN HFA     amiodarone 200 MG Tab  Commonly known as: PACERONE  Take 2 tablets (400 mg total) by mouth 2 (two) times daily for 14 days, THEN 1 tablet (200 mg total) once daily.  Start taking on: March 15, 2025     azelastine 137 mcg (0.1 %) nasal spray  Commonly known as: ASTELIN  1 spray (137 mcg total) by Nasal route 2 (two) times daily.     cetirizine 10 MG tablet  Commonly known as: ZYRTEC  Take 1 tablet (10 mg total) by mouth once daily.     ELIQUIS 5 mg Tab  Generic drug: apixaban  Take 1 tablet (5 mg total) by mouth 2 (two) times daily.     fluticasone propionate 50 mcg/actuation nasal spray  Commonly known as: FLONASE  1 spray (50 mcg total) by Each Nostril route once daily.     furosemide 20 MG tablet  Commonly known as: LASIX  Take 1 tablet (20 mg total) by mouth once daily. Take additional 20-mg tablet for weight gain over 2 lbs/day or 5 lbs/week or Edema.     metoprolol succinate 25 MG 24 hr tablet  Commonly known as: TOPROL-XL  Take 1 tablet (25 mg total) by mouth 2 (two) times daily.     promethazine-dextromethorphan 6.25-15 mg/5 mL Syrp  Commonly known as: PROMETHAZINE-DM     valsartan 40 MG tablet  Commonly known as: DIOVAN  Take 1 tablet (40 mg total) by mouth 2 (two) times daily.              SOCIAL HISTORY:   Social History[1]    FAMILY HISTORY:     Family History   Problem Relation Name Age of Onset    Hypertension Father      Atrial fibrillation Father         REVIEW OF SYSTEMS:   Review of Systems  "  Constitutional:  Negative for chills, diaphoresis, fever, malaise/fatigue and weight loss.   HENT:  Negative for congestion, hearing loss, sinus pain, sore throat and tinnitus.    Eyes:  Negative for blurred vision, double vision, photophobia and pain.   Respiratory:  Negative for cough, hemoptysis, sputum production, shortness of breath, wheezing and stridor.    Cardiovascular:  Negative for chest pain, palpitations, orthopnea, claudication, leg swelling and PND.   Gastrointestinal:  Negative for abdominal pain, blood in stool, heartburn, melena, nausea and vomiting.   Musculoskeletal:  Negative for back pain, falls, joint pain, myalgias and neck pain.   Neurological:  Negative for dizziness, tingling, tremors, sensory change, speech change, focal weakness, seizures, loss of consciousness, weakness and headaches.   Endo/Heme/Allergies:  Does not bruise/bleed easily.   Psychiatric/Behavioral:  Negative for depression, memory loss and substance abuse. The patient is not nervous/anxious.        PHYSICAL EXAM:     Vitals:    03/25/25 0957   BP: 124/88   Pulse: 104    Body mass index is 43.4 kg/m².  Weight: 125.7 kg (277 lb 1.9 oz)   Height: 5' 7" (170.2 cm)     Physical Exam  Vitals reviewed.   Constitutional:       General: He is not in acute distress.     Appearance: Normal appearance. He is well-developed. He is obese. He is not diaphoretic.   HENT:      Head: Normocephalic.   Eyes:      Extraocular Movements: Extraocular movements intact.   Neck:      Vascular: No carotid bruit or JVD.   Cardiovascular:      Rate and Rhythm: Normal rate. Rhythm irregularly irregular.      Pulses: Normal pulses.      Heart sounds: Normal heart sounds.   Pulmonary:      Effort: Pulmonary effort is normal.      Breath sounds: Normal breath sounds. No wheezing, rhonchi or rales.   Chest:      Chest wall: No tenderness.   Abdominal:      General: Bowel sounds are normal. There is no distension.      Palpations: Abdomen is soft.      " Tenderness: There is no abdominal tenderness.   Musculoskeletal:      Right lower leg: No edema.      Left lower leg: No edema.   Skin:     General: Skin is warm and dry.      Coloration: Skin is not jaundiced or pale.      Findings: No erythema.   Neurological:      General: No focal deficit present.      Mental Status: He is alert and oriented to person, place, and time.      Motor: No weakness.   Psychiatric:         Speech: Speech normal.         Behavior: Behavior normal.         Thought Content: Thought content normal.         DATA:   EKG: (personally reviewed tracing)  03/21/2025 - atrial fibrillation  Results for orders placed or performed during the hospital encounter of 03/14/25   EKG 12-LEAD    Collection Time: 03/14/25  3:49 PM   Result Value Ref Range    QRS Duration 86 ms    OHS QTC Calculation 472 ms    Narrative    Test Reason : I48.91,    Vent. Rate :  89 BPM     Atrial Rate :  89 BPM     P-R Int : 182 ms          QRS Dur :  86 ms      QT Int : 388 ms       P-R-T Axes :  50  52  39 degrees    QTcB Int : 472 ms    Normal sinus rhythm  Normal ECG  When compared with ECG of 14-Mar-2025 12:22,  Sinus rhythm has replaced Atrial fibrillation  Vent. rate has decreased by  63 bpm  Confirmed by Drake Salazar ([id number]) on 3/15/2025 8:41:11 AM    Referred By: CYDNEY GALLARDO           Confirmed By: Drake Salazar        Laboratory:  CBC:  Recent Labs   Lab 02/21/25 0826 03/14/25 1842   WBC 5.38 6.02   Hemoglobin 14.4 15.5   Hematocrit 43.0 46.1   Platelets 241 234       CHEMISTRIES:  Recent Labs   Lab 02/21/25  0826 03/14/25  1842 03/15/25  0358   Glucose 94 93 79   Sodium 140 142 141   Potassium 4.3 3.9 3.9   BUN 12 12 14   Creatinine 0.9 1.0 0.8   Calcium 8.7 8.6 L 8.3 L   Magnesium  --  1.9 2.0       CARDIAC BIOMARKERS:        COAGS:  Recent Labs   Lab 03/14/25  1232   INR 1.2       LIPIDS/LFTS:  Recent Labs   Lab 02/21/25  0826 03/14/25  1842 03/15/25  0358   Cholesterol 152  --  122   Triglycerides 59  --  54    HDL 37 L  --  28 L   LDL Cholesterol 103.2  --  83.2   Non-HDL Cholesterol 115  --  94   AST 29 29 26   ALT 26 51 H 40       Hemoglobin A1C   Date Value Ref Range Status   03/15/2025 5.0 4.0 - 5.6 % Final     Comment:     ADA Screening Guidelines:  5.7-6.4%  Consistent with prediabetes  >or=6.5%  Consistent with diabetes    High levels of fetal hemoglobin interfere with the HbA1C  assay. Heterozygous hemoglobin variants (HbS, HgC, etc)do  not significantly interfere with this assay.   However, presence of multiple variants may affect accuracy.     02/21/2025 5.1 4.0 - 5.6 % Final     Comment:     ADA Screening Guidelines:  5.7-6.4%  Consistent with prediabetes  >or=6.5%  Consistent with diabetes    High levels of fetal hemoglobin interfere with the HbA1C  assay. Heterozygous hemoglobin variants (HbS, HgC, etc)do  not significantly interfere with this assay.   However, presence of multiple variants may affect accuracy.          The ASCVD Risk score (Turner DK, et al., 2019) failed to calculate for the following reasons:    The valid total cholesterol range is 130 to 320 mg/dL      Cardiovascular Testing:      Transesophageal echo (JOSEF)  Result Date: 3/18/2025    JOSEF performed prior to DCCV; there is no evidence of intracardiac   thrombus.    Left Ventricle: The left ventricle is normal in size. Global   hypokinesis present. There is severely reduced systolic function with a   visually estimated ejection fraction of 15 - 20% during supraventricular   tachycardia to 150bpm. After cardioversion to normal sinus, the EF appears   improved to 25-30%.    Right Ventricle: The right ventricle is normal in size. Systolic   function is mildly reduced.    Left Atrium: dilated The left atrial appendage has a windsock   morphology. Appendage velocity is normal at greater than 40 cm/sec.   Spontaneous filling of echo contrast. There is no thrombus in the left   atrial appendage.    Mitral Valve: There is mild to moderate  regurgitation with a centrally   directed jet.    Tricuspid Valve: There is mild regurgitation.    Aorta: Aortic root is normal in size measuring 3.4 cm. Aortic root at   ST junction is normal.    Pericardium: There is no pericardial effusion.        Echo  Result Date: 3/15/2025    Left Ventricle: The left ventricle is mildly dilated. Ventricular mass   is normal. Normal wall thickness. Severe global hypokinesis and regional   wall motion abnormalities present. There is severely reduced systolic   function with a visually estimated ejection fraction of 15 - 20%.   Quantitated ejection fraction is 17%. There is diastolic dysfunction.    Right Ventricle: The right ventricle has mild enlargement. Systolic   function is mildly reduced.    Left Atrium: Mildly dilated    Right Atrium: Right atrium is mildly dilated.    Mitral Valve: There is moderate regurgitation.    Tricuspid Valve: There is mild to moderate regurgitation.    Pulmonary Artery: The estimated pulmonary artery systolic pressure is   27 mmHg.    IVC/SVC: Elevated venous pressure at 15 mmHg.       Cardioversion 03/14/2025:      Cardioversion was unsuccessful at 200J energy without restoration of normal sinus rhythm.    Cardioversion was successfully performed with 400J energy with restoration of normal sinus rhythm.    No cardiac monitor results found for the past 12 months         ASSESSMENT:     Cardiomyopathy  Acute Systolic/diastolic heart failure  Persistent Atrial fibrillation  Obesity    PLAN:     Cardiomyopathy:  Likely due to tachyarrhythmia.  Symptomatically improved.  At some point will do ischemic evaluation.  However optimizing medications, GDMT is priority.  Would like to change to Entresto but will not do so since he is going for procedure later this week.  I will see him back next week and add Entresto in place of valsartan.  Will also add Jardiance at that time.  Systolic/diastolic heart failure: Continue current management.  Persistent  atrial fibrillation:  Plans for cardioversion 03/28/2025 by Dr. Appiah.  Heart rate has improved on amiodarone and with increase in Toprol. He has had discussions with the patient that he will likely need an ablation.    Obesity:  Will refer to Pulmonary for sleep apnea evaluation.  Return to clinic 1 week.           Kevin Ortiz MD, MPH, [facility], [facility]         [1]   Social History  Socioeconomic History    Marital status:    Tobacco Use    Smoking status: Never    Smokeless tobacco: Never   Substance and Sexual Activity    Alcohol use: Yes     Comment: socially    Drug use: Never    Sexual activity: Yes     Partners: Female   Social History Narrative    ** Merged History Encounter **          Social Drivers of Health     Financial Resource Strain: Low Risk  (3/11/2025)    Overall Financial Resource Strain (CARDIA)     Difficulty of Paying Living Expenses: Not hard at all   Food Insecurity: No Food Insecurity (3/11/2025)    Hunger Vital Sign     Worried About Running Out of Food in the Last Year: Never true     Ran Out of Food in the Last Year: Never true   Transportation Needs: No Transportation Needs (3/11/2025)    PRAPARE - Transportation     Lack of Transportation (Medical): No     Lack of Transportation (Non-Medical): No   Physical Activity: Insufficiently Active (3/11/2025)    Exercise Vital Sign     Days of Exercise per Week: 3 days     Minutes of Exercise per Session: 30 min   Stress: No Stress Concern Present (3/11/2025)    [employer] - Occupational Stress Questionnaire     Feeling of Stress : Only a little   Housing Stability: Low Risk  (3/11/2025)    Housing Stability Vital Sign     Unable to Pay for Housing in the Last Year: No     Number of Times Moved in the Last Year: 0     Homeless in the Last Year: No

## 2025-03-25 ENCOUNTER — OFFICE VISIT (OUTPATIENT)
Dept: CARDIOLOGY | Facility: CLINIC | Age: 45
End: 2025-03-25
Payer: COMMERCIAL

## 2025-03-25 VITALS
HEART RATE: 104 BPM | BODY MASS INDEX: 43.49 KG/M2 | OXYGEN SATURATION: 99 % | WEIGHT: 277.13 LBS | DIASTOLIC BLOOD PRESSURE: 88 MMHG | HEIGHT: 67 IN | SYSTOLIC BLOOD PRESSURE: 124 MMHG

## 2025-03-25 DIAGNOSIS — Z79.01 ANTICOAGULANT LONG-TERM USE: ICD-10-CM

## 2025-03-25 DIAGNOSIS — I48.19 PERSISTENT ATRIAL FIBRILLATION: ICD-10-CM

## 2025-03-25 DIAGNOSIS — I48.91 NEW ONSET ATRIAL FIBRILLATION: ICD-10-CM

## 2025-03-25 DIAGNOSIS — I50.41 ACUTE COMBINED SYSTOLIC AND DIASTOLIC HEART FAILURE: Primary | ICD-10-CM

## 2025-03-25 DIAGNOSIS — R06.02 SHORT OF BREATH ON EXERTION: ICD-10-CM

## 2025-03-25 DIAGNOSIS — E66.01 SEVERE OBESITY (BMI >= 40): Chronic | ICD-10-CM

## 2025-03-25 PROCEDURE — 99999 PR PBB SHADOW E&M-EST. PATIENT-LVL V: CPT | Mod: PBBFAC,,, | Performed by: INTERNAL MEDICINE

## 2025-03-26 ENCOUNTER — PATIENT MESSAGE (OUTPATIENT)
Dept: FAMILY MEDICINE | Facility: CLINIC | Age: 45
End: 2025-03-26
Payer: COMMERCIAL

## 2025-03-27 ENCOUNTER — TELEPHONE (OUTPATIENT)
Dept: ELECTROPHYSIOLOGY | Facility: CLINIC | Age: 45
End: 2025-03-27
Payer: COMMERCIAL

## 2025-03-27 DIAGNOSIS — I48.19 PERSISTENT ATRIAL FIBRILLATION: Primary | ICD-10-CM

## 2025-03-27 NOTE — TELEPHONE ENCOUNTER
Spoke to patient.    CONFIRMED procedure arrival time of 9:30AM for lab work, instructed pt to report to 2nd floor lab then proceed to 3rd floor cardiac short stay for 10AM.    Reiterated instructions including:  -Directions to check in desk  -NPO after midnight night prior to procedure.  -Confirmed compliance of Eliquis. Pt confirmed that he takes Eliquis twice daily and denies missing any doses. Instructed to take Eliquis in the AM with water prior to arrival.   -Pre-procedure LABS reviewed. Ca+ 8.3 on 3/15/25. Pt will have a STAT PTT and Ca+ prior to procedure.   -Confirmed absence of implanted device/stimulator reviewed.   -Confirmed no fever, cough, or shortness of breath in the past 30 days  -Pt confirmed that he will have a caregiver available to bring him home post procedure.   Patient verbalized understanding of above and appreciated the call.

## 2025-03-27 NOTE — HPI
Danilo Krause is a 45 y.o. male with atrial fibrillation.     History obtained from previous visits as well as through patient report.    Background:     From last office visit with Dr. Appiah on 3/14/2025.     He is a pleasant 45 year-old man with newly diagnosed atrial fibrillation with RVR.     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 He began noticing progressive dyspnea on exertion, cough, fatigue and then orthopnea. Went to urgent care and was treated for sinus/bronchitis. Patient continued with symptoms and his PCP was concern for new onset HF. CT chest was ordered and he was referred to Dr. Ortiz in cardiology. CT chest noted bilateral pleural effusions and he was started on Augmentin. He saw Dr. Appiah in clinic on 3/14/2025 with ECG documenting AF with RVR (ventricular rate 160s). Dr. Appiah scheduled him for an urgent JOSEF/DCCV.     3/14/2025: He urgently underwent JOSEF/DCCV. He was admitted to the hospital for acute HF. Received IV diuresis and initiated on amiodarone and metoprolol. His symptoms improved and he was discharged home.     3/21/2025: ECG showed AF,  bpm. Plan to repeat DCCV. Defer JOSEF if 100% compliant with eliquis since JOSEF on 3/14/2025. Metoprolol increased to 25mg bid. Dr. Appiah also discuss tentatively scheduling a PVI with PFA with patient on 3/19/2025.     3/25/2025: Saw Dr. Ortiz. Plan to continue to optimize GDMT. Will plan to start Entresto and Jardiance after DCCV. Will continue BB and valsartan for now.     Interval Hx:     Danilo Krause presents today to [facility] for scheduled DCCV with Dr. Appiah. He denies any chest pain, palpitations, SOB, RODGERS, dizziness, light headedness, weakness, syncope, or near syncopal episodes. He denies any bleeding, infections, fevers, rashes, or surgeries in the past 30 days. He is currently taking Eliquis. Last dose of Eliquis was on 3/28/2025 AM.    ECG today shows AF at 101 bpm     Pertinent labs reviewed from 3/28/2025: PTT  28.8    Transesophageal echo (JOSEF)  Result Date: 3/18/2025    JOSEF performed prior to DCCV; there is no evidence of intracardiac   thrombus.    Left Ventricle: The left ventricle is normal in size. Global   hypokinesis present. There is severely reduced systolic function with a   visually estimated ejection fraction of 15 - 20% during supraventricular   tachycardia to 150bpm. After cardioversion to normal sinus, the EF appears   improved to 25-30%.    Right Ventricle: The right ventricle is normal in size. Systolic   function is mildly reduced.    Left Atrium: dilated The left atrial appendage has a windsock   morphology. Appendage velocity is normal at greater than 40 cm/sec.   Spontaneous filling of echo contrast. There is no thrombus in the left   atrial appendage.    Mitral Valve: There is mild to moderate regurgitation with a centrally   directed jet.    Tricuspid Valve: There is mild regurgitation.    Aorta: Aortic root is normal in size measuring 3.4 cm. Aortic root at   ST junction is normal.    Pericardium: There is no pericardial effusion.        Echo  Result Date: 3/15/2025    Left Ventricle: The left ventricle is mildly dilated. Ventricular mass   is normal. Normal wall thickness. Severe global hypokinesis and regional   wall motion abnormalities present. There is severely reduced systolic   function with a visually estimated ejection fraction of 15 - 20%.   Quantitated ejection fraction is 17%. There is diastolic dysfunction.    Right Ventricle: The right ventricle has mild enlargement. Systolic   function is mildly reduced.    Left Atrium: Mildly dilated    Right Atrium: Right atrium is mildly dilated.    Mitral Valve: There is moderate regurgitation.    Tricuspid Valve: There is mild to moderate regurgitation.    Pulmonary Artery: The estimated pulmonary artery systolic pressure is   27 mmHg.    IVC/SVC: Elevated venous pressure at 15 mmHg.            Persistent AF    Plan:   -DCCV  -Anesthesia for  sedation     Prior to procedure, extensive discussion with patient regarding risks and benefits of DCCV with Dr. Appiah at bedside today. The patient voices understanding, all questions have been answered, and patient would like to proceed. No further questions/concerns voiced at this time. Consents signed.

## 2025-03-28 ENCOUNTER — ANESTHESIA (OUTPATIENT)
Dept: MEDSURG UNIT | Facility: HOSPITAL | Age: 45
End: 2025-03-28
Payer: COMMERCIAL

## 2025-03-28 ENCOUNTER — PATIENT MESSAGE (OUTPATIENT)
Dept: ELECTROPHYSIOLOGY | Facility: CLINIC | Age: 45
End: 2025-03-28
Payer: COMMERCIAL

## 2025-03-28 ENCOUNTER — ANESTHESIA EVENT (OUTPATIENT)
Dept: MEDSURG UNIT | Facility: HOSPITAL | Age: 45
End: 2025-03-28
Payer: COMMERCIAL

## 2025-03-28 ENCOUNTER — HOSPITAL ENCOUNTER (OUTPATIENT)
Facility: HOSPITAL | Age: 45
Discharge: HOME | End: 2025-03-28
Attending: INTERNAL MEDICINE | Admitting: INTERNAL MEDICINE
Payer: COMMERCIAL

## 2025-03-28 VITALS
SYSTOLIC BLOOD PRESSURE: 92 MMHG | WEIGHT: 271 LBS | BODY MASS INDEX: 42.53 KG/M2 | HEIGHT: 67 IN | OXYGEN SATURATION: 98 % | HEART RATE: 64 BPM | RESPIRATION RATE: 20 BRPM | DIASTOLIC BLOOD PRESSURE: 63 MMHG | TEMPERATURE: 98 F

## 2025-03-28 DIAGNOSIS — I48.19 PERSISTENT ATRIAL FIBRILLATION: ICD-10-CM

## 2025-03-28 DIAGNOSIS — I48.91 ATRIAL FIBRILLATION: ICD-10-CM

## 2025-03-28 LAB
OHS QRS DURATION: 80 MS
OHS QRS DURATION: 84 MS
OHS QTC CALCULATION: 484 MS
OHS QTC CALCULATION: 491 MS

## 2025-03-28 PROCEDURE — 93010 ELECTROCARDIOGRAM REPORT: CPT | Mod: 76,,, | Performed by: INTERNAL MEDICINE

## 2025-03-28 PROCEDURE — 25000003 PHARM REV CODE 250: Performed by: NURSE ANESTHETIST, CERTIFIED REGISTERED

## 2025-03-28 PROCEDURE — 93005 ELECTROCARDIOGRAM TRACING: CPT

## 2025-03-28 PROCEDURE — 63600175 PHARM REV CODE 636 W HCPCS: Performed by: NURSE ANESTHETIST, CERTIFIED REGISTERED

## 2025-03-28 PROCEDURE — 37000008 HC ANESTHESIA 1ST 15 MINUTES: Performed by: INTERNAL MEDICINE

## 2025-03-28 PROCEDURE — 37000009 HC ANESTHESIA EA ADD 15 MINS: Performed by: INTERNAL MEDICINE

## 2025-03-28 PROCEDURE — 92960 CARDIOVERSION ELECTRIC EXT: CPT | Performed by: INTERNAL MEDICINE

## 2025-03-28 PROCEDURE — 92960 CARDIOVERSION ELECTRIC EXT: CPT | Mod: ,,, | Performed by: INTERNAL MEDICINE

## 2025-03-28 PROCEDURE — 93010 ELECTROCARDIOGRAM REPORT: CPT | Mod: ,,, | Performed by: INTERNAL MEDICINE

## 2025-03-28 RX ORDER — PROPOFOL 10 MG/ML
VIAL (ML) INTRAVENOUS
Status: DISCONTINUED | OUTPATIENT
Start: 2025-03-28 | End: 2025-03-28

## 2025-03-28 RX ORDER — SILVER SULFADIAZINE 10 G/1000G
CREAM TOPICAL 2 TIMES DAILY PRN
Status: DISCONTINUED | OUTPATIENT
Start: 2025-03-28 | End: 2025-03-28 | Stop reason: HOSPADM

## 2025-03-28 RX ORDER — PROCHLORPERAZINE EDISYLATE 5 MG/ML
5 INJECTION INTRAMUSCULAR; INTRAVENOUS EVERY 30 MIN PRN
OUTPATIENT
Start: 2025-03-28

## 2025-03-28 RX ORDER — AMIODARONE HYDROCHLORIDE 200 MG/1
200 TABLET ORAL DAILY
Start: 2025-03-29 | End: 2026-03-29

## 2025-03-28 RX ORDER — LIDOCAINE HYDROCHLORIDE 20 MG/ML
INJECTION INTRAVENOUS
Status: DISCONTINUED | OUTPATIENT
Start: 2025-03-28 | End: 2025-03-28

## 2025-03-28 RX ORDER — FENTANYL CITRATE 50 UG/ML
25 INJECTION, SOLUTION INTRAMUSCULAR; INTRAVENOUS EVERY 5 MIN PRN
Refills: 0 | OUTPATIENT
Start: 2025-03-28

## 2025-03-28 RX ORDER — ONDANSETRON HYDROCHLORIDE 2 MG/ML
4 INJECTION, SOLUTION INTRAVENOUS ONCE AS NEEDED
OUTPATIENT
Start: 2025-03-28 | End: 2036-08-24

## 2025-03-28 RX ORDER — DIPHENHYDRAMINE HYDROCHLORIDE 50 MG/ML
25 INJECTION, SOLUTION INTRAMUSCULAR; INTRAVENOUS EVERY 6 HOURS PRN
OUTPATIENT
Start: 2025-03-28

## 2025-03-28 RX ADMIN — PROPOFOL 50 MG: 10 INJECTION, EMULSION INTRAVENOUS at 12:03

## 2025-03-28 RX ADMIN — PROPOFOL 20 MG: 10 INJECTION, EMULSION INTRAVENOUS at 12:03

## 2025-03-28 RX ADMIN — LIDOCAINE HYDROCHLORIDE 100 MG: 20 INJECTION INTRAVENOUS at 12:03

## 2025-03-28 RX ADMIN — SODIUM CHLORIDE: 9 INJECTION, SOLUTION INTRAVENOUS at 12:03

## 2025-03-28 NOTE — H&P
[facility] - Short Stay Cardiac Unit  Cardiology  History and Physical     Patient Name: Danilo Krause  MRN: [id number]  Admission Date: 3/28/2025  Code Status: Prior   Attending Provider: Michael Appiah MD   Primary Care Physician: Sandy Gastelum MD  Principal Problem:New onset atrial fibrillation    Patient information was obtained from patient and past medical records.     Subjective:     Chief Complaint:  Atrial fibrillation     HPI:  Danilo Krause is a 45 y.o. male with atrial fibrillation.     History obtained from previous visits as well as through patient report.    Background:     From last office visit with Dr. Appiah on 3/14/2025.     He is a pleasant 45 year-old man with newly diagnosed atrial fibrillation with RVR.     He runs a lawn/gardening business. He began noticing progressive dyspnea on exertion, cough, fatigue and then orthopnea. Went to urgent care and was treated for sinus/bronchitis. Patient continued with symptoms and his PCP was concern for new onset HF. CT chest was ordered and he was referred to Dr. Ortiz in cardiology. CT chest noted bilateral pleural effusions and he was started on Augmentin. He saw Dr. Appiah in clinic on 3/14/2025 with ECG documenting AF with RVR (ventricular rate 160s). Dr. Appiah scheduled him for an urgent JOSEF/DCCV.     3/14/2025: He urgently underwent JOSEF/DCCV. He was admitted to the hospital for acute HF. Received IV diuresis and initiated on amiodarone and metoprolol. His symptoms improved and he was discharged home.     3/21/2025: ECG showed AF,  bpm. Plan to repeat DCCV. Defer JOSEF if 100% compliant with eliquis since JOSEF on 3/14/2025. Metoprolol increased to 25mg bid. Dr. Appiah also discuss tentatively scheduling a PVI with PFA with patient on 3/19/2025.     3/25/2025: Saw Dr. Ortiz. Plan to continue to optimize GDMT. Will plan to start Entresto and Jardiance after DCCV. Will continue BB and valsartan for now.     Interval Hx:     Danilo Juan  Nelida presents today to [facility] for scheduled DCCV with Dr. Appiah. He denies any chest pain, palpitations, SOB, RODGERS, dizziness, light headedness, weakness, syncope, or near syncopal episodes. He denies any bleeding, infections, fevers, rashes, or surgeries in the past 30 days. He is currently taking Eliquis. Last dose of Eliquis was on 3/28/2025 AM.    ECG today shows AF at 101 bpm     Pertinent labs reviewed from 3/28/2025: PTT 28.8    Transesophageal echo (JOSEF)  Result Date: 3/18/2025    JOSEF performed prior to DCCV; there is no evidence of intracardiac   thrombus.    Left Ventricle: The left ventricle is normal in size. Global   hypokinesis present. There is severely reduced systolic function with a   visually estimated ejection fraction of 15 - 20% during supraventricular   tachycardia to 150bpm. After cardioversion to normal sinus, the EF appears   improved to 25-30%.    Right Ventricle: The right ventricle is normal in size. Systolic   function is mildly reduced.    Left Atrium: dilated The left atrial appendage has a windsock   morphology. Appendage velocity is normal at greater than 40 cm/sec.   Spontaneous filling of echo contrast. There is no thrombus in the left   atrial appendage.    Mitral Valve: There is mild to moderate regurgitation with a centrally   directed jet.    Tricuspid Valve: There is mild regurgitation.    Aorta: Aortic root is normal in size measuring 3.4 cm. Aortic root at   ST junction is normal.    Pericardium: There is no pericardial effusion.    Echo  Result Date: 3/15/2025    Left Ventricle: The left ventricle is mildly dilated. Ventricular mass   is normal. Normal wall thickness. Severe global hypokinesis and regional   wall motion abnormalities present. There is severely reduced systolic   function with a visually estimated ejection fraction of 15 - 20%.   Quantitated ejection fraction is 17%. There is diastolic dysfunction.    Right Ventricle: The right ventricle has mild  enlargement. Systolic   function is mildly reduced.    Left Atrium: Mildly dilated    Right Atrium: Right atrium is mildly dilated.    Mitral Valve: There is moderate regurgitation.    Tricuspid Valve: There is mild to moderate regurgitation.    Pulmonary Artery: The estimated pulmonary artery systolic pressure is   27 mmHg.    IVC/SVC: Elevated venous pressure at 15 mmHg.      Past Medical History:   Diagnosis Date    Anticoagulant long-term use     atrial fibrillation     Severe obesity (BMI >= 40) 08/22/2020       Past Surgical History:   Procedure Laterality Date    ECHOCARDIOGRAM,TRANSESOPHAGEAL N/A 3/14/2025    Procedure: Transesophageal echo (JOSEF) intra-procedure log documentation;  Surgeon: Rachael Pryor MD;  Location: [facility] EP LAB;  Service: Cardiology;  Laterality: N/A;    TREATMENT OF CARDIAC ARRHYTHMIA N/A 3/14/2025    Procedure: Cardioversion or Defibrillation;  Surgeon: Michael Appiah MD;  Location: [facility] EP LAB;  Service: Cardiology;  Laterality: N/A;  AF, JOSEF, DCCV, MAC, MN, 3 Prep       Review of patient's allergies indicates:  No Known Allergies    No current facility-administered medications on file prior to encounter.     Current Outpatient Medications on File Prior to Encounter   Medication Sig    albuterol (PROVENTIL/VENTOLIN HFA) 90 mcg/actuation inhaler Inhale 1 puff into the lungs every 6 (six) hours as needed. (Patient not taking: Reported on 3/25/2025)    amiodarone (PACERONE) 200 MG Tab Take 2 tablets (400 mg total) by mouth 2 (two) times daily for 14 days, THEN 1 tablet (200 mg total) once daily.    apixaban (ELIQUIS) 5 mg Tab Take 1 tablet (5 mg total) by mouth 2 (two) times daily.    azelastine (ASTELIN) 137 mcg (0.1 %) nasal spray 1 spray (137 mcg total) by Nasal route 2 (two) times daily.    cetirizine (ZYRTEC) 10 MG tablet Take 1 tablet (10 mg total) by mouth once daily.    fluticasone propionate (FLONASE) 50 mcg/actuation nasal spray 1 spray (50 mcg total) by Each Nostril route  "once daily. (Patient not taking: Reported on 3/25/2025)    furosemide (LASIX) 20 MG tablet Take 1 tablet (20 mg total) by mouth once daily. Take additional 20-mg tablet for weight gain over 2 lbs/day or 5 lbs/week or Edema.    metoprolol succinate (TOPROL-XL) 25 MG 24 hr tablet Take 1 tablet (25 mg total) by mouth 2 (two) times daily.    promethazine-dextromethorphan (PROMETHAZINE-DM) 6.25-15 mg/5 mL Syrp Take 5 mLs by mouth. (Patient not taking: Reported on 3/25/2025)    valsartan (DIOVAN) 40 MG tablet Take 1 tablet (40 mg total) by mouth 2 (two) times daily.     Family History       Problem Relation (Age of Onset)    Atrial fibrillation Father    Hypertension Father          Tobacco Use    Smoking status: Never    Smokeless tobacco: Never   Substance and Sexual Activity    Alcohol use: Yes     Comment: socially    Drug use: Never    Sexual activity: Yes     Partners: Female     Review of Systems   Constitutional: Negative. Negative for chills, fever and malaise/fatigue.   HENT: Negative for nosebleeds  Cardiovascular:  Negative for chest pain, dyspnea on exertion, leg swelling, palpitations and syncope.   Respiratory:  Negative for shortness of breath.    Hematologic/Lymphatic: Does not bruise/bleed easily.   Skin:  Negative for itching and rash.   Gastrointestinal:  Negative for abdominal pain and nausea.   Genitourinary:  Negative for hematuria.   Neurological:  Negative for dizziness and light-headedness.   Psychiatric/Behavioral: Negative.     All other systems reviewed and are negative.    Objective:     Vital Signs (Most Recent):  Vitals:    03/28/25 1015   BP: (!) 140/85   BP Location: Right arm   Patient Position: Lying   Pulse: 95   Resp: 18   Temp: 97.7 °F (36.5 °C)   TempSrc: Temporal   SpO2: 97%   Weight: 122.9 kg (271 lb)   Height: 5' 7" (1.702 m)      Vital Signs (24h Range):        Physical Exam  Vitals reviewed.   Constitutional:       General: He is not in acute distress.     Appearance: Normal " appearance.   HENT:      Mouth/Throat:      Mouth: Mucous membranes are moist.   Eyes:      Extraocular Movements: Extraocular movements intact.   Cardiovascular:      Rate and Rhythm: Normal rate. Rhythm irregular.   Pulmonary:      Effort: Pulmonary effort is normal. No respiratory distress.      Breath sounds: No wheezing or rales.   Abdominal:      General: There is no distension.      Palpations: Abdomen is soft.   Musculoskeletal:         General: Normal range of motion.      Cervical back: Normal range of motion.   Skin:     General: Skin is warm.   Neurological:      General: No focal deficit present.      Mental Status: He is alert.   Psychiatric:         Mood and Affect: Mood normal.         Behavior: Behavior normal.         Thought Content: Thought content normal.         Judgment: Judgment normal.     Significant Labs: Pertinent pre-procedure labs reviewed    Significant Imaging: ECG  Assessment and Plan:     Atrial fibrillation     Plan:   -DCCV  -Anesthesia for sedation     Prior to procedure, extensive discussion with patient regarding risks and benefits of DCCV with Dr. Appiah at bedside today. The patient voices understanding, all questions have been answered, and patient would like to proceed. No further questions/concerns voiced at this time. Consents signed.     BECK Downing  Cardiology   [facility] - Short Stay Cardiac Unit    Attending: Dr. Michael Appiah

## 2025-03-28 NOTE — DISCHARGE SUMMARY
[facility] - [facility]iology  Cardiology  Discharge Summary      Patient Name: Danilo Krause  MRN: [id number]  Admission Date: 3/28/2025  Hospital Length of Stay: 0 days  Discharge Date and Time:  03/28/2025 1:20 PM  Attending Physician: Michael Appiah MD    Discharging Provider: BECK Downing  Primary Care Physician: Sandy Gastelum MD    HPI:   Danilo Krause is a 45 y.o. male with atrial fibrillation, acute systolic HF/CMP.      History obtained from previous visits as well as through patient report.    Background:     From last office visit with Dr. Appiah on 3/14/2025.     He is a pleasant 45 year-old man with newly diagnosed atrial fibrillation with RVR.     He runs a lawn/gardening business. He began noticing progressive dyspnea on exertion, cough, fatigue and then orthopnea. Went to urgent care and was treated for sinus/bronchitis. Patient continued with symptoms and his PCP was concern for new onset HF. CT chest was ordered and he was referred to Dr. Ortiz in cardiology. CT chest noted bilateral pleural effusions and he was started on Augmentin. He saw Dr. Appiah in clinic on 3/14/2025 with ECG documenting AF with RVR (ventricular rate 160s). Dr. Appiah scheduled him for an urgent JOSEF/DCCV.     3/14/2025: He urgently underwent JOSEF/DCCV. He was admitted to the hospital for acute HF. Received IV diuresis and initiated on amiodarone and metoprolol. His symptoms improved and he was discharged home.     3/21/2025: ECG showed AF,  bpm. Plan to repeat DCCV. Defer JOSEF if 100% compliant with eliquis since JOSEF on 3/14/2025. Metoprolol increased to 25mg bid. Dr. Appiah also discuss tentatively scheduling a PVI with PFA with patient on 3/19/2025.     3/25/2025: Saw Dr. Ortiz. Plan to continue to optimize GDMT. Will plan to start Entresto and Jardiance after DCCV. Will continue BB and valsartan for now.     Interval Hx:     Danilo Krause presents today to [facility] for scheduled DCCV with Dr. Appiah. He  denies any chest pain, palpitations, SOB, RODGERS, dizziness, light headedness, weakness, syncope, or near syncopal episodes. He denies any bleeding, infections, fevers, rashes, or surgeries in the past 30 days. He is currently taking Eliquis. Last dose of Eliquis was on 3/28/2025 AM.    ECG today shows AF at 101 bpm     Pertinent labs reviewed from 3/28/2025: PTT 28.8, Calcium 9.1    Transesophageal echo (JOSEF)  Result Date: 3/18/2025    JOSEF performed prior to DCCV; there is no evidence of intracardiac   thrombus.    Left Ventricle: The left ventricle is normal in size. Global   hypokinesis present. There is severely reduced systolic function with a   visually estimated ejection fraction of 15 - 20% during supraventricular   tachycardia to 150bpm. After cardioversion to normal sinus, the EF appears   improved to 25-30%.    Right Ventricle: The right ventricle is normal in size. Systolic   function is mildly reduced.    Left Atrium: dilated The left atrial appendage has a windsock   morphology. Appendage velocity is normal at greater than 40 cm/sec.   Spontaneous filling of echo contrast. There is no thrombus in the left   atrial appendage.    Mitral Valve: There is mild to moderate regurgitation with a centrally   directed jet.    Tricuspid Valve: There is mild regurgitation.    Aorta: Aortic root is normal in size measuring 3.4 cm. Aortic root at   ST junction is normal.    Pericardium: There is no pericardial effusion.    Echo  Result Date: 3/15/2025    Left Ventricle: The left ventricle is mildly dilated. Ventricular mass   is normal. Normal wall thickness. Severe global hypokinesis and regional   wall motion abnormalities present. There is severely reduced systolic   function with a visually estimated ejection fraction of 15 - 20%.   Quantitated ejection fraction is 17%. There is diastolic dysfunction.    Right Ventricle: The right ventricle has mild enlargement. Systolic   function is mildly reduced.    Left  Atrium: Mildly dilated    Right Atrium: Right atrium is mildly dilated.    Mitral Valve: There is moderate regurgitation.    Tricuspid Valve: There is mild to moderate regurgitation.    Pulmonary Artery: The estimated pulmonary artery systolic pressure is   27 mmHg.    IVC/SVC: Elevated venous pressure at 15 mmHg.      Procedure(s) (LRB):  Cardioversion or Defibrillation (N/A)     Indwelling Lines/Drains at time of discharge:  None    Hospital Course:  Mr. Krause presented in atrial fibrillation and currently on amiodarone and ELiquis. He underwent DCCV 400 J x 1 shock, with successful restoration of normal sinus rhythm. He tolerated the procedure without any acute complications. Post-DCCV ECG revealed sinus rhythm at 65 bpm  ms QRS 80 ms QT/Qtc 466/484 ms. Plan to continue all home medications. He will continue amiodarone 400 mg BID today and decrease dose to 200 mg daily starting tomorrow. Continue Eliquis 5 mg BID and metoprolol succinate 25 mg BID. Instructed to return in 1 week for follow up ECG and in 4 weeks for clinic follow up with Dr. Appiah.     He was assessed at bedside prior to discharge. He reported feeling well and denied chest discomfort, shortness of breath, palpitations, lightheadedness, or any other acute symptoms. Discharge instructions were discussed with patient and all questions were answered. Patient stable for discharge home.     Goals of Care Treatment Preferences:  Code Status: Full Code    Significant Diagnostic Studies:   Electrophysiology Procedure  Result Date: 3/28/2025    Cardioversion was successfully performed with restoration of normal sinus rhythm. I certify that I was present for the critical steps of the procedure including the diagnostic, surgical and/or interventional portions. Procedure Log documented by No documenter listed and verified by Michael Appiah MD. Date: 3/28/2025  Time: 1:09 PM    Discharged Condition: stable    Disposition: Home or Self Care    Follow  Up:   Follow-up Information       [facility] Follow up.    Specialty: Cardiology  Why: Post cardioversion ECG at 7:30 AM  Contact information:  [address]  [facility] [address] [phone]  Additional information:  [facility] - Main Building, [facility] [address]   Please park in [facility] and use [facility] elevator             Michael Appiah MD Follow up on 4/21/2025.    Specialties: Electrophysiology, Cardiology  Why: post cardioversion follow up at 7:40 AM  Contact information:  [address]  [facility] [address]  [phone]                           Patient Instructions:      No driving until:   Order Comments: No driving for at least 24 hours     Notify your health care provider if you experience any of the following:  temperature >100.4     Notify your health care provider if you experience any of the following:  persistent nausea and vomiting or diarrhea     Notify your health care provider if you experience any of the following:  severe uncontrolled pain     Notify your health care provider if you experience any of the following:  redness, tenderness, or signs of infection (pain, swelling, redness, odor or green/yellow discharge around incision site)     Notify your health care provider if you experience any of the following:  difficulty breathing or increased cough     Notify your health care provider if you experience any of the following:  severe persistent headache     Notify your health care provider if you experience any of the following:  worsening rash     Notify your health care provider if you experience any of the following:  persistent dizziness, light-headedness, or visual disturbances     Notify your health care provider if you experience any of the following:  increased confusion or weakness     Activity as tolerated     Medications:  Reconciled Home Medications:      Medication List        CHANGE how you take these medications       amiodarone 200 MG Tab  Commonly known as: PACERONE  Take 1 tablet (200 mg total) by mouth once daily.  Start taking on: March 29, 2025  What changed: See the new instructions.            CONTINUE taking these medications      albuterol 90 mcg/actuation inhaler  Commonly known as: PROVENTIL/VENTOLIN HFA  Inhale 1 puff into the lungs every 6 (six) hours as needed.     azelastine 137 mcg (0.1 %) nasal spray  Commonly known as: ASTELIN  1 spray (137 mcg total) by Nasal route 2 (two) times daily.     cetirizine 10 MG tablet  Commonly known as: ZYRTEC  Take 1 tablet (10 mg total) by mouth once daily.     ELIQUIS 5 mg Tab  Generic drug: apixaban  Take 1 tablet (5 mg total) by mouth 2 (two) times daily.     fluticasone propionate 50 mcg/actuation nasal spray  Commonly known as: FLONASE  1 spray (50 mcg total) by Each Nostril route once daily.     furosemide 20 MG tablet  Commonly known as: LASIX  Take 1 tablet (20 mg total) by mouth once daily. Take additional 20-mg tablet for weight gain over 2 lbs/day or 5 lbs/week or Edema.     metoprolol succinate 25 MG 24 hr tablet  Commonly known as: TOPROL-XL  Take 1 tablet (25 mg total) by mouth 2 (two) times daily.     promethazine-dextromethorphan 6.25-15 mg/5 mL Syrp  Commonly known as: PROMETHAZINE-DM  Take 5 mLs by mouth.     valsartan 40 MG tablet  Commonly known as: DIOVAN  Take 1 tablet (40 mg total) by mouth 2 (two) times daily.            Plan:   -Continue amiodarone 400 mg BID today, decrease to 200 mg daily starting tomorrow.   -Continue Eliquis 5 mg BID  -Continue metoprolol succinate 25 mg BID. He will call with any symptoms of bradycardia  -ECG in one week (4/4/2025)  -Follow up with Dr. Appiah in one month (4/21/2025).   -Call with any issues.     Time spent on the discharge of patient: 25 minutes    BECK Downing  Cardiology  [facility] - Cardiology    Attending: Dr. Mcihael Appiah

## 2025-03-28 NOTE — TRANSFER OF CARE
"Anesthesia Transfer of Care Note    Patient: Danilo Krause    Procedure(s) Performed: Procedure(s) (LRB):  Cardioversion or Defibrillation (N/A)    Patient location: PACU    Anesthesia Type: general    Transport from OR: Transported from OR on 6-10 L/min O2 by face mask with adequate spontaneous ventilation    Post pain: adequate analgesia    Post assessment: no apparent anesthetic complications and tolerated procedure well    Post vital signs: stable    Level of consciousness: awake    Nausea/Vomiting: no nausea/vomiting    Complications: none    Transfer of care protocol was followed      Last vitals: Visit Vitals  BP (!) 131/91 (BP Location: Left arm, Patient Position: Sitting)   Pulse 95   Temp 36.5 °C (97.7 °F) (Temporal)   Resp 18   Ht 5' 7" (1.702 m)   Wt 122.9 kg (271 lb)   SpO2 97%   BMI 42.44 kg/m²     "

## 2025-03-28 NOTE — PLAN OF CARE
Patient discharged per MD orders. Instructions given on medications, skin care, activity, when to call MD, and follow up appointments. Pt verbalized understanding.  Patient AAOx4, VSS, no c/o pain or discomfort at this time. Patient was able to ambulate in the yao without difficulty and tolerating juice. Telemetry and PIV removed. Patient left unit via wheelchair with transport and his dad.

## 2025-03-28 NOTE — PLAN OF CARE
Received report from Alfonzo DUARTE EP and CRNA. Pt is s/p cardioversion. Pt is aaox4. Vss. Resp even and unlabored. Pt placed on continuous bedside monitoring. Bed locked and in low position. Side rails raised x 2. Post procedure protocol reviewed with patient. Understanding verbalized. RN at bedside. Nurse call bell within reach. Will monitor

## 2025-03-28 NOTE — PLAN OF CARE
Pt arrived to unit accompanied by his family.  Pre op orders and assessment initiated.  Pt remains npo.  Call bell within reach.

## 2025-03-28 NOTE — ANESTHESIA POSTPROCEDURE EVALUATION
Anesthesia Post Evaluation    Patient: Danilo Krause    Procedure(s) Performed: Procedure(s) (LRB):  Cardioversion or Defibrillation (N/A)    Final Anesthesia Type: general      Patient participation: Yes- Able to Participate  Level of consciousness: awake and alert  Post-procedure vital signs: reviewed and stable  Pain control: Pain has been treated.  Airway patency: patent    PONV status: Absent or treated.  Anesthetic complications: no      Cardiovascular status: hemodynamically stable  Respiratory status: unassisted  Hydration status: euvolemic  Follow-up not needed.              Vitals Value Taken Time   BP 97/65 03/28/25 13:19   Temp 36.7 °C (98.1 °F) 03/28/25 12:21   Pulse 67 03/28/25 13:22   Resp 25 03/28/25 13:21   SpO2 99 % 03/28/25 13:22   Vitals shown include unfiled device data.      No case tracking events are documented in the log.      Pain/Zulma Score: No data recorded

## 2025-03-28 NOTE — ANESTHESIA PREPROCEDURE EVALUATION
03/28/2025  Danilo Krause is a 45 y.o., male.      Pre-op Assessment    I have reviewed the Patient Summary Reports.       I have reviewed the Medications.     Review of Systems  Anesthesia Hx:  No problems with previous Anesthesia               Denies Personal Hx of Anesthesia complications.                    Cardiovascular:            CHF                Shortness of Breath       Congestive Heart Failure (CHF)                  Atrial Fibrillation     Pulmonary:      Shortness of breath                      Physical Exam    Airway:  No airway management difficulties anticipated  Dental:  No active dental issues noted  Chest/Lungs:  Clear to auscultation    Heart:  Rate: Normal  Rhythm: Regular Rhythm  Sounds: Normal        Anesthesia Plan  Type of Anesthesia, risks & benefits discussed:    Anesthesia Type: Gen Natural Airway  Informed Consent: Informed consent signed with the Patient and all parties understand the risks and agree with anesthesia plan.  All questions answered.   ASA Score: 3  Anesthesia Plan Notes: Chart reviewed. Patient seen and examined. Anesthesia plan discussed and questions answered. E-consent signed. Jean Paul King MD    Ready For Surgery From Anesthesia Perspective.     .

## 2025-03-28 NOTE — DISCHARGE INSTRUCTIONS
Medications:  -Continue to take your home medications as listed on your medication list after you are discharged.    Diet  -You may resume oral intake after you are discharged, as long you have no swallowing difficulties.    Because you have received sedation for this procedure:  -Limit activity for the remainder of the day.  -Do not smoke for at least 6 hours and until you are fully awake and alert.  -Do not drink alcoholic beverage for 24 hours.  -Do not drive for 24 hours.  -Defer important decision making until the following day.     Go to the Emergency Department if you develop:   -Bleeding  -Weakness or numbness  -Visual, gait or speech disturbance  -New chest pain, palpitations, shortness of breath, rapid heart beat, or fainting  -Fever    Follow up:  -EKG in 1 week.  -Dr. Michael Appiah  in 1 month.     Any need to reschedule or cancel procedures, or any questions regarding your procedures should be addressed directly with the Arrhythmia Department Nurses at the following phone number: [phone].

## 2025-03-28 NOTE — SUBJECTIVE & OBJECTIVE
Past Medical History:   Diagnosis Date    Anticoagulant long-term use     atrial fibrillation     Severe obesity (BMI >= 40) 08/22/2020       Past Surgical History:   Procedure Laterality Date    ECHOCARDIOGRAM,TRANSESOPHAGEAL N/A 3/14/2025    Procedure: Transesophageal echo (JOSEF) intra-procedure log documentation;  Surgeon: Rachael Pryor MD;  Location: [facility] EP LAB;  Service: Cardiology;  Laterality: N/A;    TREATMENT OF CARDIAC ARRHYTHMIA N/A 3/14/2025    Procedure: Cardioversion or Defibrillation;  Surgeon: Michael Appiah MD;  Location: [facility] EP LAB;  Service: Cardiology;  Laterality: N/A;  AF, JOSEF, DCCV, MAC, IN, 3 Prep       Review of patient's allergies indicates:  No Known Allergies    No current facility-administered medications on file prior to encounter.     Current Outpatient Medications on File Prior to Encounter   Medication Sig    albuterol (PROVENTIL/VENTOLIN HFA) 90 mcg/actuation inhaler Inhale 1 puff into the lungs every 6 (six) hours as needed. (Patient not taking: Reported on 3/25/2025)    amiodarone (PACERONE) 200 MG Tab Take 2 tablets (400 mg total) by mouth 2 (two) times daily for 14 days, THEN 1 tablet (200 mg total) once daily.    apixaban (ELIQUIS) 5 mg Tab Take 1 tablet (5 mg total) by mouth 2 (two) times daily.    azelastine (ASTELIN) 137 mcg (0.1 %) nasal spray 1 spray (137 mcg total) by Nasal route 2 (two) times daily.    cetirizine (ZYRTEC) 10 MG tablet Take 1 tablet (10 mg total) by mouth once daily.    fluticasone propionate (FLONASE) 50 mcg/actuation nasal spray 1 spray (50 mcg total) by Each Nostril route once daily. (Patient not taking: Reported on 3/25/2025)    furosemide (LASIX) 20 MG tablet Take 1 tablet (20 mg total) by mouth once daily. Take additional 20-mg tablet for weight gain over 2 lbs/day or 5 lbs/week or Edema.    metoprolol succinate (TOPROL-XL) 25 MG 24 hr tablet Take 1 tablet (25 mg total) by mouth 2 (two) times daily.    promethazine-dextromethorphan  (PROMETHAZINE-DM) 6.25-15 mg/5 mL Syrp Take 5 mLs by mouth. (Patient not taking: Reported on 3/25/2025)    valsartan (DIOVAN) 40 MG tablet Take 1 tablet (40 mg total) by mouth 2 (two) times daily.     Family History       Problem Relation (Age of Onset)    Atrial fibrillation Father    Hypertension Father          Tobacco Use    Smoking status: Never    Smokeless tobacco: Never   Substance and Sexual Activity    Alcohol use: Yes     Comment: socially    Drug use: Never    Sexual activity: Yes     Partners: Female     Review of Systems   Constitutional: Negative. Negative for chills, fever and malaise/fatigue.   HENT:  Positive for nosebleeds.    Cardiovascular:  Negative for chest pain, dyspnea on exertion, leg swelling, palpitations and syncope.   Respiratory:  Negative for shortness of breath.    Hematologic/Lymphatic: Does not bruise/bleed easily.   Skin:  Negative for itching and rash.   Gastrointestinal:  Negative for abdominal pain and nausea.   Genitourinary:  Negative for hematuria.   Neurological:  Negative for dizziness and light-headedness.   Psychiatric/Behavioral: Negative.     All other systems reviewed and are negative.    Objective:     Vital Signs (Most Recent):    Vital Signs (24h Range):           There is no height or weight on file to calculate BMI.            No intake or output data in the 24 hours ending 03/28/25 1016    Lines/Drains/Airways       None                    Physical Exam  Vitals reviewed.   Constitutional:       General: He is not in acute distress.     Appearance: Normal appearance.   HENT:      Mouth/Throat:      Mouth: Mucous membranes are moist.   Eyes:      Extraocular Movements: Extraocular movements intact.   Cardiovascular:      Rate and Rhythm: Normal rate. Rhythm irregular.   Pulmonary:      Effort: Pulmonary effort is normal. No respiratory distress.      Breath sounds: No wheezing or rales.   Abdominal:      General: There is no distension.      Palpations: Abdomen  is soft.   Musculoskeletal:         General: Normal range of motion.      Cervical back: Normal range of motion.   Skin:     General: Skin is warm.   Neurological:      General: No focal deficit present.      Mental Status: He is alert.   Psychiatric:         Mood and Affect: Mood normal.         Behavior: Behavior normal.         Thought Content: Thought content normal.         Judgment: Judgment normal.          Significant Labs: Pertinent pre-procedure labs reviewed    Significant Imaging: ECG

## 2025-03-30 ENCOUNTER — PATIENT MESSAGE (OUTPATIENT)
Dept: CARDIOLOGY | Facility: CLINIC | Age: 45
End: 2025-03-30
Payer: COMMERCIAL

## 2025-03-31 ENCOUNTER — TELEPHONE (OUTPATIENT)
Dept: ELECTROPHYSIOLOGY | Facility: CLINIC | Age: 45
End: 2025-03-31
Payer: COMMERCIAL

## 2025-03-31 NOTE — TELEPHONE ENCOUNTER
Spoke with patient regarding his concern he is back in AF per his home Kardia device starting Saturday evening (3/29/2025). Feels ok. BP is normal and HR is in the 80s. He is seeing Dr. Ortiz tomorrow at the [facility] cardiology clinic. Would like for him to get an ECG for confirmation.    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 Next step recommendations would be either PVI at his convenience or repeat DCCV after a few more weeks of amiodarone. He elects for PVI. Discussed dates. Have April 10th and May 16th available. He would favor May 16th so he can get things lined up for his work.    He also indicated he has 3 blisters on his left arm and is unsure how they occurred. Requested he send me a picture.

## 2025-04-01 ENCOUNTER — PATIENT MESSAGE (OUTPATIENT)
Dept: ELECTROPHYSIOLOGY | Facility: CLINIC | Age: 45
End: 2025-04-01
Payer: COMMERCIAL

## 2025-04-01 ENCOUNTER — OFFICE VISIT (OUTPATIENT)
Dept: CARDIOLOGY | Facility: CLINIC | Age: 45
End: 2025-04-01
Payer: COMMERCIAL

## 2025-04-01 ENCOUNTER — TELEPHONE (OUTPATIENT)
Dept: CARDIOLOGY | Facility: CLINIC | Age: 45
End: 2025-04-01
Payer: COMMERCIAL

## 2025-04-01 ENCOUNTER — TELEPHONE (OUTPATIENT)
Dept: ELECTROPHYSIOLOGY | Facility: CLINIC | Age: 45
End: 2025-04-01
Payer: COMMERCIAL

## 2025-04-01 VITALS
DIASTOLIC BLOOD PRESSURE: 60 MMHG | SYSTOLIC BLOOD PRESSURE: 100 MMHG | WEIGHT: 274.94 LBS | HEART RATE: 93 BPM | HEIGHT: 67 IN | BODY MASS INDEX: 43.15 KG/M2 | RESPIRATION RATE: 16 BRPM | OXYGEN SATURATION: 98 %

## 2025-04-01 DIAGNOSIS — I48.19 PERSISTENT ATRIAL FIBRILLATION: Primary | ICD-10-CM

## 2025-04-01 DIAGNOSIS — I42.9 CARDIOMYOPATHY, UNSPECIFIED TYPE: ICD-10-CM

## 2025-04-01 DIAGNOSIS — E66.01 SEVERE OBESITY (BMI >= 40): Chronic | ICD-10-CM

## 2025-04-01 DIAGNOSIS — Z01.818 PRE-OP TESTING: ICD-10-CM

## 2025-04-01 DIAGNOSIS — I50.42 CHRONIC COMBINED SYSTOLIC AND DIASTOLIC HEART FAILURE: ICD-10-CM

## 2025-04-01 DIAGNOSIS — Z79.01 ANTICOAGULANT LONG-TERM USE: ICD-10-CM

## 2025-04-01 DIAGNOSIS — L13.9 BULLOUS DERMATITIS: ICD-10-CM

## 2025-04-01 DIAGNOSIS — Z79.01 CHRONIC ANTICOAGULATION: ICD-10-CM

## 2025-04-01 PROCEDURE — 99214 OFFICE O/P EST MOD 30 MIN: CPT | Mod: S$GLB,,, | Performed by: INTERNAL MEDICINE

## 2025-04-01 PROCEDURE — 1160F RVW MEDS BY RX/DR IN RCRD: CPT | Mod: CPTII,S$GLB,, | Performed by: INTERNAL MEDICINE

## 2025-04-01 PROCEDURE — 3044F HG A1C LEVEL LT 7.0%: CPT | Mod: CPTII,S$GLB,, | Performed by: INTERNAL MEDICINE

## 2025-04-01 PROCEDURE — 3078F DIAST BP <80 MM HG: CPT | Mod: CPTII,S$GLB,, | Performed by: INTERNAL MEDICINE

## 2025-04-01 PROCEDURE — 99999 PR PBB SHADOW E&M-EST. PATIENT-LVL IV: CPT | Mod: PBBFAC,,, | Performed by: INTERNAL MEDICINE

## 2025-04-01 PROCEDURE — 4010F ACE/ARB THERAPY RXD/TAKEN: CPT | Mod: CPTII,S$GLB,, | Performed by: INTERNAL MEDICINE

## 2025-04-01 PROCEDURE — 1159F MED LIST DOCD IN RCRD: CPT | Mod: CPTII,S$GLB,, | Performed by: INTERNAL MEDICINE

## 2025-04-01 PROCEDURE — 1111F DSCHRG MED/CURRENT MED MERGE: CPT | Mod: CPTII,S$GLB,, | Performed by: INTERNAL MEDICINE

## 2025-04-01 PROCEDURE — 3074F SYST BP LT 130 MM HG: CPT | Mod: CPTII,S$GLB,, | Performed by: INTERNAL MEDICINE

## 2025-04-01 PROCEDURE — 3008F BODY MASS INDEX DOCD: CPT | Mod: CPTII,S$GLB,, | Performed by: INTERNAL MEDICINE

## 2025-04-01 RX ORDER — DAPAGLIFLOZIN 5 MG/1
5 TABLET, FILM COATED ORAL DAILY
Qty: 30 TABLET | Refills: 11 | Status: ACTIVE | OUTPATIENT
Start: 2025-04-01 | End: 2026-04-01

## 2025-04-01 RX ORDER — SACUBITRIL AND VALSARTAN 24; 26 MG/1; MG/1
1 TABLET, FILM COATED ORAL 2 TIMES DAILY
Qty: 60 TABLET | Refills: 11 | Status: ACTIVE | OUTPATIENT
Start: 2025-04-01 | End: 2026-04-01

## 2025-04-01 NOTE — TELEPHONE ENCOUNTER
Spoke with Patient . Scheduled for Cardiac Ablation procedure on 5/16/2025 with Dr Appiah. Procedure details reviewed and advised that pre procedure patient instructions will be sent via patient portal as requested.  Patient was seen by Gen cardiologist, Dr Ortiz and noted to have blisters possibly related to Amiodarone. Dr Appiah aware.  Patient instructed  to stop Amiodarone and notify Dr Appiah promptly if any more blisters appear. Advised to call the office for any questions or concerns prior to scheduled procedure. Understanding verbalized.

## 2025-04-01 NOTE — TELEPHONE ENCOUNTER
[facility]    Attempted to call pt to complete Phone enrollment to program. Unable to reach pt at listed phone numbers.  Was able to leave message on voicemail encouraging pt to return call with [facility] phone number. This was our second attempt to enroll the pt.  [facility] was also offered to the pt.     Will continue to try to reach patient.

## 2025-04-01 NOTE — PROGRESS NOTES
CARDIOLOGY CLINIC VISIT        HISTORY OF PRESENT ILLNESS:     03/25/2025: Danilo Krasue is referred by Dr. Aleja Gastelum and Dr. Michael Appiah. He had presented with shortness of breath. Symptoms over the preceding month or so. Prior to that he was very active. Walked daily. 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 He went to urgent care.  Bilateral middle ear effusions and pharyngeal erythema. Treated for bronchitis with steroid injection, anti-histamines and albuterol inhaler. Pulse rate at that visit was 101 bpm. Symptoms did not improve. He went back to an urgent care on 3/8/2025. Had a CXR suggestive of bilateral pulmonary edema. Pulse rate was 91 bpm. Saw Dr. Gastelum who ordered an urgent CT chest.He was to see us in clinic but her ran into Dr. Appiah at a softball game. He was noted to be in an irregular rhythm. Admitted to [facility]. He underwent JOSEF/DCCV. EF on JOSEF was 15-20% prior then roughly 30% post DCCV. Echocardiogram showed EF 15-20%. Mild MARILU. Moderate MR. Discharged home on GDMT. Placed on Amiodarone.    Plans for repeat DCCV on 3/28/2025.  He states that he is feeling much better.  On valsartan.  Recently had his Toprol changed to b.i.d..  Notes that his heart rate is better controlled.  Has not had to increase his Lasix.  He is weighing himself daily.  He is down roughly 17 lb.    04/01/2025:  Underwent cardioversion last week. Back in atrial fibrillation. No chest pain or shortness of breath. No pnd or orthopnea. LUE developed some blisters that have since drained. Do not look infected.     CARDIOVASCULAR HISTORY:     Cardiomyopathy  Systolic/diastolic heart failure  Persistent atrial fibrillation    PAST MEDICAL HISTORY:     Past Medical History:   Diagnosis Date    Anticoagulant long-term use     atrial fibrillation     CHF (congestive heart failure)     Severe obesity (BMI >= 40) 08/22/2020       PAST SURGICAL HISTORY:     Past Surgical History:   Procedure Laterality Date    ECHOCARDIOGRAM,TRANSESOPHAGEAL  N/A 3/14/2025    Procedure: Transesophageal echo (JOSEF) intra-procedure log documentation;  Surgeon: Rachael Pryor MD;  Location: [facility] EP LAB;  Service: Cardiology;  Laterality: N/A;    TREATMENT OF CARDIAC ARRHYTHMIA N/A 3/14/2025    Procedure: Cardioversion or Defibrillation;  Surgeon: Michael Appiah MD;  Location: [facility] EP LAB;  Service: Cardiology;  Laterality: N/A;  AF, JOSEF, DCCV, MAC, AR, 3 Prep    TREATMENT OF CARDIAC ARRHYTHMIA N/A 3/28/2025    Procedure: Cardioversion or Defibrillation;  Surgeon: Michael Appiah MD;  Location: [facility] EP LAB;  Service: Cardiology;  Laterality: N/A;  AF, JOSEF (cx if 100% OAC compliant), DCCV, MAC, AR, 3 Prep       ALLERGIES AND MEDICATION:   Review of patient's allergies indicates:  No Known Allergies     Medication List            Accurate as of April 1, 2025  9:19 AM. If you have any questions, ask your nurse or doctor.                START taking these medications      dapagliflozin propanediol 5 mg Tab tablet  Commonly known as: FARXIGA  Take 1 tablet (5 mg total) by mouth once daily.  Started by: Kevin Ortiz MD     ENTRESTO 24-26 mg per tablet  Generic drug: sacubitriL-valsartan  Take 1 tablet by mouth 2 (two) times daily.  Started by: Kevin Ortiz MD            CONTINUE taking these medications      albuterol 90 mcg/actuation inhaler  Commonly known as: PROVENTIL/VENTOLIN HFA     amiodarone 200 MG Tab  Commonly known as: PACERONE  Take 1 tablet (200 mg total) by mouth once daily.     azelastine 137 mcg (0.1 %) nasal spray  Commonly known as: ASTELIN  1 spray (137 mcg total) by Nasal route 2 (two) times daily.     cetirizine 10 MG tablet  Commonly known as: ZYRTEC  Take 1 tablet (10 mg total) by mouth once daily.     ELIQUIS 5 mg Tab  Generic drug: apixaban  Take 1 tablet (5 mg total) by mouth 2 (two) times daily.     fluticasone propionate 50 mcg/actuation nasal spray  Commonly known as: FLONASE  1 spray (50 mcg total) by Each Nostril route once daily.      furosemide 20 MG tablet  Commonly known as: LASIX  Take 1 tablet (20 mg total) by mouth once daily. Take additional 20-mg tablet for weight gain over 2 lbs/day or 5 lbs/week or Edema.     metoprolol succinate 25 MG 24 hr tablet  Commonly known as: TOPROL-XL  Take 1 tablet (25 mg total) by mouth 2 (two) times daily.     promethazine-dextromethorphan 6.25-15 mg/5 mL Syrp  Commonly known as: PROMETHAZINE-DM            STOP taking these medications      valsartan 40 MG tablet  Commonly known as: DIOVAN  Stopped by: Kevin Ortiz MD               Where to Get Your Medications        These medications were sent to [facility]  [address], [place] [address]      Hours: Mon-Fri, 8a-5:30p Phone: [phone]   dapagliflozin propanediol 5 mg Tab tablet  ENTRESTO 24-26 mg per tablet         SOCIAL HISTORY:   Social History[1]    FAMILY HISTORY:     Family History   Problem Relation Name Age of Onset    Hypertension Father      Atrial fibrillation Father         REVIEW OF SYSTEMS:   Review of Systems   Constitutional:  Negative for chills, diaphoresis, fever, malaise/fatigue and weight loss.   HENT:  Negative for congestion, hearing loss, sinus pain, sore throat and tinnitus.    Eyes:  Negative for blurred vision, double vision, photophobia and pain.   Respiratory:  Negative for cough, hemoptysis, sputum production, shortness of breath, wheezing and stridor.    Cardiovascular:  Negative for chest pain, palpitations, orthopnea, claudication, leg swelling and PND.   Gastrointestinal:  Negative for abdominal pain, blood in stool, heartburn, melena, nausea and vomiting.   Musculoskeletal:  Negative for back pain, falls, joint pain, myalgias and neck pain.   Neurological:  Negative for dizziness, tingling, tremors, sensory change, speech change, focal weakness, seizures, loss of consciousness, weakness and headaches.   Endo/Heme/Allergies:  Does not bruise/bleed easily.   Psychiatric/Behavioral:   "Negative for depression, memory loss and substance abuse. The patient is not nervous/anxious.        PHYSICAL EXAM:     Vitals:    04/01/25 0850   BP: 100/60   Pulse: 93   Resp: 16      Body mass index is 43.06 kg/m².  Weight: 124.7 kg (274 lb 14.6 oz)   Height: 5' 7" (170.2 cm)     Physical Exam  Vitals reviewed.   Constitutional:       General: He is not in acute distress.     Appearance: Normal appearance. He is well-developed. He is obese. He is not diaphoretic.   HENT:      Head: Normocephalic.   Eyes:      Extraocular Movements: Extraocular movements intact.   Neck:      Vascular: No carotid bruit or JVD.   Cardiovascular:      Rate and Rhythm: Normal rate. Rhythm irregularly irregular.      Pulses: Normal pulses.      Heart sounds: Normal heart sounds.   Pulmonary:      Effort: Pulmonary effort is normal.      Breath sounds: Normal breath sounds. No wheezing, rhonchi or rales.   Chest:      Chest wall: No tenderness.   Abdominal:      General: Bowel sounds are normal. There is no distension.      Palpations: Abdomen is soft.      Tenderness: There is no abdominal tenderness.   Musculoskeletal:      Right lower leg: No edema.      Left lower leg: No edema.   Skin:     General: Skin is warm and dry.      Coloration: Skin is not jaundiced or pale.      Findings: No erythema.   Neurological:      General: No focal deficit present.      Mental Status: He is alert and oriented to person, place, and time.      Motor: No weakness.   Psychiatric:         Speech: Speech normal.         Behavior: Behavior normal.         Thought Content: Thought content normal.         DATA:   EKG: (personally reviewed tracing)  03/21/2025 - atrial fibrillation  Results for orders placed or performed during the hospital encounter of 03/28/25   EKG 12-LEAD    Collection Time: 03/28/25 12:52 PM   Result Value Ref Range    QRS Duration 80 ms    [facility] QTC Calculation 484 ms    Narrative    Test Reason : I48.91,    Vent. Rate :  65 BPM     " Atrial Rate :  65 BPM     P-R Int : 196 ms          QRS Dur :  80 ms      QT Int : 466 ms       P-R-T Axes :  40  71  41 degrees    QTcB Int : 484 ms    Normal sinus rhythm  Prolonged QT  Abnormal ECG  When compared with ECG of 28-Mar-2025 10:12,  Sinus rhythm has replaced Atrial fibrillation  Anterior forces are higher  Confirmed by Guy Bardales ([id number]) on 3/28/2025 12:56:51 PM    Referred By: CLAUDIA MON           Confirmed By: Guy Bardales        Laboratory:  CBC:  Recent Labs   Lab 02/21/25 0826 03/14/25 1842   WBC 5.38 6.02   Hemoglobin 14.4 15.5   Hematocrit 43.0 46.1   Platelets 241 234       CHEMISTRIES:  Recent Labs   Lab 02/21/25  0826 03/14/25  1842 03/15/25  0358 03/28/25  0917   Glucose 94 93 79  --    Sodium 140 142 141  --    Potassium 4.3 3.9 3.9  --    BUN 12 12 14  --    Creatinine 0.9 1.0 0.8  --    Calcium 8.7 8.6 L 8.3 L 9.1   Magnesium  --  1.9 2.0  --        CARDIAC BIOMARKERS:        COAGS:  Recent Labs   Lab 03/14/25  1232   INR 1.2       LIPIDS/LFTS:  Recent Labs   Lab 02/21/25  0826 03/14/25  1842 03/15/25  0358   Cholesterol 152  --  122   Triglycerides 59  --  54   HDL 37 L  --  28 L   LDL Cholesterol 103.2  --  83.2   Non-HDL Cholesterol 115  --  94   AST 29 29 26   ALT 26 51 H 40       Hemoglobin A1C   Date Value Ref Range Status   03/15/2025 5.0 4.0 - 5.6 % Final     Comment:     ADA Screening Guidelines:  5.7-6.4%  Consistent with prediabetes  >or=6.5%  Consistent with diabetes    High levels of fetal hemoglobin interfere with the HbA1C  assay. Heterozygous hemoglobin variants (HbS, HgC, etc)do  not significantly interfere with this assay.   However, presence of multiple variants may affect accuracy.     02/21/2025 5.1 4.0 - 5.6 % Final     Comment:     ADA Screening Guidelines:  5.7-6.4%  Consistent with prediabetes  >or=6.5%  Consistent with diabetes    High levels of fetal hemoglobin interfere with the HbA1C  assay. Heterozygous hemoglobin variants (HbS, HgC, etc)do  not  significantly interfere with this assay.   However, presence of multiple variants may affect accuracy.          The ASCVD Risk score (Turner DK, et al., 2019) failed to calculate for the following reasons:    The valid total cholesterol range is 130 to 320 mg/dL      Cardiovascular Testing:      Transesophageal echo (JOSEF)  Result Date: 3/18/2025    JOSEF performed prior to DCCV; there is no evidence of intracardiac   thrombus.    Left Ventricle: The left ventricle is normal in size. Global   hypokinesis present. There is severely reduced systolic function with a   visually estimated ejection fraction of 15 - 20% during supraventricular   tachycardia to 150bpm. After cardioversion to normal sinus, the EF appears   improved to 25-30%.    Right Ventricle: The right ventricle is normal in size. Systolic   function is mildly reduced.    Left Atrium: dilated The left atrial appendage has a windsock   morphology. Appendage velocity is normal at greater than 40 cm/sec.   Spontaneous filling of echo contrast. There is no thrombus in the left   atrial appendage.    Mitral Valve: There is mild to moderate regurgitation with a centrally   directed jet.    Tricuspid Valve: There is mild regurgitation.    Aorta: Aortic root is normal in size measuring 3.4 cm. Aortic root at   ST junction is normal.    Pericardium: There is no pericardial effusion.        Echo  Result Date: 3/15/2025    Left Ventricle: The left ventricle is mildly dilated. Ventricular mass   is normal. Normal wall thickness. Severe global hypokinesis and regional   wall motion abnormalities present. There is severely reduced systolic   function with a visually estimated ejection fraction of 15 - 20%.   Quantitated ejection fraction is 17%. There is diastolic dysfunction.    Right Ventricle: The right ventricle has mild enlargement. Systolic   function is mildly reduced.    Left Atrium: Mildly dilated    Right Atrium: Right atrium is mildly dilated.    Mitral Valve:  There is moderate regurgitation.    Tricuspid Valve: There is mild to moderate regurgitation.    Pulmonary Artery: The estimated pulmonary artery systolic pressure is   27 mmHg.    IVC/SVC: Elevated venous pressure at 15 mmHg.       Cardioversion 03/14/2025:      Cardioversion was unsuccessful at 200J energy without restoration of normal sinus rhythm.    Cardioversion was successfully performed with 400J energy with restoration of normal sinus rhythm.    No cardiac monitor results found for the past 12 months         ASSESSMENT:     Cardiomyopathy  Acute Systolic/diastolic heart failure  Persistent Atrial fibrillation  Chronic anticoagulation  Bullous dermatitis  Obesity    PLAN:     Cardiomyopathy:  Will try to change to Entresto from Valsartan. Add Farxiga.  Jardiance was roughly 8 dollars per day.  Will send prescriptions to specialty pharmacy.  May not be able to make changes secondary to cost.  Nuclear stress to assess for ischemia.  Likely secondary to tachyarrhythmia.  Systolic/diastolic heart failure:  Compensated.  Changes as above.  Persistent atrial fibrillation:  Plans for ablation.  Blisters:  Likely bullous dermatitis possibly related to amiodarone.  Return to clinic 1 month.           Kevin Ortiz MD, MPH, FACC, [facility]           [1]   Social History  Socioeconomic History    Marital status:    Tobacco Use    Smoking status: Never    Smokeless tobacco: Never   Substance and Sexual Activity    Alcohol use: Yes     Comment: socially    Drug use: Never    Sexual activity: Yes     Partners: Female   Social History Narrative    ** Merged History Encounter **          Social Drivers of Health     Financial Resource Strain: Low Risk  (3/11/2025)    Overall Financial Resource Strain (CARDIA)     Difficulty of Paying Living Expenses: Not hard at all   Food Insecurity: No Food Insecurity (3/11/2025)    Hunger Vital Sign     Worried About Running Out of Food in the Last Year: Never true     Ran Out of  Food in the Last Year: Never true   Transportation Needs: No Transportation Needs (3/11/2025)    PRAPARE - Transportation     Lack of Transportation (Medical): No     Lack of Transportation (Non-Medical): No   Physical Activity: Insufficiently Active (3/11/2025)    Exercise Vital Sign     Days of Exercise per Week: 3 days     Minutes of Exercise per Session: 30 min   Stress: No Stress Concern Present (3/11/2025)    [employer] [place] [employer] - Occupational Stress Questionnaire     Feeling of Stress : Only a little   Housing Stability: Low Risk  (3/11/2025)    Housing Stability Vital Sign     Unable to Pay for Housing in the Last Year: No     Number of Times Moved in the Last Year: 0     Homeless in the Last Year: No

## 2025-04-02 ENCOUNTER — TELEPHONE (OUTPATIENT)
Dept: ELECTROPHYSIOLOGY | Facility: CLINIC | Age: 45
End: 2025-04-02
Payer: COMMERCIAL

## 2025-04-02 NOTE — TELEPHONE ENCOUNTER
Spoke with patient and advised that his previously scheduled post DCCV follow up appointment scheduled on 4/21/2025 will be cancelled since his ablation has been scheduled on 5//16/2025. Instructed that he will be scheduled for a follow up appointment after his ablation procedure. Status of skin blisters discussed. Patient reports that existing blisters are healing and no new blisters are noted. Patient confirms understanding of instruction provided to stop Amiodarone and notify the office promptly if he should develop any new blisters.

## 2025-04-04 ENCOUNTER — HOSPITAL ENCOUNTER (OUTPATIENT)
Dept: CARDIOLOGY | Facility: CLINIC | Age: 45
Discharge: HOME | End: 2025-04-04
Payer: COMMERCIAL

## 2025-04-04 DIAGNOSIS — I48.19 PERSISTENT ATRIAL FIBRILLATION: ICD-10-CM

## 2025-04-04 LAB
OHS QRS DURATION: 92 MS
OHS QTC CALCULATION: 464 MS

## 2025-04-04 PROCEDURE — 93005 ELECTROCARDIOGRAM TRACING: CPT | Mod: S$GLB,,, | Performed by: INTERNAL MEDICINE

## 2025-04-04 PROCEDURE — 93010 ELECTROCARDIOGRAM REPORT: CPT | Mod: S$GLB,,, | Performed by: INTERNAL MEDICINE

## 2025-04-08 ENCOUNTER — DOCUMENTATION ONLY (OUTPATIENT)
Dept: CARDIOLOGY | Facility: CLINIC | Age: 45
End: 2025-04-08
Payer: COMMERCIAL

## 2025-04-08 NOTE — PROGRESS NOTES
[facility] ([facility]) Team notified of pt referral via Ambulatory Referral to Heart Failure Transitional Care ([id number]).    Patient screened today 4/8/2025 by provider and LPN for enrollment to program.      Call [facility] if anyone tries to change your Fluid pills or Heart medications.   Do daily weights to see if you are gaining fluid. Upon waking empty your bladder weigh yourself without much clothing and before you eat or drink anything. Record your weights and compare them for weight gain of 3 - 4lbs overnight or 5lbs in 3 days. Call [facility] if you have this.  Follow a low Salt/Sodium diet (2,000-3,000mg sodium) and limit your fluid to 1.5 - 2 liters a day.  A liter is a quart. Measure all that you drink.   Stop smoking and start exercising.   Keep all your appointments and call the [facility] if you have any SOB or signs of fluid gain.     Pt was deemed not a candidate for enrollment at this time related to patient refused. He states he is following up with is doctors and feels they have everything under control.    Pt will require additional follow up planning per primary team.     If pt status, diagnosis, or treatment plan changes , please place AMB referral to Heart Failure Transitional Care Clinic ([id number]) for [facility] enrollment re-evalution.

## 2025-04-14 ENCOUNTER — HOSPITAL ENCOUNTER (OUTPATIENT)
Dept: RADIOLOGY | Facility: HOSPITAL | Age: 45
Discharge: HOME | End: 2025-04-14
Attending: INTERNAL MEDICINE
Payer: COMMERCIAL

## 2025-04-14 ENCOUNTER — RESULTS FOLLOW-UP (OUTPATIENT)
Dept: CARDIOLOGY | Facility: CLINIC | Age: 45
End: 2025-04-14

## 2025-04-14 ENCOUNTER — HOSPITAL ENCOUNTER (OUTPATIENT)
Dept: CARDIOLOGY | Facility: HOSPITAL | Age: 45
Discharge: HOME | End: 2025-04-14
Attending: INTERNAL MEDICINE
Payer: COMMERCIAL

## 2025-04-14 DIAGNOSIS — I48.19 PERSISTENT ATRIAL FIBRILLATION: ICD-10-CM

## 2025-04-14 DIAGNOSIS — I42.9 CARDIOMYOPATHY, UNSPECIFIED TYPE: ICD-10-CM

## 2025-04-14 LAB
CV STRESS BASE HR: 92 BPM
DIASTOLIC BLOOD PRESSURE: 50 MMHG
NUC STRESS EJECTION FRACTION: 29 %
OHS CV CPX 85 PERCENT MAX PREDICTED HEART RATE MALE: 148
OHS CV CPX MAX PREDICTED HEART RATE: 174
OHS CV CPX PATIENT IS FEMALE: 0
OHS CV CPX PATIENT IS MALE: 1
OHS CV CPX PEAK DIASTOLIC BLOOD PRESSURE: 77 MMHG
OHS CV CPX PEAK HEAR RATE: 116 BPM
OHS CV CPX PEAK RATE PRESSURE PRODUCT: NORMAL
OHS CV CPX PEAK SYSTOLIC BLOOD PRESSURE: 121 MMHG
OHS CV CPX PERCENT MAX PREDICTED HEART RATE ACHIEVED: 67
OHS CV CPX RATE PRESSURE PRODUCT PRESENTING: NORMAL
OHS CV INITIAL DOSE: 10.4 MCG/KG/MIN
OHS CV PEAK DOSE: 30.5 MCG/KG/MIN
SYSTOLIC BLOOD PRESSURE: 182 MMHG

## 2025-04-14 PROCEDURE — 78452 HT MUSCLE IMAGE SPECT MULT: CPT

## 2025-04-14 PROCEDURE — 78452 HT MUSCLE IMAGE SPECT MULT: CPT | Mod: 26,,, | Performed by: INTERNAL MEDICINE

## 2025-04-14 PROCEDURE — 93017 CV STRESS TEST TRACING ONLY: CPT

## 2025-04-14 PROCEDURE — 93016 CV STRESS TEST SUPVJ ONLY: CPT | Mod: ,,, | Performed by: INTERNAL MEDICINE

## 2025-04-14 PROCEDURE — 63600175 PHARM REV CODE 636 W HCPCS: Performed by: INTERNAL MEDICINE

## 2025-04-14 PROCEDURE — 93018 CV STRESS TEST I&R ONLY: CPT | Mod: ,,, | Performed by: INTERNAL MEDICINE

## 2025-04-14 PROCEDURE — A9502 TC99M TETROFOSMIN: HCPCS | Performed by: INTERNAL MEDICINE

## 2025-04-14 RX ORDER — REGADENOSON 0.08 MG/ML
0.4 INJECTION, SOLUTION INTRAVENOUS ONCE
Status: COMPLETED | OUTPATIENT
Start: 2025-04-14 | End: 2025-04-14

## 2025-04-14 RX ADMIN — TETROFOSMIN 30.5 MILLICURIE: 1.38 INJECTION, POWDER, LYOPHILIZED, FOR SOLUTION INTRAVENOUS at 08:04

## 2025-04-14 RX ADMIN — REGADENOSON 0.4 MG: 0.08 INJECTION, SOLUTION INTRAVENOUS at 08:04

## 2025-04-14 RX ADMIN — TETROFOSMIN 10.4 MILLICURIE: 1.38 INJECTION, POWDER, LYOPHILIZED, FOR SOLUTION INTRAVENOUS at 07:04

## 2025-04-30 ENCOUNTER — OFFICE VISIT (OUTPATIENT)
Dept: CARDIOLOGY | Facility: CLINIC | Age: 45
End: 2025-04-30
Payer: COMMERCIAL

## 2025-04-30 VITALS
RESPIRATION RATE: 17 BRPM | OXYGEN SATURATION: 97 % | BODY MASS INDEX: 44.33 KG/M2 | WEIGHT: 282.44 LBS | SYSTOLIC BLOOD PRESSURE: 116 MMHG | HEART RATE: 94 BPM | DIASTOLIC BLOOD PRESSURE: 74 MMHG | HEIGHT: 67 IN

## 2025-04-30 DIAGNOSIS — R94.39 ABNORMAL NUCLEAR STRESS TEST: Primary | ICD-10-CM

## 2025-04-30 DIAGNOSIS — I42.9 CARDIOMYOPATHY, UNSPECIFIED TYPE: ICD-10-CM

## 2025-04-30 DIAGNOSIS — I48.19 PERSISTENT ATRIAL FIBRILLATION: ICD-10-CM

## 2025-04-30 DIAGNOSIS — E66.01 SEVERE OBESITY (BMI >= 40): Chronic | ICD-10-CM

## 2025-04-30 DIAGNOSIS — I50.42 CHRONIC COMBINED SYSTOLIC AND DIASTOLIC HEART FAILURE: ICD-10-CM

## 2025-04-30 PROCEDURE — 4010F ACE/ARB THERAPY RXD/TAKEN: CPT | Mod: CPTII,S$GLB,, | Performed by: INTERNAL MEDICINE

## 2025-04-30 PROCEDURE — 3078F DIAST BP <80 MM HG: CPT | Mod: CPTII,S$GLB,, | Performed by: INTERNAL MEDICINE

## 2025-04-30 PROCEDURE — 3044F HG A1C LEVEL LT 7.0%: CPT | Mod: CPTII,S$GLB,, | Performed by: INTERNAL MEDICINE

## 2025-04-30 PROCEDURE — 99214 OFFICE O/P EST MOD 30 MIN: CPT | Mod: S$GLB,,, | Performed by: INTERNAL MEDICINE

## 2025-04-30 PROCEDURE — 3074F SYST BP LT 130 MM HG: CPT | Mod: CPTII,S$GLB,, | Performed by: INTERNAL MEDICINE

## 2025-04-30 PROCEDURE — 3008F BODY MASS INDEX DOCD: CPT | Mod: CPTII,S$GLB,, | Performed by: INTERNAL MEDICINE

## 2025-04-30 PROCEDURE — 99999 PR PBB SHADOW E&M-EST. PATIENT-LVL IV: CPT | Mod: PBBFAC,,, | Performed by: INTERNAL MEDICINE

## 2025-04-30 PROCEDURE — 1159F MED LIST DOCD IN RCRD: CPT | Mod: CPTII,S$GLB,, | Performed by: INTERNAL MEDICINE

## 2025-04-30 RX ORDER — SODIUM CHLORIDE 0.9 % (FLUSH) 0.9 %
10 SYRINGE (ML) INJECTION EVERY 8 HOURS PRN
Status: SHIPPED | OUTPATIENT
Start: 2025-05-08

## 2025-04-30 RX ORDER — SODIUM CHLORIDE 9 MG/ML
INJECTION, SOLUTION INTRAVENOUS CONTINUOUS
OUTPATIENT
Start: 2025-05-08

## 2025-04-30 NOTE — H&P (VIEW-ONLY)
CARDIOLOGY CLINIC VISIT        HISTORY OF PRESENT ILLNESS:     03/25/2025: Danilo Krause is referred by Dr. Aleja Gastelum and Dr. Michael Appiah. He had presented with shortness of breath. Symptoms over the preceding month or so. Prior to that he was very active. Walked daily. 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 He went to urgent care.  Bilateral middle ear effusions and pharyngeal erythema. Treated for bronchitis with steroid injection, anti-histamines and albuterol inhaler. Pulse rate at that visit was 101 bpm. Symptoms did not improve. He went back to an urgent care on 3/8/2025. Had a CXR suggestive of bilateral pulmonary edema. Pulse rate was 91 bpm. Saw Dr. Gastelum who ordered an urgent CT chest.He was to see us in clinic but her ran into Dr. Appiah at a softball game. He was noted to be in an irregular rhythm. Admitted to [facility] [place]. He underwent JOSEF/DCCV. EF on JOSEF was 15-20% prior then roughly 30% post DCCV. Echocardiogram showed EF 15-20%. Mild MARILU. Moderate MR. Discharged home on GDMT. Placed on Amiodarone.    Plans for repeat DCCV on 3/28/2025.  He states that he is feeling much better.  On valsartan.  Recently had his Toprol changed to b.i.d..  Notes that his heart rate is better controlled.  Has not had to increase his Lasix.  He is weighing himself daily.  He is down roughly 17 lb.    04/01/2025:  Underwent cardioversion last week. Back in atrial fibrillation. No chest pain or shortness of breath. No pnd or orthopnea. LUE developed some blisters that have since drained. Do not look infected.    04/30/2025:  Nuclear stress showed a moderate intensity, reversible perfusion abnormality with some fixed areas in the anteroapical wall.    CARDIOVASCULAR HISTORY:     Cardiomyopathy  Systolic/diastolic heart failure  Persistent atrial fibrillation    PAST MEDICAL HISTORY:     Past Medical History:   Diagnosis Date    Anticoagulant long-term use     atrial fibrillation     CHF (congestive heart failure)     Severe obesity  (BMI >= 40) 08/22/2020       PAST SURGICAL HISTORY:     Past Surgical History:   Procedure Laterality Date    ECHOCARDIOGRAM,TRANSESOPHAGEAL N/A 3/14/2025    Procedure: Transesophageal echo (JOSEF) intra-procedure log documentation;  Surgeon: Rachael Pryor MD;  Location: [facility] EP LAB;  Service: Cardiology;  Laterality: N/A;    TREATMENT OF CARDIAC ARRHYTHMIA N/A 3/14/2025    Procedure: Cardioversion or Defibrillation;  Surgeon: Michael Appiah MD;  Location: [facility] EP LAB;  Service: Cardiology;  Laterality: N/A;  AF, JOSEF, DCCV, MAC, [place], 3 Prep    TREATMENT OF CARDIAC ARRHYTHMIA N/A 3/28/2025    Procedure: Cardioversion or Defibrillation;  Surgeon: Michael Appiah MD;  Location: [facility] EP LAB;  Service: Cardiology;  Laterality: N/A;  AF, JOSEF (cx if 100% OAC compliant), DCCV, MAC, WA, 3 Prep       ALLERGIES AND MEDICATION:   Review of patient's allergies indicates:  No Known Allergies     Medication List            Accurate as of April 30, 2025  2:34 PM. If you have any questions, ask your nurse or doctor.                CONTINUE taking these medications      albuterol 90 mcg/actuation inhaler  Commonly known as: PROVENTIL/VENTOLIN HFA     * amiodarone 200 MG Tab  Commonly known as: PACERONE  Take 2 tablets (400 mg total) by mouth 2 (two) times daily for 14 days, THEN 1 tablet (200 mg total) once daily.  Start taking on: March 15, 2025     * amiodarone 200 MG Tab  Commonly known as: PACERONE  Take 1 tablet (200 mg total) by mouth once daily.     apixaban 5 mg Tab  Commonly known as: ELIQUIS  Take 1 tablet (5 mg total) by mouth 2 (two) times daily.     azelastine 137 mcg (0.1 %) nasal spray  Commonly known as: ASTELIN  1 spray (137 mcg total) by Nasal route 2 (two) times daily.     cetirizine 10 MG tablet  Commonly known as: ZYRTEC  Take 1 tablet (10 mg total) by mouth once daily.     dapagliflozin propanediol 5 mg Tab tablet  Commonly known as: FARXIGA  Take 1 tablet (5 mg total) by mouth once daily.     ENTRESTO 24-26  mg per tablet  Generic drug: sacubitriL-valsartan  Take 1 tablet by mouth 2 (two) times daily.     fluticasone propionate 50 mcg/actuation nasal spray  Commonly known as: FLONASE  1 spray (50 mcg total) by Each Nostril route once daily.     furosemide 20 MG tablet  Commonly known as: LASIX  Take 1 tablet (20 mg total) by mouth once daily. Take additional 20-mg tablet for weight gain over 2 lbs/day or 5 lbs/week or Edema.     metoprolol succinate 25 MG 24 hr tablet  Commonly known as: TOPROL-XL  Take 1 tablet (25 mg total) by mouth 2 (two) times daily.     promethazine-dextromethorphan 6.25-15 mg/5 mL Syrp  Commonly known as: PROMETHAZINE-DM           * This list has 2 medication(s) that are the same as other medications prescribed for you. Read the directions carefully, and ask your doctor or other care provider to review them with you.                  SOCIAL HISTORY:   Social History[1]    FAMILY HISTORY:     Family History   Problem Relation Name Age of Onset    Hypertension Father      Atrial fibrillation Father         REVIEW OF SYSTEMS:   Review of Systems   Constitutional:  Negative for chills, diaphoresis, fever, malaise/fatigue and weight loss.   HENT:  Negative for congestion, hearing loss, sinus pain, sore throat and tinnitus.    Eyes:  Negative for blurred vision, double vision, photophobia and pain.   Respiratory:  Negative for cough, hemoptysis, sputum production, shortness of breath, wheezing and stridor.    Cardiovascular:  Negative for chest pain, palpitations, orthopnea, claudication, leg swelling and PND.   Gastrointestinal:  Negative for abdominal pain, blood in stool, heartburn, melena, nausea and vomiting.   Musculoskeletal:  Negative for back pain, falls, joint pain, myalgias and neck pain.   Neurological:  Negative for dizziness, tingling, tremors, sensory change, speech change, focal weakness, seizures, loss of consciousness, weakness and headaches.   Endo/Heme/Allergies:  Does not  "bruise/bleed easily.   Psychiatric/Behavioral:  Negative for depression, memory loss and substance abuse. The patient is not nervous/anxious.        PHYSICAL EXAM:     Vitals:    04/30/25 1303   BP: 116/74   Pulse: 94   Resp: 17        Body mass index is 44.23 kg/m².  Weight: 128.1 kg (282 lb 6.6 oz)   Height: 5' 7" (170.2 cm)     Physical Exam  Vitals reviewed.   Constitutional:       General: He is not in acute distress.     Appearance: Normal appearance. He is well-developed. He is obese. He is not diaphoretic.   HENT:      Head: Normocephalic.   Eyes:      Extraocular Movements: Extraocular movements intact.   Neck:      Vascular: No carotid bruit or JVD.   Cardiovascular:      Rate and Rhythm: Normal rate. Rhythm irregularly irregular.      Pulses: Normal pulses.      Heart sounds: Normal heart sounds.   Pulmonary:      Effort: Pulmonary effort is normal.      Breath sounds: Normal breath sounds. No wheezing, rhonchi or rales.   Chest:      Chest wall: No tenderness.   Abdominal:      General: Bowel sounds are normal. There is no distension.      Palpations: Abdomen is soft.      Tenderness: There is no abdominal tenderness.   Musculoskeletal:      Right lower leg: No edema.      Left lower leg: No edema.   Skin:     General: Skin is warm and dry.      Coloration: Skin is not jaundiced or pale.      Findings: No erythema.   Neurological:      General: No focal deficit present.      Mental Status: He is alert and oriented to person, place, and time.      Motor: No weakness.   Psychiatric:         Speech: Speech normal.         Behavior: Behavior normal.         Thought Content: Thought content normal.         DATA:   EKG: (personally reviewed tracing)  03/21/2025 - atrial fibrillation  Results for orders placed or performed during the hospital encounter of 03/28/25   EKG 12-LEAD    Collection Time: 03/28/25 12:52 PM   Result Value Ref Range    QRS Duration 80 ms    [facility]HS QTC Calculation 484 ms    Narrative    Test " Reason : I48.91,    Vent. Rate :  65 BPM     Atrial Rate :  65 BPM     P-R Int : 196 ms          QRS Dur :  80 ms      QT Int : 466 ms       P-R-T Axes :  40  71  41 degrees    QTcB Int : 484 ms    Normal sinus rhythm  Prolonged QT  Abnormal ECG  When compared with ECG of 28-Mar-2025 10:12,  Sinus rhythm has replaced Atrial fibrillation  Anterior forces are higher  Confirmed by Guy Bardales ([id number]) on 3/28/2025 12:56:51 PM    Referred By: CLAUDIA MON           Confirmed By: Guy Bardales        Laboratory:  CBC:  Recent Labs   Lab 02/21/25 0826 03/14/25 1842   WBC 5.38 6.02   Hemoglobin 14.4 15.5   Hematocrit 43.0 46.1   Platelets 241 234       CHEMISTRIES:  Recent Labs   Lab 02/21/25  0826 03/14/25  1842 03/15/25  0358 03/28/25  0917   Glucose 94 93 79  --    Sodium 140 142 141  --    Potassium 4.3 3.9 3.9  --    BUN 12 12 14  --    Creatinine 0.9 1.0 0.8  --    Calcium 8.7 8.6 L 8.3 L 9.1   Magnesium  --  1.9 2.0  --        CARDIAC BIOMARKERS:        COAGS:  Recent Labs   Lab 03/14/25  1232   INR 1.2       LIPIDS/LFTS:  Recent Labs   Lab 02/21/25  0826 03/14/25  1842 03/15/25  0358   Cholesterol 152  --  122   Triglycerides 59  --  54   HDL 37 L  --  28 L   LDL Cholesterol 103.2  --  83.2   Non-HDL Cholesterol 115  --  94   AST 29 29 26   ALT 26 51 H 40       Hemoglobin A1C   Date Value Ref Range Status   03/15/2025 5.0 4.0 - 5.6 % Final     Comment:     ADA Screening Guidelines:  5.7-6.4%  Consistent with prediabetes  >or=6.5%  Consistent with diabetes    High levels of fetal hemoglobin interfere with the HbA1C  assay. Heterozygous hemoglobin variants (HbS, HgC, etc)do  not significantly interfere with this assay.   However, presence of multiple variants may affect accuracy.     02/21/2025 5.1 4.0 - 5.6 % Final     Comment:     ADA Screening Guidelines:  5.7-6.4%  Consistent with prediabetes  >or=6.5%  Consistent with diabetes    High levels of fetal hemoglobin interfere with the HbA1C  assay. Heterozygous hemoglobin  variants (HbS, HgC, etc)do  not significantly interfere with this assay.   However, presence of multiple variants may affect accuracy.          The ASCVD Risk score (Turner DK, et al., 2019) failed to calculate for the following reasons:    The valid total cholesterol range is 130 to 320 mg/dL      Cardiovascular Testing:    Nuclear stress 04/14/2025:      Abnormal myocardial perfusion scan.    There is a moderate intensity, mostly reversible perfusion abnormality with some fixed areas in the anteroapical wall.    The gated perfusion images showed an ejection fraction of 29% post stress.    The ECG portion of the study is negative for ischemia.    The patient reported no chest pain during the stress test.    During stress, atrial fibrillation is noted.    Transesophageal echo (JOSEF)  Result Date: 3/18/2025    JOSEF performed prior to [facility]; there is no evidence of intracardiac   thrombus.    Left Ventricle: The left ventricle is normal in size. Global   hypokinesis present. There is severely reduced systolic function with a   visually estimated ejection fraction of 15 - 20% during supraventricular   tachycardia to 150bpm. After cardioversion to normal sinus, the EF appears   improved to 25-30%.    Right Ventricle: The right ventricle is normal in size. Systolic   function is mildly reduced.    Left Atrium: dilated The left atrial appendage has a windsock   morphology. Appendage velocity is normal at greater than 40 cm/sec.   Spontaneous filling of echo contrast. There is no thrombus in the left   atrial appendage.    Mitral Valve: There is mild to moderate regurgitation with a centrally   directed jet.    Tricuspid Valve: There is mild regurgitation.    Aorta: Aortic root is normal in size measuring 3.4 cm. Aortic root at   ST junction is normal.    Pericardium: There is no pericardial effusion.        Echo  Result Date: 3/15/2025    Left Ventricle: The left ventricle is mildly dilated. Ventricular mass   is normal.  Normal wall thickness. Severe global hypokinesis and regional   wall motion abnormalities present. There is severely reduced systolic   function with a visually estimated ejection fraction of 15 - 20%.   Quantitated ejection fraction is 17%. There is diastolic dysfunction.    Right Ventricle: The right ventricle has mild enlargement. Systolic   function is mildly reduced.    Left Atrium: Mildly dilated    Right Atrium: Right atrium is mildly dilated.    Mitral Valve: There is moderate regurgitation.    Tricuspid Valve: There is mild to moderate regurgitation.    Pulmonary Artery: The estimated pulmonary artery systolic pressure is   27 mmHg.    IVC/SVC: Elevated venous pressure at 15 mmHg.       Cardioversion 03/14/2025:      Cardioversion was unsuccessful at 200J energy without restoration of normal sinus rhythm.    Cardioversion was successfully performed with 400J energy with restoration of normal sinus rhythm.    No cardiac monitor results found for the past 12 months         ASSESSMENT:     Abnormal nuclear stress test  Cardiomyopathy  Acute Systolic/diastolic heart failure  Persistent Atrial fibrillation  Chronic anticoagulation  Obesity    PLAN:     Abnormal nuclear stress test: Recommend left heart catheterization +/-PCI. Right radial access. Schedule for 5/8/2025. Hold apixaban two days prior.Risks, benefits and alternatives of the catheterization procedure were discussed with the patient.The risks of coronary angiography include but are not limited to: bleeding, infection, death, heart attack, arrhythmia, kidney injury or failure, potential need for dialysis, allergic reactions, stroke, need for emergency surgery, hematoma, pseudoaneurysm etc.  Should stenting be indicated, the patient has agreed to dual anti-platelet therapy for 1-consecutive year with a drug-eluting stent and a minimum of 1-month with the use of a bare metal stent. Additionally, pt is aware that non-compliance is likely to result in  stent clotting with heart attack, heart failure, and/or death  The risks of moderate sedation include hypotension, respiratory depression, arrhythmias, bronchospasm, and death. Informed consent was obtained and the  patient is agreeable to proceed with the procedure. Consent was placed on the chart.  Cardiomyopathy:  Continue current management.  Systolic/diastolic heart failure:  Compensated.    Persistent atrial fibrillation:  Plans for ablation.  Return to clinic post procedure.           Kevin Ortiz MD, MPH, [facility], [facility] [place] [facility]             [1]   Social History  Socioeconomic History    Marital status:    Tobacco Use    Smoking status: Never    Smokeless tobacco: Never   Substance and Sexual Activity    Alcohol use: Yes     Comment: socially    Drug use: Never    Sexual activity: Yes     Partners: Female   Social History Narrative    ** Merged History Encounter **          Social Drivers of Health     Financial Resource Strain: Low Risk  (3/11/2025)    Overall Financial Resource Strain (CARDIA)     Difficulty of Paying Living Expenses: Not hard at all   Food Insecurity: No Food Insecurity (3/11/2025)    Hunger Vital Sign     Worried About Running Out of Food in the Last Year: Never true     Ran Out of Food in the Last Year: Never true   Transportation Needs: No Transportation Needs (3/11/2025)    PRAPARE - Transportation     Lack of Transportation (Medical): No     Lack of Transportation (Non-Medical): No   Physical Activity: Insufficiently Active (3/11/2025)    Exercise Vital Sign     Days of Exercise per Week: 3 days     Minutes of Exercise per Session: 30 min   Stress: No Stress Concern Present (3/11/2025)    [place] [employer] [place] [employer] - Occupational Stress Questionnaire     Feeling of Stress : Only a little   Housing Stability: Low Risk  (3/11/2025)    Housing Stability Vital Sign     Unable to Pay for Housing in the Last Year: No     Number of Times Moved in the Last Year: 0     Homeless in  the Last Year: No

## 2025-04-30 NOTE — PROGRESS NOTES
CARDIOLOGY CLINIC VISIT        HISTORY OF PRESENT ILLNESS:     03/25/2025: Danilo Krause is referred by Dr. Aleja Gastelum and Dr. Michael Appiah. He had presented with shortness of breath. Symptoms over the preceding month or so. Prior to that he was very active. Walked daily. 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 He went to urgent care.  Bilateral middle ear effusions and pharyngeal erythema. Treated for bronchitis with steroid injection, anti-histamines and albuterol inhaler. Pulse rate at that visit was 101 bpm. Symptoms did not improve. He went back to an urgent care on 3/8/2025. Had a CXR suggestive of bilateral pulmonary edema. Pulse rate was 91 bpm. Saw Dr. Gastelum who ordered an urgent CT chest.He was to see us in clinic but her ran into Dr. Appiah at a softball game. He was noted to be in an irregular rhythm. Admitted to [facility] [place]. He underwent JOSEF/DCCV. EF on JOSEF was 15-20% prior then roughly 30% post DCCV. Echocardiogram showed EF 15-20%. Mild MARILU. Moderate MR. Discharged home on GDMT. Placed on Amiodarone.    Plans for repeat DCCV on 3/28/2025.  He states that he is feeling much better.  On valsartan.  Recently had his Toprol changed to b.i.d..  Notes that his heart rate is better controlled.  Has not had to increase his Lasix.  He is weighing himself daily.  He is down roughly 17 lb.    04/01/2025:  Underwent cardioversion last week. Back in atrial fibrillation. No chest pain or shortness of breath. No pnd or orthopnea. LUE developed some blisters that have since drained. Do not look infected.    04/30/2025:  Nuclear stress showed a moderate intensity, reversible perfusion abnormality with some fixed areas in the anteroapical wall.    CARDIOVASCULAR HISTORY:     Cardiomyopathy  Systolic/diastolic heart failure  Persistent atrial fibrillation    PAST MEDICAL HISTORY:     Past Medical History:   Diagnosis Date    Anticoagulant long-term use     atrial fibrillation     CHF (congestive heart failure)     Severe obesity  (BMI >= 40) 08/22/2020       PAST SURGICAL HISTORY:     Past Surgical History:   Procedure Laterality Date    ECHOCARDIOGRAM,TRANSESOPHAGEAL N/A 3/14/2025    Procedure: Transesophageal echo (JOSEF) intra-procedure log documentation;  Surgeon: Rachael Pryor MD;  Location: [facility] EP LAB;  Service: Cardiology;  Laterality: N/A;    TREATMENT OF CARDIAC ARRHYTHMIA N/A 3/14/2025    Procedure: Cardioversion or Defibrillation;  Surgeon: Michael Appiah MD;  Location: [facility] EP LAB;  Service: Cardiology;  Laterality: N/A;  AF, JOSEF, DCCV, MAC, SD, 3 Prep    TREATMENT OF CARDIAC ARRHYTHMIA N/A 3/28/2025    Procedure: Cardioversion or Defibrillation;  Surgeon: Michael Appiah MD;  Location: [facility] EP LAB;  Service: Cardiology;  Laterality: N/A;  AF, JOSEF (cx if 100% OAC compliant), DCCV, MAC, SD, 3 Prep       ALLERGIES AND MEDICATION:   Review of patient's allergies indicates:  No Known Allergies     Medication List            Accurate as of April 30, 2025  2:34 PM. If you have any questions, ask your nurse or doctor.                CONTINUE taking these medications      albuterol 90 mcg/actuation inhaler  Commonly known as: PROVENTIL/VENTOLIN HFA     * amiodarone 200 MG Tab  Commonly known as: PACERONE  Take 2 tablets (400 mg total) by mouth 2 (two) times daily for 14 days, THEN 1 tablet (200 mg total) once daily.  Start taking on: March 15, 2025     * amiodarone 200 MG Tab  Commonly known as: PACERONE  Take 1 tablet (200 mg total) by mouth once daily.     apixaban 5 mg Tab  Commonly known as: ELIQUIS  Take 1 tablet (5 mg total) by mouth 2 (two) times daily.     azelastine 137 mcg (0.1 %) nasal spray  Commonly known as: ASTELIN  1 spray (137 mcg total) by Nasal route 2 (two) times daily.     cetirizine 10 MG tablet  Commonly known as: ZYRTEC  Take 1 tablet (10 mg total) by mouth once daily.     dapagliflozin propanediol 5 mg Tab tablet  Commonly known as: FARXIGA  Take 1 tablet (5 mg total) by mouth once daily.     ENTRESTO 24-26  mg per tablet  Generic drug: sacubitriL-valsartan  Take 1 tablet by mouth 2 (two) times daily.     fluticasone propionate 50 mcg/actuation nasal spray  Commonly known as: FLONASE  1 spray (50 mcg total) by Each Nostril route once daily.     furosemide 20 MG tablet  Commonly known as: LASIX  Take 1 tablet (20 mg total) by mouth once daily. Take additional 20-mg tablet for weight gain over 2 lbs/day or 5 lbs/week or Edema.     metoprolol succinate 25 MG 24 hr tablet  Commonly known as: TOPROL-XL  Take 1 tablet (25 mg total) by mouth 2 (two) times daily.     promethazine-dextromethorphan 6.25-15 mg/5 mL Syrp  Commonly known as: PROMETHAZINE-DM           * This list has 2 medication(s) that are the same as other medications prescribed for you. Read the directions carefully, and ask your doctor or other care provider to review them with you.                  SOCIAL HISTORY:   Social History[1]    FAMILY HISTORY:     Family History   Problem Relation Name Age of Onset    Hypertension Father      Atrial fibrillation Father         REVIEW OF SYSTEMS:   Review of Systems   Constitutional:  Negative for chills, diaphoresis, fever, malaise/fatigue and weight loss.   HENT:  Negative for congestion, hearing loss, sinus pain, sore throat and tinnitus.    Eyes:  Negative for blurred vision, double vision, photophobia and pain.   Respiratory:  Negative for cough, hemoptysis, sputum production, shortness of breath, wheezing and stridor.    Cardiovascular:  Negative for chest pain, palpitations, orthopnea, claudication, leg swelling and PND.   Gastrointestinal:  Negative for abdominal pain, blood in stool, heartburn, melena, nausea and vomiting.   Musculoskeletal:  Negative for back pain, falls, joint pain, myalgias and neck pain.   Neurological:  Negative for dizziness, tingling, tremors, sensory change, speech change, focal weakness, seizures, loss of consciousness, weakness and headaches.   Endo/Heme/Allergies:  Does not  "bruise/bleed easily.   Psychiatric/Behavioral:  Negative for depression, memory loss and substance abuse. The patient is not nervous/anxious.        PHYSICAL EXAM:     Vitals:    04/30/25 1303   BP: 116/74   Pulse: 94   Resp: 17        Body mass index is 44.23 kg/m².  Weight: 128.1 kg (282 lb 6.6 oz)   Height: 5' 7" (170.2 cm)     Physical Exam  Vitals reviewed.   Constitutional:       General: He is not in acute distress.     Appearance: Normal appearance. He is well-developed. He is obese. He is not diaphoretic.   HENT:      Head: Normocephalic.   Eyes:      Extraocular Movements: Extraocular movements intact.   Neck:      Vascular: No carotid bruit or JVD.   Cardiovascular:      Rate and Rhythm: Normal rate. Rhythm irregularly irregular.      Pulses: Normal pulses.      Heart sounds: Normal heart sounds.   Pulmonary:      Effort: Pulmonary effort is normal.      Breath sounds: Normal breath sounds. No wheezing, rhonchi or rales.   Chest:      Chest wall: No tenderness.   Abdominal:      General: Bowel sounds are normal. There is no distension.      Palpations: Abdomen is soft.      Tenderness: There is no abdominal tenderness.   Musculoskeletal:      Right lower leg: No edema.      Left lower leg: No edema.   Skin:     General: Skin is warm and dry.      Coloration: Skin is not jaundiced or pale.      Findings: No erythema.   Neurological:      General: No focal deficit present.      Mental Status: He is alert and oriented to person, place, and time.      Motor: No weakness.   Psychiatric:         Speech: Speech normal.         Behavior: Behavior normal.         Thought Content: Thought content normal.         DATA:   EKG: (personally reviewed tracing)  03/21/2025 - atrial fibrillation  Results for orders placed or performed during the hospital encounter of 03/28/25   EKG 12-LEAD    Collection Time: 03/28/25 12:52 PM   Result Value Ref Range    QRS Duration 80 ms    OHS QTC Calculation 484 ms    Narrative    Test " Reason : I48.91,    Vent. Rate :  65 BPM     Atrial Rate :  65 BPM     P-R Int : 196 ms          QRS Dur :  80 ms      QT Int : 466 ms       P-R-T Axes :  40  71  41 degrees    QTcB Int : 484 ms    Normal sinus rhythm  Prolonged QT  Abnormal ECG  When compared with ECG of 28-Mar-2025 10:12,  Sinus rhythm has replaced Atrial fibrillation  Anterior forces are higher  Confirmed by Guy Bardales ([id number]) on 3/28/2025 12:56:51 PM    Referred By: CLAUDIA MON           Confirmed By: Guy Bardales        Laboratory:  CBC:  Recent Labs   Lab 02/21/25 0826 03/14/25 1842   WBC 5.38 6.02   Hemoglobin 14.4 15.5   Hematocrit 43.0 46.1   Platelets 241 234       CHEMISTRIES:  Recent Labs   Lab 02/21/25  0826 03/14/25  1842 03/15/25  0358 03/28/25  0917   Glucose 94 93 79  --    Sodium 140 142 141  --    Potassium 4.3 3.9 3.9  --    BUN 12 12 14  --    Creatinine 0.9 1.0 0.8  --    Calcium 8.7 8.6 L 8.3 L 9.1   Magnesium  --  1.9 2.0  --        CARDIAC BIOMARKERS:        COAGS:  Recent Labs   Lab 03/14/25  1232   INR 1.2       LIPIDS/LFTS:  Recent Labs   Lab 02/21/25  0826 03/14/25  1842 03/15/25  0358   Cholesterol 152  --  122   Triglycerides 59  --  54   HDL 37 L  --  28 L   LDL Cholesterol 103.2  --  83.2   Non-HDL Cholesterol 115  --  94   AST 29 29 26   ALT 26 51 H 40       Hemoglobin A1C   Date Value Ref Range Status   03/15/2025 5.0 4.0 - 5.6 % Final     Comment:     ADA Screening Guidelines:  5.7-6.4%  Consistent with prediabetes  >or=6.5%  Consistent with diabetes    High levels of fetal hemoglobin interfere with the HbA1C  assay. Heterozygous hemoglobin variants (HbS, HgC, etc)do  not significantly interfere with this assay.   However, presence of multiple variants may affect accuracy.     02/21/2025 5.1 4.0 - 5.6 % Final     Comment:     ADA Screening Guidelines:  5.7-6.4%  Consistent with prediabetes  >or=6.5%  Consistent with diabetes    High levels of fetal hemoglobin interfere with the HbA1C  assay. Heterozygous hemoglobin  variants (HbS, HgC, etc)do  not significantly interfere with this assay.   However, presence of multiple variants may affect accuracy.          The ASCVD Risk score (Turner DK, et al., 2019) failed to calculate for the following reasons:    The valid total cholesterol range is 130 to 320 mg/dL      Cardiovascular Testing:    Nuclear stress 04/14/2025:      Abnormal myocardial perfusion scan.    There is a moderate intensity, mostly reversible perfusion abnormality with some fixed areas in the anteroapical wall.    The gated perfusion images showed an ejection fraction of 29% post stress.    The ECG portion of the study is negative for ischemia.    The patient reported no chest pain during the stress test.    During stress, atrial fibrillation is noted.    Transesophageal echo (JOSEF)  Result Date: 3/18/2025    JOSEF performed prior to [facility]; there is no evidence of intracardiac   thrombus.    Left Ventricle: The left ventricle is normal in size. Global   hypokinesis present. There is severely reduced systolic function with a   visually estimated ejection fraction of 15 - 20% during supraventricular   tachycardia to 150bpm. After cardioversion to normal sinus, the EF appears   improved to 25-30%.    Right Ventricle: The right ventricle is normal in size. Systolic   function is mildly reduced.    Left Atrium: dilated The left atrial appendage has a windsock   morphology. Appendage velocity is normal at greater than 40 cm/sec.   Spontaneous filling of echo contrast. There is no thrombus in the left   atrial appendage.    Mitral Valve: There is mild to moderate regurgitation with a centrally   directed jet.    Tricuspid Valve: There is mild regurgitation.    Aorta: Aortic root is normal in size measuring 3.4 cm. Aortic root at   ST junction is normal.    Pericardium: There is no pericardial effusion.        Echo  Result Date: 3/15/2025    Left Ventricle: The left ventricle is mildly dilated. Ventricular mass   is normal.  Normal wall thickness. Severe global hypokinesis and regional   wall motion abnormalities present. There is severely reduced systolic   function with a visually estimated ejection fraction of 15 - 20%.   Quantitated ejection fraction is 17%. There is diastolic dysfunction.    Right Ventricle: The right ventricle has mild enlargement. Systolic   function is mildly reduced.    Left Atrium: Mildly dilated    Right Atrium: Right atrium is mildly dilated.    Mitral Valve: There is moderate regurgitation.    Tricuspid Valve: There is mild to moderate regurgitation.    Pulmonary Artery: The estimated pulmonary artery systolic pressure is   27 mmHg.    IVC/SVC: Elevated venous pressure at 15 mmHg.       Cardioversion 03/14/2025:      Cardioversion was unsuccessful at 200J energy without restoration of normal sinus rhythm.    Cardioversion was successfully performed with 400J energy with restoration of normal sinus rhythm.    No cardiac monitor results found for the past 12 months         ASSESSMENT:     Abnormal nuclear stress test  Cardiomyopathy  Acute Systolic/diastolic heart failure  Persistent Atrial fibrillation  Chronic anticoagulation  Obesity    PLAN:     Abnormal nuclear stress test: Recommend left heart catheterization +/-PCI. Right radial access. Schedule for 5/8/2025. Hold apixaban two days prior.Risks, benefits and alternatives of the catheterization procedure were discussed with the patient.The risks of coronary angiography include but are not limited to: bleeding, infection, death, heart attack, arrhythmia, kidney injury or failure, potential need for dialysis, allergic reactions, stroke, need for emergency surgery, hematoma, pseudoaneurysm etc.  Should stenting be indicated, the patient has agreed to dual anti-platelet therapy for 1-consecutive year with a drug-eluting stent and a minimum of 1-month with the use of a bare metal stent. Additionally, pt is aware that non-compliance is likely to result in  stent clotting with heart attack, heart failure, and/or death  The risks of moderate sedation include hypotension, respiratory depression, arrhythmias, bronchospasm, and death. Informed consent was obtained and the  patient is agreeable to proceed with the procedure. Consent was placed on the chart.  Cardiomyopathy:  Continue current management.  Systolic/diastolic heart failure:  Compensated.    Persistent atrial fibrillation:  Plans for ablation.  Return to clinic post procedure.           Kevin Ortiz MD, MPH, [facility], [facility]             [1]   Social History  Socioeconomic History    Marital status:    Tobacco Use    Smoking status: Never    Smokeless tobacco: Never   Substance and Sexual Activity    Alcohol use: Yes     Comment: socially    Drug use: Never    Sexual activity: Yes     Partners: Female   Social History Narrative    ** Merged History Encounter **          Social Drivers of Health     Financial Resource Strain: Low Risk  (3/11/2025)    Overall Financial Resource Strain (CARDIA)     Difficulty of Paying Living Expenses: Not hard at all   Food Insecurity: No Food Insecurity (3/11/2025)    Hunger Vital Sign     Worried About Running Out of Food in the Last Year: Never true     Ran Out of Food in the Last Year: Never true   Transportation Needs: No Transportation Needs (3/11/2025)    PRAPARE - Transportation     Lack of Transportation (Medical): No     Lack of Transportation (Non-Medical): No   Physical Activity: Insufficiently Active (3/11/2025)    Exercise Vital Sign     Days of Exercise per Week: 3 days     Minutes of Exercise per Session: 30 min   Stress: No Stress Concern Present (3/11/2025)    [employer] - Occupational Stress Questionnaire     Feeling of Stress : Only a little   Housing Stability: Low Risk  (3/11/2025)    Housing Stability Vital Sign     Unable to Pay for Housing in the Last Year: No     Number of Times Moved in the Last Year: 0     Homeless in  the Last Year: No

## 2025-05-05 ENCOUNTER — HOSPITAL ENCOUNTER (OUTPATIENT)
Dept: PREADMISSION TESTING | Facility: HOSPITAL | Age: 45
Discharge: HOME | End: 2025-05-05
Attending: INTERNAL MEDICINE
Payer: COMMERCIAL

## 2025-05-05 ENCOUNTER — RESULTS FOLLOW-UP (OUTPATIENT)
Dept: CARDIOLOGY | Facility: CLINIC | Age: 45
End: 2025-05-05

## 2025-05-05 VITALS
WEIGHT: 282.5 LBS | SYSTOLIC BLOOD PRESSURE: 128 MMHG | BODY MASS INDEX: 44.34 KG/M2 | HEIGHT: 67 IN | RESPIRATION RATE: 18 BRPM | DIASTOLIC BLOOD PRESSURE: 79 MMHG | HEART RATE: 108 BPM | OXYGEN SATURATION: 97 % | TEMPERATURE: 98 F

## 2025-05-05 DIAGNOSIS — I48.91 ATRIAL FIBRILLATION: ICD-10-CM

## 2025-05-05 LAB
ANION GAP (OHS): 11 MMOL/L (ref 8–16)
BUN SERPL-MCNC: 15 MG/DL (ref 6–20)
CALCIUM SERPL-MCNC: 8.6 MG/DL (ref 8.7–10.5)
CHLORIDE SERPL-SCNC: 107 MMOL/L (ref 95–110)
CO2 SERPL-SCNC: 21 MMOL/L (ref 23–29)
CREAT SERPL-MCNC: 0.9 MG/DL (ref 0.5–1.4)
ERYTHROCYTE [DISTWIDTH] IN BLOOD BY AUTOMATED COUNT: 13 % (ref 11.5–14.5)
GFR SERPLBLD CREATININE-BSD FMLA CKD-EPI: >60 ML/MIN/1.73/M2
GLUCOSE SERPL-MCNC: 83 MG/DL (ref 70–110)
HCT VFR BLD AUTO: 45.1 % (ref 40–54)
HGB BLD-MCNC: 15.4 GM/DL (ref 14–18)
MCH RBC QN AUTO: 31 PG (ref 27–31)
MCHC RBC AUTO-ENTMCNC: 34.1 G/DL (ref 32–36)
MCV RBC AUTO: 91 FL (ref 82–98)
PLATELET # BLD AUTO: 211 K/UL (ref 150–450)
PMV BLD AUTO: 10.2 FL (ref 9.2–12.9)
POTASSIUM SERPL-SCNC: 4 MMOL/L (ref 3.5–5.1)
RBC # BLD AUTO: 4.96 M/UL (ref 4.6–6.2)
SODIUM SERPL-SCNC: 139 MMOL/L (ref 136–145)
WBC # BLD AUTO: 4.62 K/UL (ref 3.9–12.7)

## 2025-05-05 PROCEDURE — 82310 ASSAY OF CALCIUM: CPT | Performed by: INTERNAL MEDICINE

## 2025-05-05 PROCEDURE — 85027 COMPLETE CBC AUTOMATED: CPT | Performed by: INTERNAL MEDICINE

## 2025-05-05 NOTE — DISCHARGE INSTRUCTIONS
YOUR PROCEDURE WILL BE AT [facility] at [address] [place] [address]                             Enter through the Main Entrance facing [address].      Your procedure  is scheduled for ____5/8/2025_____________.    Call [phone] between 2pm and 5pm on __5/7/2025_____to find out your arrival time for the day of surgery.    You may have up to three visitors.  No children under 18 years old.      You will be going to the Same Day Surgery Unit on the 2nd floor of the hospital.    Report to the Same Day Surgery Registration Desk in the hallway.(Just beside the Same Day Surgery Unit)                    DO NOT PARK IN THE GARAGE.  THERE IS NO OPEN ENTRANCE TO THE HOSPITAL BUILDING AND NO ELEVATOR.      Important instructions:  Do not eat anything after midnight.  You may have plain water, non carbonated.  You may also have Gatorade or Powerade after midnight.    Stop all fluids 2 hours before your surgery.    It is okay to brush your teeth.  Do not have gum, candy or mints.    Do not take any diabetic medication on the morning of surgery unless instructed to do so by your doctor or pre op nurse.    Metformin, Invokamet and Synjardy must be stopped two days before your procedure.  Do not take on the day of procedure.  Resume when instructed.    Please shower the night before and the morning of your surgery.        Use Chlorhexidine soap as instructed by your pre op nurse.   Please place clean linens on your bed the night before surgery. Please wear fresh clean clothing after each shower.    No shaving of procedural area at least 4-5 days before surgery due to increased risk of skin irritation and/or possible infection.    Contact lenses and removable denture work may not be worn during your procedure.    You may wear deodorant only.     Do not wear powder, body lotion, perfume/cologne or make-up.    Do not wear any jewelry or have any metal on your body.    You will be  asked to remove any dentures or partials for the procedure.    If you are going home on the same day of surgery, you must arrange for a family member or a friend to drive you home.  Public transportation is prohibited.  You will not be able to drive home if you were given anesthesia or sedation.    Please leave money and valuables home.      You may bring your cell phone.    Call the doctor if fever or illness should occur before your surgery.    Call [phone] to contact us here if needed.

## 2025-05-07 ENCOUNTER — TELEPHONE (OUTPATIENT)
Dept: SURGERY | Facility: HOSPITAL | Age: 45
End: 2025-05-07
Payer: COMMERCIAL

## 2025-05-07 LAB
OHS QRS DURATION: 88 MS
OHS QTC CALCULATION: 408 MS

## 2025-05-08 ENCOUNTER — HOSPITAL ENCOUNTER (OUTPATIENT)
Facility: HOSPITAL | Age: 45
Discharge: HOME | End: 2025-05-08
Attending: INTERNAL MEDICINE | Admitting: INTERNAL MEDICINE
Payer: COMMERCIAL

## 2025-05-08 VITALS
SYSTOLIC BLOOD PRESSURE: 114 MMHG | BODY MASS INDEX: 44.29 KG/M2 | HEART RATE: 78 BPM | TEMPERATURE: 98 F | WEIGHT: 282.81 LBS | RESPIRATION RATE: 20 BRPM | DIASTOLIC BLOOD PRESSURE: 65 MMHG | OXYGEN SATURATION: 99 %

## 2025-05-08 DIAGNOSIS — R94.39 ABNORMAL NUCLEAR STRESS TEST: ICD-10-CM

## 2025-05-08 DIAGNOSIS — I42.8 NICM (NONISCHEMIC CARDIOMYOPATHY): Primary | ICD-10-CM

## 2025-05-08 PROCEDURE — C1894 INTRO/SHEATH, NON-LASER: HCPCS | Performed by: INTERNAL MEDICINE

## 2025-05-08 PROCEDURE — 99238 HOSP IP/OBS DSCHRG MGMT 30/<: CPT | Mod: ,,, | Performed by: INTERNAL MEDICINE

## 2025-05-08 PROCEDURE — 25000003 PHARM REV CODE 250: Performed by: INTERNAL MEDICINE

## 2025-05-08 PROCEDURE — 93458 L HRT ARTERY/VENTRICLE ANGIO: CPT | Performed by: INTERNAL MEDICINE

## 2025-05-08 PROCEDURE — 93458 L HRT ARTERY/VENTRICLE ANGIO: CPT | Mod: 26,,, | Performed by: INTERNAL MEDICINE

## 2025-05-08 PROCEDURE — C1769 GUIDE WIRE: HCPCS | Performed by: INTERNAL MEDICINE

## 2025-05-08 PROCEDURE — 99152 MOD SED SAME PHYS/QHP 5/>YRS: CPT | Mod: ,,, | Performed by: INTERNAL MEDICINE

## 2025-05-08 PROCEDURE — C1887 CATHETER, GUIDING: HCPCS | Performed by: INTERNAL MEDICINE

## 2025-05-08 PROCEDURE — 63600175 PHARM REV CODE 636 W HCPCS: Performed by: INTERNAL MEDICINE

## 2025-05-08 PROCEDURE — 25500020 PHARM REV CODE 255: Performed by: INTERNAL MEDICINE

## 2025-05-08 RX ORDER — LIDOCAINE HYDROCHLORIDE 10 MG/ML
INJECTION, SOLUTION EPIDURAL; INFILTRATION; INTRACAUDAL; PERINEURAL
Status: DISCONTINUED | OUTPATIENT
Start: 2025-05-08 | End: 2025-05-08 | Stop reason: HOSPADM

## 2025-05-08 RX ORDER — HEPARIN SODIUM 1000 [USP'U]/ML
INJECTION, SOLUTION INTRAVENOUS; SUBCUTANEOUS
Status: DISCONTINUED | OUTPATIENT
Start: 2025-05-08 | End: 2025-05-08 | Stop reason: HOSPADM

## 2025-05-08 RX ORDER — FENTANYL CITRATE 50 UG/ML
INJECTION, SOLUTION INTRAMUSCULAR; INTRAVENOUS
Status: DISCONTINUED | OUTPATIENT
Start: 2025-05-08 | End: 2025-05-08 | Stop reason: HOSPADM

## 2025-05-08 RX ORDER — SODIUM CHLORIDE 9 MG/ML
INJECTION, SOLUTION INTRAVENOUS CONTINUOUS
Status: ACTIVE | OUTPATIENT
Start: 2025-05-08 | End: 2025-05-08

## 2025-05-08 RX ORDER — SODIUM CHLORIDE 9 MG/ML
INJECTION, SOLUTION INTRAVENOUS CONTINUOUS
Status: DISCONTINUED | OUTPATIENT
Start: 2025-05-08 | End: 2025-05-08 | Stop reason: HOSPADM

## 2025-05-08 RX ORDER — ONDANSETRON 8 MG/1
8 TABLET, ORALLY DISINTEGRATING ORAL EVERY 8 HOURS PRN
Status: DISCONTINUED | OUTPATIENT
Start: 2025-05-08 | End: 2025-05-08 | Stop reason: HOSPADM

## 2025-05-08 RX ORDER — ACETAMINOPHEN 325 MG/1
650 TABLET ORAL EVERY 4 HOURS PRN
Status: DISCONTINUED | OUTPATIENT
Start: 2025-05-08 | End: 2025-05-08 | Stop reason: HOSPADM

## 2025-05-08 RX ORDER — MIDAZOLAM HYDROCHLORIDE 1 MG/ML
INJECTION INTRAMUSCULAR; INTRAVENOUS
Status: DISCONTINUED | OUTPATIENT
Start: 2025-05-08 | End: 2025-05-08 | Stop reason: HOSPADM

## 2025-05-08 NOTE — Clinical Note
The catheter was repositioned into the ostium   left main. An angiography was performed of the left coronary arteries. Multiple views were taken. The angiography was performed via power injection.  And removed.

## 2025-05-08 NOTE — Clinical Note
The catheter was inserted into the and was inserted over the wire into the left ventricle. Hemodynamics were performed.  and Pullback was recorded.  And removed.

## 2025-05-08 NOTE — DISCHARGE INSTRUCTIONS
Limit movement of the wrist.    Do not bend wrist or perform heavy lifting for 24 hours.   Remove dressing in 24 hours.   NO blood pressure cuff or venipuncture to affected arm for 24 hours.    If bleeding occurs, apply pressure to site for 20 minutes. Apply band aid once bleeding stops.  Call 911 if unable to stop bleeding with pressure.     Do not drive, drink alcohol, or sign legal documents for 24 hours, or if taking narcotic pain medication.      Fall Prevention  Millions of people fall every year and injure themselves. You may have had anesthesia or sedation which may increase your risk of falling. You may have health issues that put you at an increased risk of falling.     Here are ways to reduce your risk of falling.    Make your home safe by keeping walkways clear of objects you may trip over.  Use non-slip pads under rugs. Do not use area rugs or small throw rugs.  Use non-slip mats in bathtubs and showers.  Install handrails and lights on staircases.  Do not walk in poorly lit areas.  Do not stand on chairs or wobbly ladders.  Use caution when reaching overhead or looking upward. This position can cause a loss of balance.  Be sure your shoes fit properly, have non-slip bottoms and are in good condition.   Wear shoes both inside and out. Avoid going barefoot or wearing slippers.  Be cautious when going up and down stairs, curbs, and when walking on uneven sidewalks.  If your balance is poor, consider using a cane or walker.  If your fall was related to alcohol use, stop or limit alcohol intake.   If your fall was related to use of sleeping medicines, talk to your doctor about this. You may need to reduce your dosage at bedtime if you awaken during the night to go to the bathroom.    To reduce the need for nighttime bathroom trips:  Avoid drinking fluids for several hours before going to bed  Empty your bladder before going to bed  Men can keep a urinal at the bedside  Stay as active as you can. Balance,  flexibility, strength, and endurance all come from exercise. They all play a role in preventing falls. Ask your healthcare provider which types of activity are right for you.  Get your vision checked on a regular basis.  If you have pets, know where they are before you stand up or walk so you don't trip over them.  Use night lights.

## 2025-05-08 NOTE — DISCHARGE SUMMARY
[facility] - Cath Lab  Discharge Note  Short Stay    Procedure(s) (LRB):  Left heart cath (Left)    HPI:  Patient underwent cardiac catheterization secondary to cardiomyopathy and abnormal nuclear stress that showed moderate intensity, reversible perfusion abnormality with some fixed areas in the anteroapical wall.  Angiography revealed normal coronary arteries. Normal LVEDP.    OUTCOME: Patient tolerated treatment/procedure well without complication and is now ready for discharge.    DISPOSITION: Home or Self Care    FINAL DIAGNOSIS:  Abnormal nuclear stress test    FOLLOWUP: In clinic    DISCHARGE INSTRUCTIONS:    Discharge Procedure Orders   Diet general     Remove dressing in 24 hours     Call MD for:  temperature >100.4     Call MD for:  persistent nausea and vomiting     Call MD for:  severe uncontrolled pain     Call MD for:  difficulty breathing, headache or visual disturbances     Call MD for:  redness, tenderness, or signs of infection (pain, swelling, redness, odor or green/yellow discharge around incision site)     Call MD for:  hives     Call MD for:  persistent dizziness or light-headedness        TIME SPENT ON DISCHARGE: 30 minutes

## 2025-05-09 ENCOUNTER — LAB VISIT (OUTPATIENT)
Dept: LAB | Facility: HOSPITAL | Age: 45
End: 2025-05-09
Attending: INTERNAL MEDICINE
Payer: COMMERCIAL

## 2025-05-09 DIAGNOSIS — I48.19 PERSISTENT ATRIAL FIBRILLATION: ICD-10-CM

## 2025-05-09 DIAGNOSIS — Z79.01 CHRONIC ANTICOAGULATION: ICD-10-CM

## 2025-05-09 DIAGNOSIS — I50.42 CHRONIC COMBINED SYSTOLIC AND DIASTOLIC HEART FAILURE: ICD-10-CM

## 2025-05-09 DIAGNOSIS — Z01.818 PRE-OP TESTING: ICD-10-CM

## 2025-05-09 DIAGNOSIS — I42.9 CARDIOMYOPATHY, UNSPECIFIED TYPE: ICD-10-CM

## 2025-05-09 LAB
ANION GAP (OHS): 7 MMOL/L (ref 8–16)
APTT PPP: 31.1 SECONDS (ref 21–32)
BUN SERPL-MCNC: 16 MG/DL (ref 6–20)
CALCIUM SERPL-MCNC: 8.5 MG/DL (ref 8.7–10.5)
CHLORIDE SERPL-SCNC: 107 MMOL/L (ref 95–110)
CO2 SERPL-SCNC: 27 MMOL/L (ref 23–29)
CREAT SERPL-MCNC: 0.8 MG/DL (ref 0.5–1.4)
ERYTHROCYTE [DISTWIDTH] IN BLOOD BY AUTOMATED COUNT: 13.3 % (ref 11.5–14.5)
GFR SERPLBLD CREATININE-BSD FMLA CKD-EPI: >60 ML/MIN/1.73/M2
GLUCOSE SERPL-MCNC: 95 MG/DL (ref 70–110)
HCT VFR BLD AUTO: 48.7 % (ref 40–54)
HGB BLD-MCNC: 15.9 GM/DL (ref 14–18)
INR PPP: 1.1 (ref 0.8–1.2)
MCH RBC QN AUTO: 30.9 PG (ref 27–31)
MCHC RBC AUTO-ENTMCNC: 32.6 G/DL (ref 32–36)
MCV RBC AUTO: 95 FL (ref 82–98)
PLATELET # BLD AUTO: 215 K/UL (ref 150–450)
PMV BLD AUTO: 11.4 FL (ref 9.2–12.9)
POTASSIUM SERPL-SCNC: 4.6 MMOL/L (ref 3.5–5.1)
PROTHROMBIN TIME: 11.7 SECONDS (ref 9–12.5)
RBC # BLD AUTO: 5.14 M/UL (ref 4.6–6.2)
SODIUM SERPL-SCNC: 141 MMOL/L (ref 136–145)
WBC # BLD AUTO: 4.59 K/UL (ref 3.9–12.7)

## 2025-05-09 PROCEDURE — 85610 PROTHROMBIN TIME: CPT

## 2025-05-09 PROCEDURE — 85027 COMPLETE CBC AUTOMATED: CPT

## 2025-05-09 PROCEDURE — 80048 BASIC METABOLIC PNL TOTAL CA: CPT

## 2025-05-09 PROCEDURE — 85730 THROMBOPLASTIN TIME PARTIAL: CPT

## 2025-05-09 PROCEDURE — 36415 COLL VENOUS BLD VENIPUNCTURE: CPT | Mod: PO

## 2025-05-14 ENCOUNTER — TELEPHONE (OUTPATIENT)
Dept: ELECTROPHYSIOLOGY | Facility: CLINIC | Age: 45
End: 2025-05-14
Payer: COMMERCIAL

## 2025-05-14 NOTE — TELEPHONE ENCOUNTER
Spoke to patient    CONFIRMED procedure arrival time of 7:30 am    Reiterated instructions including:    -Directions to check in desk    -NPO after midnight night prior to procedure. Fasting upon arrival to the hospital the day of the procedure. Patient allowed to drink water up until 2 hours prior to arrival due to IV fluid shortage.     -High importance of HOLDING Entresto, Farxiga, Lasix, and Eliquis on day of procedure (last dose on 5/15/25)     - Confirms no new meds prescribed or med changes since scheduling -     -Pre-procedure LABS Reviewed, no alerts noted    -Confirmed absence or presence of implanted device/stimulator n/a    -Confirmed no recent or current fever, cough, or shortness of breath .    -Confirmed no redness, rash, irritation, or yeast infection to skin/ chest / groin area.     Patient verbalized understanding of above, denies any further questions and appreciated the call.

## 2025-05-16 ENCOUNTER — HOSPITAL ENCOUNTER (OUTPATIENT)
Dept: CARDIOLOGY | Facility: HOSPITAL | Age: 45
Discharge: HOME | End: 2025-05-16
Attending: INTERNAL MEDICINE | Admitting: INTERNAL MEDICINE
Payer: COMMERCIAL

## 2025-05-16 ENCOUNTER — HOSPITAL ENCOUNTER (OUTPATIENT)
Facility: HOSPITAL | Age: 45
Discharge: HOME | End: 2025-05-16
Attending: INTERNAL MEDICINE | Admitting: INTERNAL MEDICINE
Payer: COMMERCIAL

## 2025-05-16 ENCOUNTER — ANESTHESIA (OUTPATIENT)
Dept: MEDSURG UNIT | Facility: HOSPITAL | Age: 45
End: 2025-05-16
Payer: COMMERCIAL

## 2025-05-16 ENCOUNTER — ANESTHESIA EVENT (OUTPATIENT)
Dept: MEDSURG UNIT | Facility: HOSPITAL | Age: 45
End: 2025-05-16
Payer: COMMERCIAL

## 2025-05-16 VITALS
DIASTOLIC BLOOD PRESSURE: 69 MMHG | RESPIRATION RATE: 13 BRPM | HEART RATE: 74 BPM | WEIGHT: 268 LBS | HEIGHT: 67 IN | TEMPERATURE: 98 F | OXYGEN SATURATION: 99 % | SYSTOLIC BLOOD PRESSURE: 110 MMHG | BODY MASS INDEX: 42.06 KG/M2

## 2025-05-16 VITALS — HEART RATE: 68 BPM

## 2025-05-16 DIAGNOSIS — I42.9 CARDIOMYOPATHY, UNSPECIFIED TYPE: ICD-10-CM

## 2025-05-16 DIAGNOSIS — I48.19 PERSISTENT ATRIAL FIBRILLATION: ICD-10-CM

## 2025-05-16 DIAGNOSIS — Z79.01 CHRONIC ANTICOAGULATION: ICD-10-CM

## 2025-05-16 DIAGNOSIS — I48.91 ATRIAL FIBRILLATION: ICD-10-CM

## 2025-05-16 DIAGNOSIS — Z01.818 PRE-OP TESTING: ICD-10-CM

## 2025-05-16 DIAGNOSIS — I48.91 AF (ATRIAL FIBRILLATION): ICD-10-CM

## 2025-05-16 DIAGNOSIS — I50.42 CHRONIC COMBINED SYSTOLIC AND DIASTOLIC HEART FAILURE: ICD-10-CM

## 2025-05-16 DIAGNOSIS — I49.9 ARRHYTHMIA: ICD-10-CM

## 2025-05-16 LAB
AORTIC SIZE INDEX (SOV): 1.3 CM/M2
AORTIC SIZE INDEX: 1.4 CM/M2
ASCENDING AORTA: 3.2 CM
BSA FOR ECHO PROCEDURE: 2.4 M2
OHS QRS DURATION: 92 MS
OHS QRS DURATION: 96 MS
OHS QTC CALCULATION: 470 MS
OHS QTC CALCULATION: 477 MS
POC ACTIVATED CLOTTING TIME K: 112 SEC (ref 74–137)
POC ACTIVATED CLOTTING TIME K: 118 SEC (ref 74–137)
POC ACTIVATED CLOTTING TIME K: 297 SEC (ref 74–137)
POC ACTIVATED CLOTTING TIME K: 297 SEC (ref 74–137)
POC ACTIVATED CLOTTING TIME K: 331 SEC (ref 74–137)
SAMPLE: ABNORMAL
SAMPLE: NORMAL
SAMPLE: NORMAL
SINUS: 3 CM
STJ: 2.8 CM

## 2025-05-16 PROCEDURE — 92960 CARDIOVERSION ELECTRIC EXT: CPT | Mod: XU | Performed by: INTERNAL MEDICINE

## 2025-05-16 PROCEDURE — 27201423 OPTIME MED/SURG SUP & DEVICES STERILE SUPPLY: Performed by: INTERNAL MEDICINE

## 2025-05-16 PROCEDURE — 93325 DOPPLER ECHO COLOR FLOW MAPG: CPT | Mod: 26,,, | Performed by: INTERNAL MEDICINE

## 2025-05-16 PROCEDURE — C1769 GUIDE WIRE: HCPCS | Performed by: INTERNAL MEDICINE

## 2025-05-16 PROCEDURE — 25000003 PHARM REV CODE 250: Performed by: STUDENT IN AN ORGANIZED HEALTH CARE EDUCATION/TRAINING PROGRAM

## 2025-05-16 PROCEDURE — C1753 CATH, INTRAVAS ULTRASOUND: HCPCS | Performed by: INTERNAL MEDICINE

## 2025-05-16 PROCEDURE — 93657 TX L/R ATRIAL FIB ADDL: CPT | Mod: ,,, | Performed by: INTERNAL MEDICINE

## 2025-05-16 PROCEDURE — 63600175 PHARM REV CODE 636 W HCPCS: Performed by: NURSE ANESTHETIST, CERTIFIED REGISTERED

## 2025-05-16 PROCEDURE — 25000003 PHARM REV CODE 250: Performed by: NURSE ANESTHETIST, CERTIFIED REGISTERED

## 2025-05-16 PROCEDURE — 37000009 HC ANESTHESIA EA ADD 15 MINS: Performed by: INTERNAL MEDICINE

## 2025-05-16 PROCEDURE — 93657 TX L/R ATRIAL FIB ADDL: CPT | Performed by: INTERNAL MEDICINE

## 2025-05-16 PROCEDURE — 27201037 HC PRESSURE MONITORING SET UP

## 2025-05-16 PROCEDURE — 93010 ELECTROCARDIOGRAM REPORT: CPT | Mod: ,,, | Performed by: INTERNAL MEDICINE

## 2025-05-16 PROCEDURE — 37000008 HC ANESTHESIA 1ST 15 MINUTES: Performed by: INTERNAL MEDICINE

## 2025-05-16 PROCEDURE — C1766 INTRO/SHEATH,STRBLE,NON-PEEL: HCPCS | Performed by: INTERNAL MEDICINE

## 2025-05-16 PROCEDURE — 93312 ECHO TRANSESOPHAGEAL: CPT

## 2025-05-16 PROCEDURE — 92960 CARDIOVERSION ELECTRIC EXT: CPT | Mod: XU,,, | Performed by: INTERNAL MEDICINE

## 2025-05-16 PROCEDURE — 63600175 PHARM REV CODE 636 W HCPCS: Performed by: INTERNAL MEDICINE

## 2025-05-16 PROCEDURE — 93005 ELECTROCARDIOGRAM TRACING: CPT

## 2025-05-16 PROCEDURE — 93656 COMPRE EP EVAL ABLTJ ATR FIB: CPT | Mod: ,,, | Performed by: INTERNAL MEDICINE

## 2025-05-16 PROCEDURE — C1887 CATHETER, GUIDING: HCPCS | Performed by: INTERNAL MEDICINE

## 2025-05-16 PROCEDURE — 93320 DOPPLER ECHO COMPLETE: CPT | Mod: 26,,, | Performed by: INTERNAL MEDICINE

## 2025-05-16 PROCEDURE — C1730 CATH, EP, 19 OR FEW ELECT: HCPCS | Performed by: INTERNAL MEDICINE

## 2025-05-16 PROCEDURE — 93010 ELECTROCARDIOGRAM REPORT: CPT | Mod: 76,,, | Performed by: INTERNAL MEDICINE

## 2025-05-16 PROCEDURE — C1894 INTRO/SHEATH, NON-LASER: HCPCS | Performed by: INTERNAL MEDICINE

## 2025-05-16 PROCEDURE — C1733 CATH, EP, OTHR THAN COOL-TIP: HCPCS | Performed by: INTERNAL MEDICINE

## 2025-05-16 PROCEDURE — 25000003 PHARM REV CODE 250: Performed by: INTERNAL MEDICINE

## 2025-05-16 PROCEDURE — 93656 COMPRE EP EVAL ABLTJ ATR FIB: CPT | Performed by: INTERNAL MEDICINE

## 2025-05-16 PROCEDURE — 93312 ECHO TRANSESOPHAGEAL: CPT | Mod: 26,,, | Performed by: INTERNAL MEDICINE

## 2025-05-16 RX ORDER — PROPOFOL 10 MG/ML
VIAL (ML) INTRAVENOUS
Status: DISCONTINUED | OUTPATIENT
Start: 2025-05-16 | End: 2025-05-16

## 2025-05-16 RX ORDER — HEPARIN SODIUM 10000 [USP'U]/100ML
INJECTION, SOLUTION INTRAVENOUS CONTINUOUS PRN
Status: DISCONTINUED | OUTPATIENT
Start: 2025-05-16 | End: 2025-05-16

## 2025-05-16 RX ORDER — DIPHENHYDRAMINE HYDROCHLORIDE 50 MG/ML
25 INJECTION, SOLUTION INTRAMUSCULAR; INTRAVENOUS EVERY 6 HOURS PRN
Status: DISCONTINUED | OUTPATIENT
Start: 2025-05-16 | End: 2025-05-16 | Stop reason: HOSPADM

## 2025-05-16 RX ORDER — ROCURONIUM BROMIDE 10 MG/ML
INJECTION, SOLUTION INTRAVENOUS
Status: DISCONTINUED | OUTPATIENT
Start: 2025-05-16 | End: 2025-05-16

## 2025-05-16 RX ORDER — DEXMEDETOMIDINE HYDROCHLORIDE 100 UG/ML
INJECTION, SOLUTION INTRAVENOUS
Status: DISCONTINUED | OUTPATIENT
Start: 2025-05-16 | End: 2025-05-16

## 2025-05-16 RX ORDER — ACETAMINOPHEN 325 MG/1
650 TABLET ORAL EVERY 4 HOURS PRN
Status: DISCONTINUED | OUTPATIENT
Start: 2025-05-16 | End: 2025-05-16 | Stop reason: HOSPADM

## 2025-05-16 RX ORDER — HEPARIN SOD,PORCINE/0.9 % NACL 1000/500ML
INTRAVENOUS SOLUTION INTRAVENOUS
Status: DISCONTINUED | OUTPATIENT
Start: 2025-05-16 | End: 2025-05-16 | Stop reason: HOSPADM

## 2025-05-16 RX ORDER — ONDANSETRON HYDROCHLORIDE 2 MG/ML
4 INJECTION, SOLUTION INTRAVENOUS ONCE AS NEEDED
Status: DISCONTINUED | OUTPATIENT
Start: 2025-05-16 | End: 2025-05-16 | Stop reason: HOSPADM

## 2025-05-16 RX ORDER — DEXAMETHASONE SODIUM PHOSPHATE 4 MG/ML
INJECTION, SOLUTION INTRA-ARTICULAR; INTRALESIONAL; INTRAMUSCULAR; INTRAVENOUS; SOFT TISSUE
Status: DISCONTINUED | OUTPATIENT
Start: 2025-05-16 | End: 2025-05-16

## 2025-05-16 RX ORDER — PHENYLEPHRINE HCL IN 0.9% NACL 1 MG/10 ML
SYRINGE (ML) INTRAVENOUS
Status: DISCONTINUED | OUTPATIENT
Start: 2025-05-16 | End: 2025-05-16

## 2025-05-16 RX ORDER — FENTANYL CITRATE 50 UG/ML
25 INJECTION, SOLUTION INTRAMUSCULAR; INTRAVENOUS EVERY 5 MIN PRN
Status: DISCONTINUED | OUTPATIENT
Start: 2025-05-16 | End: 2025-05-16 | Stop reason: HOSPADM

## 2025-05-16 RX ORDER — HYDROMORPHONE HYDROCHLORIDE 1 MG/ML
0.2 INJECTION, SOLUTION INTRAMUSCULAR; INTRAVENOUS; SUBCUTANEOUS EVERY 5 MIN PRN
Status: DISCONTINUED | OUTPATIENT
Start: 2025-05-16 | End: 2025-05-16 | Stop reason: HOSPADM

## 2025-05-16 RX ORDER — FENTANYL CITRATE 50 UG/ML
INJECTION, SOLUTION INTRAMUSCULAR; INTRAVENOUS
Status: DISCONTINUED | OUTPATIENT
Start: 2025-05-16 | End: 2025-05-16

## 2025-05-16 RX ORDER — EPHEDRINE SULFATE 50 MG/ML
INJECTION, SOLUTION INTRAVENOUS
Status: DISCONTINUED | OUTPATIENT
Start: 2025-05-16 | End: 2025-05-16

## 2025-05-16 RX ORDER — MIDAZOLAM HYDROCHLORIDE 1 MG/ML
INJECTION INTRAMUSCULAR; INTRAVENOUS
Status: DISCONTINUED | OUTPATIENT
Start: 2025-05-16 | End: 2025-05-16

## 2025-05-16 RX ORDER — ONDANSETRON HYDROCHLORIDE 2 MG/ML
INJECTION, SOLUTION INTRAVENOUS
Status: DISCONTINUED | OUTPATIENT
Start: 2025-05-16 | End: 2025-05-16

## 2025-05-16 RX ORDER — HEPARIN SODIUM 1000 [USP'U]/ML
INJECTION, SOLUTION INTRAVENOUS; SUBCUTANEOUS
Status: DISCONTINUED | OUTPATIENT
Start: 2025-05-16 | End: 2025-05-16

## 2025-05-16 RX ORDER — LIDOCAINE HYDROCHLORIDE 20 MG/ML
INJECTION, SOLUTION INFILTRATION; PERINEURAL
Status: DISCONTINUED | OUTPATIENT
Start: 2025-05-16 | End: 2025-05-16 | Stop reason: HOSPADM

## 2025-05-16 RX ORDER — PROTAMINE SULFATE 10 MG/ML
INJECTION, SOLUTION INTRAVENOUS
Status: DISCONTINUED | OUTPATIENT
Start: 2025-05-16 | End: 2025-05-16

## 2025-05-16 RX ORDER — PROCHLORPERAZINE EDISYLATE 5 MG/ML
5 INJECTION INTRAMUSCULAR; INTRAVENOUS EVERY 30 MIN PRN
Status: DISCONTINUED | OUTPATIENT
Start: 2025-05-16 | End: 2025-05-16 | Stop reason: HOSPADM

## 2025-05-16 RX ORDER — ATROPINE SULFATE 0.1 MG/ML
INJECTION INTRAVENOUS
Status: DISCONTINUED | OUTPATIENT
Start: 2025-05-16 | End: 2025-05-16

## 2025-05-16 RX ORDER — KETAMINE HCL IN 0.9 % NACL 50 MG/5 ML
SYRINGE (ML) INTRAVENOUS
Status: DISCONTINUED | OUTPATIENT
Start: 2025-05-16 | End: 2025-05-16

## 2025-05-16 RX ADMIN — PROTAMINE SULFATE 10 MG: 10 INJECTION, SOLUTION INTRAVENOUS at 12:05

## 2025-05-16 RX ADMIN — ACETAMINOPHEN 650 MG: 325 TABLET ORAL at 01:05

## 2025-05-16 RX ADMIN — PHENYLEPHRINE HYDROCHLORIDE 0.5 MCG/KG/MIN: 10 INJECTION INTRAVENOUS at 10:05

## 2025-05-16 RX ADMIN — ROCURONIUM BROMIDE 30 MG: 10 INJECTION INTRAVENOUS at 11:05

## 2025-05-16 RX ADMIN — ROCURONIUM BROMIDE 100 MG: 10 INJECTION INTRAVENOUS at 10:05

## 2025-05-16 RX ADMIN — SODIUM CHLORIDE: 0.9 INJECTION, SOLUTION INTRAVENOUS at 10:05

## 2025-05-16 RX ADMIN — PROPOFOL 150 MG: 10 INJECTION, EMULSION INTRAVENOUS at 10:05

## 2025-05-16 RX ADMIN — DEXAMETHASONE SODIUM PHOSPHATE 4 MG: 4 INJECTION INTRA-ARTICULAR; INTRALESIONAL; INTRAMUSCULAR; INTRAVENOUS; SOFT TISSUE at 12:05

## 2025-05-16 RX ADMIN — EPHEDRINE SULFATE 10 MG: 50 INJECTION INTRAVENOUS at 11:05

## 2025-05-16 RX ADMIN — HEPARIN SODIUM 18000 UNITS: 1000 INJECTION INTRAVENOUS; SUBCUTANEOUS at 11:05

## 2025-05-16 RX ADMIN — MIDAZOLAM HYDROCHLORIDE 2 MG: 2 INJECTION, SOLUTION INTRAMUSCULAR; INTRAVENOUS at 10:05

## 2025-05-16 RX ADMIN — SUGAMMADEX 200 MG: 100 INJECTION, SOLUTION INTRAVENOUS at 12:05

## 2025-05-16 RX ADMIN — Medication 100 MCG: at 10:05

## 2025-05-16 RX ADMIN — FENTANYL CITRATE 100 MCG: 50 INJECTION, SOLUTION INTRAMUSCULAR; INTRAVENOUS at 10:05

## 2025-05-16 RX ADMIN — PROTAMINE SULFATE 80 MG: 10 INJECTION, SOLUTION INTRAVENOUS at 12:05

## 2025-05-16 RX ADMIN — HEPARIN SODIUM 3000 UNITS: 1000 INJECTION INTRAVENOUS; SUBCUTANEOUS at 11:05

## 2025-05-16 RX ADMIN — ONDANSETRON 4 MG: 2 INJECTION INTRAMUSCULAR; INTRAVENOUS at 12:05

## 2025-05-16 RX ADMIN — Medication 20 MG: at 10:05

## 2025-05-16 RX ADMIN — DEXMEDETOMIDINE 16 MCG: 200 INJECTION, SOLUTION INTRAVENOUS at 12:05

## 2025-05-16 RX ADMIN — HEPARIN SODIUM 5500 UNITS: 1000 INJECTION INTRAVENOUS; SUBCUTANEOUS at 11:05

## 2025-05-16 RX ADMIN — ATROPINE SULFATE 0.5 MG: 0.1 INJECTION INTRAVENOUS at 11:05

## 2025-05-16 RX ADMIN — HEPARIN SODIUM AND DEXTROSE 12 UNITS/KG/HR: 10000; 5 INJECTION INTRAVENOUS at 11:05

## 2025-05-16 NOTE — HOSPITAL COURSE
Patient underwent successful EPS + PVI/PWI with PFA for treatment of atrial fibrillation. No evidence of intra- or post-procedure complications. Post-ablation ECG shows NSR, and no acute abnormalities.      EP medications at discharge:   Antiarrhythmics and/or AVN agents: unchanged. Continue Amiodarone during blanking period   Patient was instructed to continue their oral anticoagulation as previously prescribed   Patient was instructed to take ibuprofen 800 mg TID x 3 days for pericarditis.      Groin access sites without hematoma or bleeding. Activity restrictions given to patient. Instructed to seek medical attention for shortness of breath, chest discomfort not alleviated with NSAIDs, bleeding/hematoma formation at the access sites, acute onset of neurologic symptoms, N/V, or hematemesis. At discharge the patient denied CP, SOB, access site bleeding/hematoma, or any other complaints or evidence of complications.

## 2025-05-16 NOTE — SUBJECTIVE & OBJECTIVE
Past Medical History:   Diagnosis Date    Anticoagulant long-term use     atrial fibrillation     CHF (congestive heart failure)     Severe obesity (BMI >= 40) 08/22/2020       Past Surgical History:   Procedure Laterality Date    ECHOCARDIOGRAM,TRANSESOPHAGEAL N/A 3/14/2025    Procedure: Transesophageal echo (JOSEF) intra-procedure log documentation;  Surgeon: Rachael Pryor MD;  Location: [facility] EP LAB;  Service: Cardiology;  Laterality: N/A;    LEFT HEART CATHETERIZATION Left 5/8/2025    Procedure: Left heart cath;  Surgeon: Kevin Ortiz MD;  Location: [facility] CATH LAB;  Service: Cardiology;  Laterality: Left;  RN PREOP 5/5/2025---BMI--44.29    TREATMENT OF CARDIAC ARRHYTHMIA N/A 3/14/2025    Procedure: Cardioversion or Defibrillation;  Surgeon: Michael Appiah MD;  Location: [facility] EP LAB;  Service: Cardiology;  Laterality: N/A;  AF, JOSEF, DCCV, MAC, NM, 3 Prep    TREATMENT OF CARDIAC ARRHYTHMIA N/A 3/28/2025    Procedure: Cardioversion or Defibrillation;  Surgeon: Michael Appiah MD;  Location: [facility] EP LAB;  Service: Cardiology;  Laterality: N/A;  AF, JOSEF (cx if 100% OAC compliant), DCCV, MAC, NM, 3 Prep       Review of patient's allergies indicates:  No Known Allergies    No current facility-administered medications on file prior to encounter.     Current Outpatient Medications on File Prior to Encounter   Medication Sig    amiodarone (PACERONE) 200 MG Tab Take 1 tablet (200 mg total) by mouth once daily.    apixaban (ELIQUIS) 5 mg Tab Take 1 tablet (5 mg total) by mouth 2 (two) times daily.    dapagliflozin propanediol (FARXIGA) 5 mg Tab tablet Take 1 tablet (5 mg total) by mouth once daily.    furosemide (LASIX) 20 MG tablet Take 1 tablet (20 mg total) by mouth once daily. Take additional 20-mg tablet for weight gain over 2 lbs/day or 5 lbs/week or Edema.    metoprolol succinate (TOPROL-XL) 25 MG 24 hr tablet Take 1 tablet (25 mg total) by mouth 2 (two) times daily.    sacubitriL-valsartan (ENTRESTO) 24-26  mg per tablet Take 1 tablet by mouth 2 (two) times daily.    amiodarone (PACERONE) 200 MG Tab Take 2 tablets (400 mg total) by mouth 2 (two) times daily for 14 days, THEN 1 tablet (200 mg total) once daily.    cetirizine (ZYRTEC) 10 MG tablet Take 1 tablet (10 mg total) by mouth once daily.    fluticasone propionate (FLONASE) 50 mcg/actuation nasal spray 1 spray (50 mcg total) by Each Nostril route once daily.     Family History       Problem Relation (Age of Onset)    Atrial fibrillation Father    Hypertension Father          Tobacco Use    Smoking status: Never    Smokeless tobacco: Never   Substance and Sexual Activity    Alcohol use: Yes     Comment: socially    Drug use: Never    Sexual activity: Yes     Partners: Female     Review of Systems   All other systems reviewed and are negative.    Objective:     Vital Signs (Most Recent):  Temp: 97.9 °F (36.6 °C) (05/16/25 0819)  Pulse: 97 (05/16/25 0819)  Resp: 18 (05/16/25 0819)  BP: 120/81 (05/16/25 0824)  SpO2: 98 % (05/16/25 0819) Vital Signs (24h Range):  Temp:  [97.9 °F (36.6 °C)] 97.9 °F (36.6 °C)  Pulse:  [97] 97  Resp:  [18] 18  SpO2:  [98 %] 98 %  BP: (106-120)/(81-89) 120/81       Weight: 121.6 kg (268 lb)  Body mass index is 41.97 kg/m².    SpO2: 98 %        Physical Exam  Vitals and nursing note reviewed.   Constitutional:       Appearance: Normal appearance.   HENT:      Head: Normocephalic.   Cardiovascular:      Rate and Rhythm: Tachycardia present. Rhythm irregular.      Pulses: Normal pulses.      Heart sounds: Normal heart sounds.   Pulmonary:      Effort: Pulmonary effort is normal.      Breath sounds: Normal breath sounds.   Musculoskeletal:      Right lower leg: No edema.      Left lower leg: No edema.   Skin:     General: Skin is warm.   Neurological:      General: No focal deficit present.      Mental Status: He is alert.            Significant Labs: All pertinent lab results from the last 24 hours have been reviewed.

## 2025-05-16 NOTE — NURSING TRANSFER
Nursing Transfer Note      5/16/2025   3:25 PM    Nurse giving handoff: Livia PACU RN  Nurse receiving handoff: Miguleina SSCU RN    [facility]    Transfer via stretcher    Transfer with cardiac monitoring    Transported by RN    Telemetry: Box Number [id number], HR 62 NSR  Order for Tele Monitor? Yes    Medicines sent: none    Any special needs or follow-up needed: none    Patient belongings transferred with patient: No    Chart send with patient: Yes    Notified: spouse    Patient reassessed at: [id number]
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
## 2025-05-16 NOTE — H&P
[facility] - Short Stay Cardiac Unit  Cardiac Electrophysiology  History and Physical     Admission Date: 5/16/2025  Code Status: Prior   Attending Provider: Michael Appiah MD   Principal Problem:<principal problem not specified>    Subjective:     Chief Complaint:  af     HPI:  Mr. Krause is a 45 year old with  Tachycardia-induced cardiomyopathy, paroxysmal atrial fib who presents today for PVI with PFA. First noted to be in an irregular rhythm by Dr. Appiah at a baseball game. Admitted to [facility]. He underwent JOSEF/DCCV. EF on JOSEF was 15-20% prior then roughly 30% post DCCV. On 3/14 Echocardiogram showed EF 15-20%. Mild MARILU. Moderate MRDennys Discharged home on GDMT. Placed on Amiodarone. Patient underwent repeat DCCV in 3/28.     [id number] 2  Anticoagulant/antiplatelets: eliquis, compliant  ECG: atrial [id number] y.o. male with atrial fibrillation, acute systolic HF/CMP.         Past Medical History:   Diagnosis Date    Anticoagulant long-term use     atrial fibrillation     CHF (congestive heart failure)     Severe obesity (BMI >= 40) 08/22/2020       Past Surgical History:   Procedure Laterality Date    ECHOCARDIOGRAM,TRANSESOPHAGEAL N/A 3/14/2025    Procedure: Transesophageal echo (JOSEF) intra-procedure log documentation;  Surgeon: Rachael Pryor MD;  Location: [facility] EP LAB;  Service: Cardiology;  Laterality: N/A;    LEFT HEART CATHETERIZATION Left 5/8/2025    Procedure: Left heart cath;  Surgeon: Kevin Ortiz MD;  Location: [facility] CATH LAB;  Service: Cardiology;  Laterality: Left;  RN PREOP 5/5/2025---BMI--44.29    TREATMENT OF CARDIAC ARRHYTHMIA N/A 3/14/2025    Procedure: Cardioversion or Defibrillation;  Surgeon: Michael Appiah MD;  Location: [facility] EP LAB;  Service: Cardiology;  Laterality: N/A;  AF, JOSEF, DCCV, MAC, ID, 3 Prep    TREATMENT OF CARDIAC ARRHYTHMIA N/A 3/28/2025    Procedure: Cardioversion or Defibrillation;  Surgeon: Michael Appiah MD;  Location: [facility] EP LAB;  Service: Cardiology;   Laterality: N/A;  AF, JOSEF (cx if 100% OAC compliant), DCCV, MAC, MS, 3 Prep       Review of patient's allergies indicates:  No Known Allergies    No current facility-administered medications on file prior to encounter.     Current Outpatient Medications on File Prior to Encounter   Medication Sig    amiodarone (PACERONE) 200 MG Tab Take 1 tablet (200 mg total) by mouth once daily.    apixaban (ELIQUIS) 5 mg Tab Take 1 tablet (5 mg total) by mouth 2 (two) times daily.    dapagliflozin propanediol (FARXIGA) 5 mg Tab tablet Take 1 tablet (5 mg total) by mouth once daily.    furosemide (LASIX) 20 MG tablet Take 1 tablet (20 mg total) by mouth once daily. Take additional 20-mg tablet for weight gain over 2 lbs/day or 5 lbs/week or Edema.    metoprolol succinate (TOPROL-XL) 25 MG 24 hr tablet Take 1 tablet (25 mg total) by mouth 2 (two) times daily.    sacubitriL-valsartan (ENTRESTO) 24-26 mg per tablet Take 1 tablet by mouth 2 (two) times daily.    amiodarone (PACERONE) 200 MG Tab Take 2 tablets (400 mg total) by mouth 2 (two) times daily for 14 days, THEN 1 tablet (200 mg total) once daily.    cetirizine (ZYRTEC) 10 MG tablet Take 1 tablet (10 mg total) by mouth once daily.    fluticasone propionate (FLONASE) 50 mcg/actuation nasal spray 1 spray (50 mcg total) by Each Nostril route once daily.     Family History       Problem Relation (Age of Onset)    Atrial fibrillation Father    Hypertension Father          Tobacco Use    Smoking status: Never    Smokeless tobacco: Never   Substance and Sexual Activity    Alcohol use: Yes     Comment: socially    Drug use: Never    Sexual activity: Yes     Partners: Female     Review of Systems   All other systems reviewed and are negative.    Objective:     Vital Signs (Most Recent):  Temp: 97.9 °F (36.6 °C) (05/16/25 0819)  Pulse: 97 (05/16/25 0819)  Resp: 18 (05/16/25 0819)  BP: 120/81 (05/16/25 0824)  SpO2: 98 % (05/16/25 0819) Vital Signs (24h Range):  Temp:  [97.9 °F (36.6 °C)]  97.9 °F (36.6 °C)  Pulse:  [97] 97  Resp:  [18] 18  SpO2:  [98 %] 98 %  BP: (106-120)/(81-89) 120/81       Weight: 121.6 kg (268 lb)  Body mass index is 41.97 kg/m².    SpO2: 98 %        Physical Exam  Vitals and nursing note reviewed.   Constitutional:       Appearance: Normal appearance.   HENT:      Head: Normocephalic.   Cardiovascular:      Rate and Rhythm: Tachycardia present. Rhythm irregular.      Pulses: Normal pulses.      Heart sounds: Normal heart sounds.   Pulmonary:      Effort: Pulmonary effort is normal.      Breath sounds: Normal breath sounds.   Musculoskeletal:      Right lower leg: No edema.      Left lower leg: No edema.   Skin:     General: Skin is warm.   Neurological:      General: No focal deficit present.      Mental Status: He is alert.            Significant Labs: All pertinent lab results from the last 24 hours have been reviewed.    Assessment and Plan:     Persistent atrial fibrillation  Persistent AF with systolic heart failure/CMP s/p DCCV with recurrence of AF 2 days into amiodarone load. Here for PVI with PFA.     We discussed risks and benefits of PVI, at length. Our discussion included, but was not limited to the risk of death, infection, bleeding, stroke, MI, cardiac perforation, embolism, cardiac tamponade, vascular injury, valvular injury, AE fistula (RFA), injury to phrenic nerve (RFA), pulmonary vein stenosis (RFA), rhabdomyolysis (PFA), hemolysis (PFA) and other organic injury including the possibility for need for surgery or pacemaker implantation. He would elect for PVI with PFA. Will discuss timing with his work schedule.         Bhavana Bucio MD  Cardiac Electrophysiology  [facility] - Short Stay Cardiac Unit

## 2025-05-16 NOTE — H&P
[facility]  JOSEF Consult      Danilo Krause  YOB: 1980  Medical Record Number:  [id number]  Attending Physician:  Michael Appiah MD   Current Principal Problem:  <principal problem not specified>    History       HPI  Patient is a 45 year old with  Tachycardia-induced cardiomyopathy, paroxysmal atrial fib who presents today for PVI with PFA. . Dr. Appiah at a softball game. He was noted to be in an irregular rhythm. Admitted to [facility]. He underwent JOSEF/DCCV. EF on JOSEF was 15-20% prior then roughly 30% post DCCV. On 3/14 Echocardiogram showed EF 15-20%. Mild MARILU. Moderate MR. Discharged home on GDMT. Placed on Amiodarone. Patient underwent repeat DCCV in 3/28.      [id number] 2  Anticoagulant/antiplatelets: eliquis, compliant  ECG: atrial fibrillation    Dysphagia or odynophagia:  No  Liver Disease, esophageal disease, or known varices:  No  Upper GI Bleeding: No  Snoring:  No  Sleep Apnea:  No  Prior neck surgery or radiation:  No  History of anesthetic difficulties:  No  Family history of anesthetic difficulties:  No  Last oral intake: yesterday before midnight  Able to move neck in all directions:  Yes    Medications - Outpatient  Prior to Admission medications    Medication Sig Start Date End Date Taking? Authorizing Provider   amiodarone (PACERONE) 200 MG Tab Take 2 tablets (400 mg total) by mouth 2 (two) times daily for 14 days, THEN 1 tablet (200 mg total) once daily. 3/15/25 3/29/26  Liz Neff MD   amiodarone (PACERONE) 200 MG Tab Take 1 tablet (200 mg total) by mouth once daily. 3/29/25 3/29/26  Ray, Lynnette, FNP-C   apixaban (ELIQUIS) 5 mg Tab Take 1 tablet (5 mg total) by mouth 2 (two) times daily. 3/14/25   Michael Appiah MD   cetirizine (ZYRTEC) 10 MG tablet Take 1 tablet (10 mg total) by mouth once daily. 3/8/25 5/5/25  Nannette Gaspar NP   dapagliflozin propanediol (FARXIGA) 5 mg Tab tablet Take 1 tablet (5 mg total) by mouth once daily. 4/1/25  4/1/26  Kevin Ortiz MD   fluticasone propionate (FLONASE) 50 mcg/actuation nasal spray 1 spray (50 mcg total) by Each Nostril route once daily. 3/8/25   Nannette Gaspar NP   furosemide (LASIX) 20 MG tablet Take 1 tablet (20 mg total) by mouth once daily. Take additional 20-mg tablet for weight gain over 2 lbs/day or 5 lbs/week or Edema. 3/15/25 3/15/26  Liz Neff MD   metoprolol succinate (TOPROL-XL) 25 MG 24 hr tablet Take 1 tablet (25 mg total) by mouth 2 (two) times daily. 3/19/25 3/19/26  Michael Appiah MD   sacubitriL-valsartan (ENTRESTO) 24-26 mg per tablet Take 1 tablet by mouth 2 (two) times daily. 4/1/25 4/1/26  Kevin Ortiz MD       Medications - Current  Scheduled Meds:  Continuous Infusions:    Allergies  Review of patient's allergies indicates:  No Known Allergies  Past Medical History  Past Medical History:   Diagnosis Date    Anticoagulant long-term use     atrial fibrillation     CHF (congestive heart failure)     Severe obesity (BMI >= 40) 08/22/2020     Past Surgical History  Past Surgical History:   Procedure Laterality Date    ECHOCARDIOGRAM,TRANSESOPHAGEAL N/A 3/14/2025    Procedure: Transesophageal echo (JOSEF) intra-procedure log documentation;  Surgeon: Rachael Pryor MD;  Location: [facility] EP LAB;  Service: Cardiology;  Laterality: N/A;    LEFT HEART CATHETERIZATION Left 5/8/2025    Procedure: Left heart cath;  Surgeon: Kevin Ortiz MD;  Location: [facility] CATH LAB;  Service: Cardiology;  Laterality: Left;  RN PREOP 5/5/2025---BMI--44.29    TREATMENT OF CARDIAC ARRHYTHMIA N/A 3/14/2025    Procedure: Cardioversion or Defibrillation;  Surgeon: Michael Appiah MD;  Location: [facility] EP LAB;  Service: Cardiology;  Laterality: N/A;  AF, JOSEF, DCCV, MAC, MT, 3 Prep    TREATMENT OF CARDIAC ARRHYTHMIA N/A 3/28/2025    Procedure: Cardioversion or Defibrillation;  Surgeon: Michael Appiah MD;  Location: [facility] EP LAB;  Service: Cardiology;  Laterality: N/A;  AF, JOSEF (cx if 100% OAC  compliant), DCCV, MAC, IN, 3 Prep     ROS  10 point ROS performed and negative except as stated in HPI     Physical Examination   Vital Signs       24 Hour VS Range     No intake or output data in the 24 hours ending 05/15/25 2136      Physical Exam    Data     Recent Labs   Lab 05/09/25  0702   WBC 4.59   HGB 15.9   HCT 48.7         Recent Labs   Lab 05/09/25  0702   PROTIME 11.7   INR 1.1      Recent Labs   Lab 05/09/25  0702      K 4.6      CO2 27   BUN 16   CREATININE 0.8   ANIONGAP 7*   CALCIUM 8.5*        JOSEF 3/2025    JOSEF performed prior to DCCV; there is no evidence of intracardiac thrombus.    Left Ventricle: The left ventricle is normal in size. Global hypokinesis present. There is severely reduced systolic function with a visually estimated ejection fraction of 15 - 20% during supraventricular tachycardia to 150bpm. After cardioversion to normal sinus, the EF appears improved to 25-30%.    Right Ventricle: The right ventricle is normal in size. Systolic function is mildly reduced.    Left Atrium: dilated The left atrial appendage has a windsock morphology. Appendage velocity is normal at greater than 40 cm/sec. Spontaneous filling of echo contrast. There is no thrombus in the left atrial appendage.    Mitral Valve: There is mild to moderate regurgitation with a centrally directed jet.    Tricuspid Valve: There is mild regurgitation.    Aorta: Aortic root is normal in size measuring 3.4 cm. Aortic root at ST junction is normal.    Pericardium: There is no pericardial effusion.    TTE 3/2025    Left Ventricle: The left ventricle is mildly dilated. Ventricular mass is normal. Normal wall thickness. Severe global hypokinesis and regional wall motion abnormalities present. There is severely reduced systolic function with a visually estimated ejection fraction of 15 - 20%. Quantitated ejection fraction is 17%. There is diastolic dysfunction.    Right Ventricle: The right ventricle has mild  enlargement. Systolic function is mildly reduced.    Left Atrium: Mildly dilated    Right Atrium: Right atrium is mildly dilated.    Mitral Valve: There is moderate regurgitation.    Tricuspid Valve: There is mild to moderate regurgitation.    Pulmonary Artery: The estimated pulmonary artery systolic pressure is 27 mmHg.    IVC/SVC: Elevated venous pressure at 15 mmHg.  Assessment & Plan     JOSEF for COSMO assessment prior to ablation  -No absolute contraindications of esophageal stricture, tumor, perforation, laceration,or diverticulum and/or active GI bleed  -The risks, benefits & alternatives of the procedure were explained to the patient.   -The risks of transesophageal echo include but are not limited to:  Dental trauma, esophageal trauma/perforation, bleeding, laryngospasm/brochospasm, aspiration, sore throat/hoarseness, & dislodgement of the endotracheal tube/nasogastric tube (where applicable).    -The risks of ANES monitored sedation include hypotension, respiratory depression, arrhythmias, bronchospasm, & death.    -Informed consent was obtained. The patient is agreeable to proceed with the procedure and all questions and concerns addressed.    Case discussed with an attending in echocardiography lab.    Fanny Hector MD  [facility]   Cardiovascular Disease PGY-V

## 2025-05-16 NOTE — ASSESSMENT & PLAN NOTE
Persistent AF with systolic heart failure/CMP s/p DCCV with recurrence of AF 2 days into amiodarone load. Here for PVI with PFA.     We discussed risks and benefits of PVI, at length. Our discussion included, but was not limited to the risk of death, infection, bleeding, stroke, MI, cardiac perforation, embolism, cardiac tamponade, vascular injury, valvular injury, AE fistula (RFA), injury to phrenic nerve (RFA), pulmonary vein stenosis (RFA), rhabdomyolysis (PFA), hemolysis (PFA) and other organic injury including the possibility for need for surgery or pacemaker implantation. He would elect for PVI with PFA. Will discuss timing with his work schedule.

## 2025-05-16 NOTE — DISCHARGE INSTRUCTIONS
"Medications:  Make sure to continue taking your blood thinner apixiban (trade name: Eliquis) after your procedure as you would normally take  Continue Amiodarone  Take pantoprazole (trade name: Protonix) nightly for at least 30 days after your procedure. This is to protect your esophagus during the post-operative period.  If given a prescription of furosemide (trade name: Lasix), which is a diuretic (fluid pill), you can take it daily or twice daily as needed for fluid retention or shortness of breath following your ablation  You may experience chest discomfort (also known as "pericarditis") with deep breathes, coughing, and/or laying down which is typically normal following your procedure. If this occurs, you can take ibuprofen (Motrin) 800 mg every 8 hours for 2-3 days. If the chest pain is persistent or severe please visit the nearest emergency department.    Groin site management, precautions, and restrictions:  Remove the bandages over your groin area the morning after your procedure. You can shower after you remove these bandages. Keep the groin sites clean and dry. You do not need to apply ointments or bandages to the area.   If oozing from groin site occurs, apply pressure without letting up for 15 minutes and lay flat for 1 hour. If bleeding has resolved, you can continue to monitor. If the bleeding continues or there is significant swelling or pain in the groin area, please visit the nearest ER for evaluation and treatment. DO NOT STOP TAKING YOUR BLOOD THINNER UNLESS INSTRUCTED BY A PHYSICIAN.   Do not take baths or submerge your groin area or at least 1 week or when the puncture sites in your groin have completely healed  Do not lift anything over 10 lbs for the first week after your procedure, and avoid strenuous activity during this time to allow for the groin sites to heal. After 1 week, there are no activity restrictions.  Please contact the electrophysiology clinic or go to the ER if you experience: " severe chest pain, shortness of breath, bleeding or swelling of the groin sites, or any other concerns.

## 2025-05-16 NOTE — Clinical Note
The ablation was performed in the left atrium at the pulmonary veins. 72 total pulsations delivered.

## 2025-05-16 NOTE — DISCHARGE SUMMARY
[facility] - Cardiology  Cardiac Electrophysiology  Discharge Summary      Patient Name: Danilo Krause  MRN: [id number]  Admission Date: 5/16/2025  Hospital Length of Stay: 0 days  Discharge Date and Time: 05/16/2025 2:47 PM  Attending Physician: Michael Appiah MD    Discharging Provider: Bhavana Bucio MD  Primary Care Physician: Sandy Gastelum MD    HPI:   Mr. Krause is a 45 year old with  Tachycardia-induced cardiomyopathy, paroxysmal atrial fib who presents today for PVI with PFA. First noted to be in an irregular rhythm by Dr. Appiah at a baseball game. [place] [facility] [place] JOSEF/DCCV. EF on JOSEF was 15-20% prior then roughly 30% post DCCV. On 3/14 Echocardiogram showed EF 15-20%. Mild MARILU. Moderate MRDennys Discharged home on GDMT. Placed on Amiodarone. Patient underwent repeat DCCV in 3/28.     [id number] 2  Anticoagulant/antiplatelets: eliquis, compliant  ECG: atrial [id number] y.o. male with atrial fibrillation, acute systolic HF/CMP.         Procedure(s) (LRB):  Ablation atrial fibrillation (N/A)  Transesophageal echo (JOSEF) intra-procedure log documentation (N/A)  Cardioversion or Defibrillation     Indwelling Lines/Drains at time of discharge:  Lines/Drains/Airways       Arterial Line  Duration             Arterial Line 05/16/25 1035 Left Radial <1 day                    Hospital Course:  Patient underwent successful EPS + PVI/PWI with PFA for treatment of atrial fibrillation. No evidence of intra- or post-procedure complications. Post-ablation ECG shows NSR, and no acute abnormalities.      EP medications at discharge:   Antiarrhythmics and/or AVN agents: unchanged. Continue Amiodarone during blanking period   Patient was instructed to continue their oral anticoagulation as previously prescribed   Patient was instructed to take ibuprofen 800 mg TID x 3 days for pericarditis.      Groin access sites without hematoma or bleeding. Activity restrictions given to patient. Instructed to seek  medical attention for shortness of breath, chest discomfort not alleviated with NSAIDs, bleeding/hematoma formation at the access sites, acute onset of neurologic symptoms, N/V, or hematemesis. At discharge the patient denied CP, SOB, access site bleeding/hematoma, or any other complaints or evidence of complications.         Goals of Care Treatment Preferences:  Code Status: Full Code      Consults:     Significant Diagnostic Studies: N/A    Pending Diagnostic Studies:       None            Final Active Diagnoses:    Diagnosis Date Noted POA    PRINCIPAL PROBLEM:  Persistent atrial fibrillation [I48.19] 03/14/2025 Yes      Problems Resolved During this Admission:     No new Assessment & Plan notes have been filed under this hospital service since the last note was generated.  Service: Arrhythmia      Discharged Condition: stable    Disposition: Home or Self Care    Follow Up:   Follow-up Information       Michael Appiah MD Follow up in 3 month(s).    Specialties: Electrophysiology, Cardiology  Contact information:  [id number] [facility] [address]  [phone]                           Patient Instructions:   No discharge procedures on file.  Medications:  Reconciled Home Medications:      Medication List        CONTINUE taking these medications      * amiodarone 200 MG Tab  Commonly known as: PACERONE  Take 2 tablets (400 mg total) by mouth 2 (two) times daily for 14 days, THEN 1 tablet (200 mg total) once daily.  Start taking on: March 15, 2025     * amiodarone 200 MG Tab  Commonly known as: PACERONE  Take 1 tablet (200 mg total) by mouth once daily.     apixaban 5 mg Tab  Commonly known as: ELIQUIS  Take 1 tablet (5 mg total) by mouth 2 (two) times daily.     cetirizine 10 MG tablet  Commonly known as: ZYRTEC  Take 1 tablet (10 mg total) by mouth once daily.     dapagliflozin propanediol 5 mg Tab tablet  Commonly known as: FARXIGA  Take 1 tablet (5 mg total) by mouth once daily.     ENTRESTO 24-26 mg  per tablet  Generic drug: sacubitriL-valsartan  Take 1 tablet by mouth 2 (two) times daily.     fluticasone propionate 50 mcg/actuation nasal spray  Commonly known as: FLONASE  1 spray (50 mcg total) by Each Nostril route once daily.     furosemide 20 MG tablet  Commonly known as: LASIX  Take 1 tablet (20 mg total) by mouth once daily. Take additional 20-mg tablet for weight gain over 2 lbs/day or 5 lbs/week or Edema.     metoprolol succinate 25 MG 24 hr tablet  Commonly known as: TOPROL-XL  Take 1 tablet (25 mg total) by mouth 2 (two) times daily.           * This list has 2 medication(s) that are the same as other medications prescribed for you. Read the directions carefully, and ask your doctor or other care provider to review them with you.                  Time spent on the discharge of patient: 45 minutes    Bhavana Bucio MD  Cardiac Electrophysiology  [facility] - Cardiology

## 2025-05-16 NOTE — ANESTHESIA PROCEDURE NOTES
Arterial    Diagnosis: a fib    Patient location during procedure: done in OR    Staffing  Authorizing Provider: Jean Paul King MD  Performing Provider: Jean Paul King MD    Staffing  Performed by: Jean Paul King MD  Authorized by: Jean Paul King MD    Anesthesiologist was present at the time of the procedure.  Arterial  Skin Prep: chlorhexidine gluconate  Local Infiltration: none  Orientation: left  Location: radial    Catheter Size: 20 G  Catheter placement by Ultrasound guidance. Heme positive aspiration all ports.   Vessel Caliber: patent  Needle advanced into vessel with real time Ultrasound guidance.Insertion Attempts: 2  Assessment  Dressing: secured with tape and tegaderm

## 2025-05-16 NOTE — HPI
Mr. Krause is a 45 year old with  Tachycardia-induced cardiomyopathy, paroxysmal atrial fib who presents today for PVI with PFA. First noted to be in an irregular rhythm by Dr. Appiah at a baseball game. Admitted to [facility] [place]. He underwent JOSEF/DCCV. EF on JOSEF was 15-20% prior then roughly 30% post DCCV. On 3/14 Echocardiogram showed EF 15-20%. Mild MARILU. Moderate MR. Discharged home on GDMT. Placed on Amiodarone. Patient underwent repeat DCCV in 3/28.     [id number] 2  Anticoagulant/antiplatelets: eliquis, compliant  ECG: atrial [id number] y.o. male with atrial fibrillation, acute systolic HF/CMP.

## 2025-05-16 NOTE — ANESTHESIA PROCEDURE NOTES
Intubation    Date/Time: 5/16/2025 10:28 AM    Performed by: Myra Gibson CRNA  Authorized by: Jean Paul King MD    Intubation:     Induction:  Intravenous    Intubated:  Postinduction    Mask Ventilation:  Easy with oral airway    Attempts:  1    Attempted By:  Staff anesthesiologist    Method of Intubation:  Video laryngoscopy    Blade:  Dyer 3    Laryngeal View Grade: Grade IIA - cords partially seen      Difficult Airway Encountered?: No      Complications:  None    Airway Device:  Oral endotracheal tube    Airway Device Size:  7.5    Style/Cuff Inflation:  Cuffed    Inflation Amount (mL):  8    Tube secured:  23    Secured at:  The lips    Placement Verified By:  Capnometry    Complicating Factors:  Short neck and poor neck/head extension    Findings Post-Intubation:  BS equal bilateral and atraumatic/condition of teeth unchanged

## 2025-05-16 NOTE — ANESTHESIA PREPROCEDURE EVALUATION
05/16/2025  Danilo Krause is a 45 y.o., male.      Pre-op Assessment    I have reviewed the Patient Summary Reports.       I have reviewed the Medications.     Review of Systems  Anesthesia Hx:  No problems with previous Anesthesia               Denies Personal Hx of Anesthesia complications.                    Cardiovascular:            CHF                Shortness of Breath       Congestive Heart Failure (CHF)                  Atrial Fibrillation     Pulmonary:      Shortness of breath                      Physical Exam    Airway:  No airway management difficulties anticipated  Dental:  No active dental issues noted  Chest/Lungs:  Clear to auscultation    Heart:  Rate: Normal  Rhythm: Regular Rhythm  Sounds: Normal        Anesthesia Plan  Type of Anesthesia, risks & benefits discussed:    Anesthesia Type: Gen ETT  Intra-op Monitoring Plan: Art Line  Informed Consent: Informed consent signed with the Patient and all parties understand the risks and agree with anesthesia plan.  All questions answered.   ASA Score: 3  Anesthesia Plan Notes: Chart reviewed. Patient seen and examined. Anesthesia plan discussed and questions answered. E-consent signed. Jean Paul King MD    Ready For Surgery From Anesthesia Perspective.     .

## 2025-05-16 NOTE — TRANSFER OF CARE
"Anesthesia Transfer of Care Note    Patient: Danilo Krause    Procedure(s) Performed: Procedure(s) (LRB):  Ablation atrial fibrillation (N/A)  Transesophageal echo (JOSEF) intra-procedure log documentation (N/A)  Cardioversion or Defibrillation    Patient location: Cath Lab    Anesthesia Type: general    Transport from OR: Transported from OR on 6-10 L/min O2 by face mask with adequate spontaneous ventilation    Post pain: adequate analgesia    Post assessment: no apparent anesthetic complications    Post vital signs: stable    Level of consciousness: awake    Nausea/Vomiting: no nausea/vomiting    Complications: none    Transfer of care protocol was followed      Last vitals: Visit Vitals  /71 (BP Location: Left arm, Patient Position: Lying)   Pulse 97   Temp 36.5 °C (97.7 °F) (Temporal)   Resp 18   Ht 5' 7" (1.702 m)   Wt 121.6 kg (268 lb)   SpO2 98%   BMI 41.97 kg/m²     "

## 2025-05-16 NOTE — NURSING
Pt brought to room 12 on SSCU post procedure to finish out recovery. Pt is AAOx4 and follows commands appropriately. Pt's vs wnl and pt is free from s/s of pain/distress. Pt's wife is at bedside and pt verbalized understanding of restrictions. Tele monitoring in place, call bell in reach, and bed is locked and in lowest position. Pt's nasreen groins have gauze/tegaderm in place and sites are free from s/s of bleeding. Safety measures in place.

## 2025-05-16 NOTE — ANESTHESIA POSTPROCEDURE EVALUATION
Anesthesia Post Evaluation    Patient: Danilo Krause    Procedure(s) Performed: Procedure(s) (LRB):  Ablation atrial fibrillation (N/A)  Transesophageal echo (JOSEF) intra-procedure log documentation (N/A)  Cardioversion or Defibrillation    Final Anesthesia Type: general      Patient participation: Yes- Able to Participate  Level of consciousness: awake and alert  Post-procedure vital signs: reviewed and stable  Pain control: Pain has been treated.  Airway patency: patent    PONV status: Absent or treated.  Anesthetic complications: no      Cardiovascular status: hemodynamically stable  Respiratory status: unassisted  Hydration status: euvolemic  Follow-up not needed.              Vitals Value Taken Time   /60 05/16/25 13:33   Temp 36.5 °C (97.7 °F) 05/16/25 12:45   Pulse 64 05/16/25 13:43   Resp 11 05/16/25 13:43   SpO2 99 % 05/16/25 13:43   Vitals shown include unfiled device data.      No case tracking events are documented in the log.      Pain/Zulma Score: Pain Rating Prior to Med Admin: 3 (5/16/2025  1:27 PM)  Zulma Score: 10 (5/16/2025  1:15 PM)

## 2025-06-11 DIAGNOSIS — I50.42 CHRONIC COMBINED SYSTOLIC AND DIASTOLIC HEART FAILURE: ICD-10-CM

## 2025-06-11 DIAGNOSIS — I50.42 CHRONIC COMBINED SYSTOLIC AND DIASTOLIC HEART FAILURE: Primary | ICD-10-CM

## 2025-06-11 DIAGNOSIS — I48.19 PERSISTENT ATRIAL FIBRILLATION: Primary | ICD-10-CM

## 2025-06-12 ENCOUNTER — TELEPHONE (OUTPATIENT)
Dept: ELECTROPHYSIOLOGY | Facility: CLINIC | Age: 45
End: 2025-06-12
Payer: COMMERCIAL

## 2025-07-07 ENCOUNTER — HOSPITAL ENCOUNTER (OUTPATIENT)
Dept: CARDIOLOGY | Facility: HOSPITAL | Age: 45
Discharge: HOME | End: 2025-07-07
Attending: INTERNAL MEDICINE
Payer: COMMERCIAL

## 2025-07-07 VITALS
HEIGHT: 67 IN | DIASTOLIC BLOOD PRESSURE: 69 MMHG | BODY MASS INDEX: 42.06 KG/M2 | HEART RATE: 70 BPM | WEIGHT: 268 LBS | SYSTOLIC BLOOD PRESSURE: 110 MMHG

## 2025-07-07 DIAGNOSIS — I50.42 CHRONIC COMBINED SYSTOLIC AND DIASTOLIC HEART FAILURE: ICD-10-CM

## 2025-07-07 DIAGNOSIS — I48.19 PERSISTENT ATRIAL FIBRILLATION: ICD-10-CM

## 2025-07-07 LAB
AORTIC SIZE INDEX (SOV): 1.8 CM/M2
AORTIC SIZE INDEX: 1.6 CM/M2
ASCENDING AORTA: 3.7 CM
AV AREA BY CONTINUOUS VTI: 3.1 CM2
AV INDEX (PROSTH): 0.84
AV LVOT MEAN GRADIENT: 2 MMHG
AV LVOT PEAK GRADIENT: 4 MMHG
AV MEAN GRADIENT: 3 MMHG
AV PEAK GRADIENT: 6 MMHG
AV VALVE AREA BY VELOCITY RATIO: 3.2 CM²
AV VALVE AREA: 3.2 CM2
AV VELOCITY RATIO: 0.83
BSA FOR ECHO PROCEDURE: 2.4 M2
CV ECHO LV RWT: 0.26 CM
DOP CALC AO PEAK VEL: 1.2 M/S
DOP CALC AO VTI: 27.8 CM
DOP CALC LVOT AREA: 3.8 CM2
DOP CALC LVOT DIAMETER: 2.2 CM
DOP CALC LVOT PEAK VEL: 1 M/S
DOP CALC LVOT STROKE VOLUME: 88.9 CM3
DOP CALCLVOT PEAK VEL VTI: 23.4 CM
E WAVE DECELERATION TIME: 202 MS
E/A RATIO: 3.67
E/E' RATIO: 9 M/S
ECHO EF ESTIMATED: 55 %
ECHO LV POSTERIOR WALL: 0.7 CM (ref 0.6–1.1)
EJECTION FRACTION: 55 %
FRACTIONAL SHORTENING: 30.2 % (ref 28–44)
INTERVENTRICULAR SEPTUM: 0.6 CM (ref 0.6–1.1)
IVC DIAMETER: 1.81 CM
IVRT: 107 MS
LA MAJOR: 5.6 CM
LA MINOR: 5.7 CM
LA WIDTH: 4.8 CM
LEFT ATRIUM SIZE: 3.8 CM
LEFT ATRIUM VOLUME INDEX MOD: 39 ML/M2
LEFT ATRIUM VOLUME INDEX: 38 ML/M2
LEFT ATRIUM VOLUME MOD: 89 ML
LEFT ATRIUM VOLUME: 88 CM3
LEFT INTERNAL DIMENSION IN SYSTOLE: 3.7 CM (ref 2.1–4)
LEFT VENTRICLE DIASTOLIC VOLUME INDEX: 58.08 ML/M2
LEFT VENTRICLE DIASTOLIC VOLUME: 133 ML
LEFT VENTRICLE MASS INDEX: 50.6 G/M2
LEFT VENTRICLE SYSTOLIC VOLUME INDEX: 25.8 ML/M2
LEFT VENTRICLE SYSTOLIC VOLUME: 59 ML
LEFT VENTRICULAR INTERNAL DIMENSION IN DIASTOLE: 5.3 CM (ref 3.5–6)
LEFT VENTRICULAR MASS: 115.9 G
LV LATERAL E/E' RATIO: 7.7
LV SEPTAL E/E' RATIO: 9.6
MV A" WAVE DURATION": 213.13 MS
MV PEAK A VEL: 0.21 M/S
MV PEAK E VEL: 0.77 M/S
OHS CV RV/LV RATIO: 0.89 CM
OHS LV EJECTION FRACTION SIMPSONS BIPLANE MOD: 53 %
PISA TR MAX VEL: 2.2 M/S
PULM VEIN A" WAVE DURATION": 213.13 MS
PULM VEIN S/D RATIO: 1.02
PULMONIC VEIN PEAK A VELOCITY: 0.2 M/S
PV PEAK D VEL: 0.45 M/S
PV PEAK S VEL: 0.46 M/S
RA MAJOR: 4.56 CM
RA PRESSURE ESTIMATED: 3 MMHG
RA WIDTH: 4.12 CM
RIGHT ATRIAL AREA: 16.3 CM2
RIGHT VENTRICLE DIASTOLIC BASEL DIMENSION: 4.7 CM
RV TB RVSP: 5 MMHG
RV TISSUE DOPPLER FREE WALL SYSTOLIC VELOCITY 1 (APICAL 4 CHAMBER VIEW): 11.65 CM/S
SINUS: 4.1 CM
STJ: 3.4 CM
TDI LATERAL: 0.1 M/S
TDI SEPTAL: 0.08 M/S
TDI: 0.09 M/S
TRICUSPID ANNULAR PLANE SYSTOLIC EXCURSION: 2.8 CM
TV PEAK GRADIENT: 19 MMHG
TV REST PULMONARY ARTERY PRESSURE: 22 MMHG
Z-SCORE OF LEFT VENTRICULAR DIMENSION IN END DIASTOLE: -5.01
Z-SCORE OF LEFT VENTRICULAR DIMENSION IN END SYSTOLE: -2.82

## 2025-07-07 PROCEDURE — 93306 TTE W/DOPPLER COMPLETE: CPT | Mod: 26,,, | Performed by: INTERNAL MEDICINE

## 2025-07-07 PROCEDURE — 93306 TTE W/DOPPLER COMPLETE: CPT

## 2025-07-30 ENCOUNTER — OFFICE VISIT (OUTPATIENT)
Dept: ELECTROPHYSIOLOGY | Facility: CLINIC | Age: 45
End: 2025-07-30
Payer: COMMERCIAL

## 2025-07-30 ENCOUNTER — HOSPITAL ENCOUNTER (OUTPATIENT)
Dept: CARDIOLOGY | Facility: CLINIC | Age: 45
Discharge: HOME | End: 2025-07-30
Payer: COMMERCIAL

## 2025-07-30 VITALS
SYSTOLIC BLOOD PRESSURE: 114 MMHG | DIASTOLIC BLOOD PRESSURE: 70 MMHG | HEIGHT: 67 IN | BODY MASS INDEX: 43.88 KG/M2 | WEIGHT: 279.56 LBS | HEART RATE: 55 BPM

## 2025-07-30 DIAGNOSIS — I50.42 CHRONIC COMBINED SYSTOLIC AND DIASTOLIC HEART FAILURE: ICD-10-CM

## 2025-07-30 DIAGNOSIS — I48.19 PERSISTENT ATRIAL FIBRILLATION: Primary | ICD-10-CM

## 2025-07-30 DIAGNOSIS — E66.01 SEVERE OBESITY (BMI >= 40): Chronic | ICD-10-CM

## 2025-07-30 DIAGNOSIS — I48.19 PERSISTENT ATRIAL FIBRILLATION: ICD-10-CM

## 2025-07-30 PROBLEM — I42.8 NICM (NONISCHEMIC CARDIOMYOPATHY): Status: RESOLVED | Noted: 2025-04-01 | Resolved: 2025-07-30

## 2025-07-30 LAB
OHS QRS DURATION: 84 MS
OHS QTC CALCULATION: 432 MS

## 2025-07-30 PROCEDURE — 3008F BODY MASS INDEX DOCD: CPT | Mod: CPTII,S$GLB,, | Performed by: INTERNAL MEDICINE

## 2025-07-30 PROCEDURE — 99214 OFFICE O/P EST MOD 30 MIN: CPT | Mod: S$GLB,,, | Performed by: INTERNAL MEDICINE

## 2025-07-30 PROCEDURE — 3078F DIAST BP <80 MM HG: CPT | Mod: CPTII,S$GLB,, | Performed by: INTERNAL MEDICINE

## 2025-07-30 PROCEDURE — 3074F SYST BP LT 130 MM HG: CPT | Mod: CPTII,S$GLB,, | Performed by: INTERNAL MEDICINE

## 2025-07-30 PROCEDURE — 3044F HG A1C LEVEL LT 7.0%: CPT | Mod: CPTII,S$GLB,, | Performed by: INTERNAL MEDICINE

## 2025-07-30 PROCEDURE — 1160F RVW MEDS BY RX/DR IN RCRD: CPT | Mod: CPTII,S$GLB,, | Performed by: INTERNAL MEDICINE

## 2025-07-30 PROCEDURE — 93010 ELECTROCARDIOGRAM REPORT: CPT | Mod: S$GLB,,, | Performed by: STUDENT IN AN ORGANIZED HEALTH CARE EDUCATION/TRAINING PROGRAM

## 2025-07-30 PROCEDURE — 4010F ACE/ARB THERAPY RXD/TAKEN: CPT | Mod: CPTII,S$GLB,, | Performed by: INTERNAL MEDICINE

## 2025-07-30 PROCEDURE — 99999 PR PBB SHADOW E&M-EST. PATIENT-LVL III: CPT | Mod: PBBFAC,,, | Performed by: INTERNAL MEDICINE

## 2025-07-30 PROCEDURE — 1159F MED LIST DOCD IN RCRD: CPT | Mod: CPTII,S$GLB,, | Performed by: INTERNAL MEDICINE

## 2025-07-30 NOTE — PROGRESS NOTES
Subjective   Patient ID:  Danilo Krause is a 45 y.o. male who presents for evaluation of Atrial Fibrillation    Primary Care Physician: Sandy Gastelum MD    HPI  Prior Hx:  I had the pleasure of seeing Mr Krause today in our electrophysiology clinic in consultation for his atrial arrhythmia and shortness of breath. As you are aware he is a pleasant 45 year-old man with no significant prior cardiac history. Runs a lawn/gardening business. Very physically active without limitations until ~1 month ago when he began to note progressive dyspnea on exertion, cough, fatigue and then orthopnea. Previously walked 3 miles a day and now limited to 1 mile. Went to urgent care on 3/2/2025. Noted to have bilateral middle ear effusions and pharyngeal erythema. Treated for bronchitis with steroid injection, anti-histamines and albuterol inhaler. Pulse rate at that visit was 101 bpm. Symptoms did not improve. He went back to an urgent care on 3/8/2025. Had a CXR suggestive of bilateral pulmonary edema. Pulse rate was 91 bpm. Given further sinus/bronchitis medications. Saw Dr. Gastelum on 3/11/2025 via telemedicine. There was concern for new onset heart failure. CT chest ordered and he was referred to Dr. Ortiz in cardiology (appointment scheduled Tuesday 3/18/2025). CT chest noted bilateral pleural effusions. He was rx augmentin as well. Notes he has felt somewhat better since starting augmentin. I ran into Mr Krause and his parents yesterday at the [employer] softball game and was asked to assess/speak with him. On my evaluation his pulse was irregular consistent with atrial fibrillation, rate was ~120-130 bpm. Kardia mobile ECG tracing was also consistent with atrial fibrillation. Discussed going to ER for more urgent evaluation (suspect persistent AF with development of rhythm related heart failure) however was feeling better and elected for urgent clinic visit this morning. Of note, his father and paternal uncle both have had  atrial fibrillation with AF related heart failure. Both ablated by Dr. Perez. Recent labs reviewed (CBC, CMP, TSH) and all were normal.    3/2025-5/2025: Underwent JOSEF/DCCV with LVEF noted to be 25-30%. Started on amiodarone. AF returned several days later. Underwent repeat DCCV after 2 week amiodarone load. Had early return to AF. Seen by Dr. Ortiz. Started on GDMT. Had abnormal nuclear study but no CAD on angiogram.     5/16/2025: PVI/LA posterior wall isolation with PFA.    Interim Hx:  Mr Krause returns for follow-up. No AF since PVI. ECHO 7/7/2025 noted LV functional recovery (EF 55%). Mild aortic root dilation at 4.1cm. Feels great.    My interpretation of today's in-clinic ECG is sinus rhythm with normal intervals.    Review of Systems   Constitutional: Negative for fever and malaise/fatigue.   HENT:  Negative for congestion and sore throat.    Eyes:  Negative for blurred vision and visual disturbance.   Cardiovascular:  Negative for chest pain, dyspnea on exertion, irregular heartbeat, near-syncope, palpitations and syncope.   Respiratory:  Negative for cough and shortness of breath.    Hematologic/Lymphatic: Negative for bleeding problem. Does not bruise/bleed easily.   Skin: Negative.    Musculoskeletal: Negative.    Gastrointestinal:  Negative for bloating, abdominal pain, hematochezia and melena.   Neurological:  Negative for focal weakness and weakness.   Psychiatric/Behavioral: Negative.            Objective     Physical Exam  Vitals reviewed.   Constitutional:       General: He is not in acute distress.     Appearance: He is well-developed. He is not diaphoretic.   HENT:      Head: Normocephalic and atraumatic.   Eyes:      General:         Right eye: No discharge.         Left eye: No discharge.      Conjunctiva/sclera: Conjunctivae normal.   Cardiovascular:      Rate and Rhythm: Normal rate and regular rhythm.      Heart sounds: No murmur heard.     No friction rub. No gallop.   Pulmonary:       Effort: Pulmonary effort is normal. No respiratory distress.      Breath sounds: Normal breath sounds. No wheezing or rales.   Abdominal:      General: Bowel sounds are normal. There is no distension.      Palpations: Abdomen is soft.      Tenderness: There is no abdominal tenderness.   Musculoskeletal:      Cervical back: Neck supple.   Skin:     General: Skin is warm and dry.   Neurological:      Mental Status: He is alert and oriented to person, place, and time.   Psychiatric:         Behavior: Behavior normal.         Thought Content: Thought content normal.         Judgment: Judgment normal.            Assessment and Plan     1. Persistent atrial fibrillation    2. 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 Severe obesity (BMI >= 40)        Plan:  In summary, Mr Krause is a pleasant 45 year-old man with persistent AF with AF induced systolic heart failure (EF 25-30%) who failed AAD therapy but is now s/p PVI/LA posterior wall isolation utilizing PFA with subsequent recovery of LV function with sinus rhythm. He is doing well. Would stop amiodarone at this point but continue GDMT as per Dr. Ortiz. Will see back in 3 months. If no AF then would consider stopping eliquis as he is now essentially [id number] 0. Likely will stop entresto/farxiga in 3 months as well then repeat ECHO 6 months later.    Visit today included increased complexity associated with the care of the episodic problem(s) addressed above in addition to managing the longitudinal care of the patient due to the serious and/or complex managed problem(s) listed above.      Thank you for allowing me to participate in the care of this patient. Please do not hesitate to call me with any questions or concerns.    Michael Appiah MD, PhD  Cardiac Electrophysiology               Cartilage Graft Text: The defect edges were debeveled with a #15 scalpel blade.  Given the location of the defect, shape of the defect, the fact the defect involved a full thickness cartilage defect a cartilage graft was deemed most appropriate.  An appropriate donor site was identified, cleansed, and anesthetized. The cartilage graft was then harvested and transferred to the recipient site, oriented appropriately and then sutured into place.  The secondary defect was then repaired using a primary closure.

## 2025-09-05 ENCOUNTER — OFFICE VISIT (OUTPATIENT)
Dept: PULMONOLOGY | Facility: CLINIC | Age: 45
End: 2025-09-05
Payer: COMMERCIAL

## 2025-09-05 VITALS
DIASTOLIC BLOOD PRESSURE: 82 MMHG | SYSTOLIC BLOOD PRESSURE: 117 MMHG | HEART RATE: 76 BPM | BODY MASS INDEX: 44.65 KG/M2 | HEIGHT: 67 IN | OXYGEN SATURATION: 98 % | WEIGHT: 284.5 LBS

## 2025-09-05 DIAGNOSIS — G47.33 OSA (OBSTRUCTIVE SLEEP APNEA): Primary | ICD-10-CM

## 2025-09-05 DIAGNOSIS — R06.09 DYSPNEA ON EXERTION: ICD-10-CM

## 2025-09-05 DIAGNOSIS — E66.813 CLASS 3 SEVERE OBESITY WITH BODY MASS INDEX (BMI) OF 40.0 TO 44.9 IN ADULT, UNSPECIFIED OBESITY TYPE, UNSPECIFIED WHETHER SERIOUS COMORBIDITY PRESENT: ICD-10-CM

## 2025-09-05 PROBLEM — E66.9 OBESITY, UNSPECIFIED: Status: ACTIVE | Noted: 2025-09-05

## 2025-09-05 PROCEDURE — 99999 PR PBB SHADOW E&M-EST. PATIENT-LVL IV: CPT | Mod: PBBFAC,,, | Performed by: INTERNAL MEDICINE

## (undated) DEVICE — R CATH SUPRM QPLR CRD-2 6F 120

## (undated) DEVICE — OMNIPAQUE CONTRAST 350MG/100ML

## (undated) DEVICE — NDL NRG TRNSPTAL 71CM

## (undated) DEVICE — KIT ENSITE ELECTRODE SURFACE

## (undated) DEVICE — PACK CATH LAB

## (undated) DEVICE — CATH EMPULSE ANGLED 5FR PIGTAI

## (undated) DEVICE — PACK EP DRAPE OMC

## (undated) DEVICE — DILATOR COONS TAPER 14FR

## (undated) DEVICE — KIT GLIDESHEATH SLEND 6FR 10CM

## (undated) DEVICE — BAND TR WITH INFLATOR

## (undated) DEVICE — INTRODUCER HEMOSTASIS 7.5F

## (undated) DEVICE — PAD DEFIB CADENCE ADULT R2

## (undated) DEVICE — ANGIOTOUCH KIT

## (undated) DEVICE — GUIDEWIRE ROSEN VAS JTIP 180CM

## (undated) DEVICE — WIRE GUIDE SAFE-T-J .035 260CM

## (undated) DEVICE — R CATH BIDIRECTIONL DF CRV 7FR

## (undated) DEVICE — INTRO FAST-CATH SL1 8.5FR 63CM

## (undated) DEVICE — ELECTRODE REM PLYHSV RETURN 9

## (undated) DEVICE — SHEATH STEERABLE CLEAR

## (undated) DEVICE — DILATOR VES COONS .038X16X20CM

## (undated) DEVICE — SHEATH INTRODUCER 9FR 11CM

## (undated) DEVICE — CATH ABLATION PULS FIELD 31MM

## (undated) DEVICE — CATH MAP BI-D HD SENSOR ENABLE

## (undated) DEVICE — R CATH ACUSON ACUNAV 8FR

## (undated) DEVICE — GUIDEWIRE AMPLATZ XSTIFF 180CM

## (undated) DEVICE — CATH OPTITORQUE TIGER 5F 100CM

## (undated) DEVICE — COVER PRB TRNSDUC 7.6X183CM

## (undated) DEVICE — LINE PRESSURE MONITORING 96IN

## (undated) DEVICE — CATH RESPONSE QPLR JSN 6F 120

## (undated) DEVICE — KIT SYR REUSABLE

## (undated) DEVICE — KIT PROBE COVER WITH GEL

## (undated) DEVICE — SHEATH HEMOSTASIS 8.5FR

## (undated) DEVICE — KIT MANIFOLD LOW PRESS TUBING